# Patient Record
Sex: FEMALE | Race: BLACK OR AFRICAN AMERICAN | Employment: OTHER | URBAN - METROPOLITAN AREA
[De-identification: names, ages, dates, MRNs, and addresses within clinical notes are randomized per-mention and may not be internally consistent; named-entity substitution may affect disease eponyms.]

---

## 2018-08-03 ENCOUNTER — APPOINTMENT (OUTPATIENT)
Dept: GENERAL RADIOLOGY | Age: 78
DRG: 558 | End: 2018-08-03
Attending: PHYSICIAN ASSISTANT
Payer: MEDICARE

## 2018-08-03 ENCOUNTER — HOSPITAL ENCOUNTER (INPATIENT)
Age: 78
LOS: 9 days | Discharge: NURSING, REHAB OR LONG-TERM CARE | DRG: 558 | End: 2018-08-13
Attending: EMERGENCY MEDICINE | Admitting: INTERNAL MEDICINE
Payer: MEDICARE

## 2018-08-03 DIAGNOSIS — R53.1 WEAKNESS: Primary | ICD-10-CM

## 2018-08-03 DIAGNOSIS — L03.116 CELLULITIS OF LEFT LOWER EXTREMITY: ICD-10-CM

## 2018-08-03 DIAGNOSIS — E87.20 LACTIC ACID ACIDOSIS: ICD-10-CM

## 2018-08-03 DIAGNOSIS — M62.82 NON-TRAUMATIC RHABDOMYOLYSIS: ICD-10-CM

## 2018-08-03 PROCEDURE — 71045 X-RAY EXAM CHEST 1 VIEW: CPT

## 2018-08-03 PROCEDURE — 94761 N-INVAS EAR/PLS OXIMETRY MLT: CPT

## 2018-08-03 PROCEDURE — 99285 EMERGENCY DEPT VISIT HI MDM: CPT

## 2018-08-03 RX ORDER — BISMUTH SUBSALICYLATE 262 MG
1 TABLET,CHEWABLE ORAL DAILY
COMMUNITY

## 2018-08-03 RX ORDER — POLYETHYLENE GLYCOL 3350 17 G/17G
17 POWDER, FOR SOLUTION ORAL DAILY
COMMUNITY

## 2018-08-03 RX ORDER — ASPIRIN 81 MG/1
81 TABLET ORAL DAILY
COMMUNITY

## 2018-08-03 RX ORDER — GLIPIZIDE 10 MG/1
10 TABLET, FILM COATED, EXTENDED RELEASE ORAL DAILY
COMMUNITY

## 2018-08-03 RX ORDER — TELMISARTAN 40 MG/1
40 TABLET ORAL DAILY
Status: ON HOLD | COMMUNITY
End: 2018-08-07

## 2018-08-03 RX ORDER — OMEPRAZOLE 20 MG/1
20 CAPSULE, DELAYED RELEASE ORAL DAILY
COMMUNITY

## 2018-08-03 RX ORDER — HYDROCHLOROTHIAZIDE 25 MG/1
25 TABLET ORAL DAILY
COMMUNITY
End: 2018-08-13

## 2018-08-03 RX ORDER — TROSPIUM CHLORIDE 20 MG/1
60 TABLET, FILM COATED ORAL DAILY
Status: ON HOLD | COMMUNITY
End: 2018-08-07

## 2018-08-03 RX ORDER — GABAPENTIN 100 MG/1
100 CAPSULE ORAL 3 TIMES DAILY
COMMUNITY

## 2018-08-03 NOTE — IP AVS SNAPSHOT
[address] [address] 
[phone] Patient: Ina Dodge MRN: [id number] TOE:[id number] A check ephraim indicates which time of day the medication should be taken. My Medications START taking these medications Instructions Each Dose to Equal  
 Morning Noon Evening Bedtime  
 amLODIPine 5 mg tablet Commonly known as:  Nathaniel Ghosh Your last dose was: Your next dose is: Take 1 Tab by mouth daily. 5 mg  
    
   
   
   
  
 linezolid 600 mg tablet Commonly known as:  Erum Cardenas Your last dose was: Your next dose is: Take 1 Tab by mouth two (2) times a day for 4 days. 600 mg CONTINUE taking these medications Instructions Each Dose to Equal  
 Morning Noon Evening Bedtime  
 aspirin delayed-release 81 mg tablet Your last dose was: Your next dose is: Take 81 mg by mouth daily. 81 mg  
    
   
   
   
  
 gabapentin 100 mg capsule Commonly known as:  NEURONTIN Your last dose was: Your next dose is: Take 100 mg by mouth three (3) times daily. 100 mg GLUCOTROL XL 10 mg CR tablet Generic drug:  glipiZIDE SR Your last dose was: Your next dose is: Take 10 mg by mouth daily. 10 mg  
    
   
   
   
  
 JANUVIA 100 mg tablet Generic drug:  SITagliptin Your last dose was: Your next dose is: Take 100 mg by mouth daily. 100 mg  
    
   
   
   
  
 multivitamin tablet Commonly known as:  ONE A DAY Your last dose was: Your next dose is: Take 1 Tab by mouth daily. 1 Tab  
    
   
   
   
  
 omeprazole 20 mg capsule Commonly known as:  PRILOSEC Your last dose was: Your next dose is: Take 20 mg by mouth daily.   
 20 mg  
    
   
   
   
  
 polyethylene glycol 17 gram/dose powder Commonly known as:  Armando Gramajo Your last dose was: Your next dose is: Take 17 g by mouth daily. 17 g SUPER OMEGA-3 400-5 mg-unit Cap Generic drug:  Fish Oil-Vit E-Fat [id number]- Your last dose was: Your next dose is: Take 1 Tab by mouth daily. 1 Tab  
    
   
   
   
  
 trospium 60 mg capsule Commonly known as:  SANCTURA XL Your last dose was: Your next dose is: Take 60 mg by mouth Daily (before breakfast). 60 mg  
    
   
   
   
  
 ZOCOR 20 mg tablet Generic drug:  simvastatin Your last dose was: Your next dose is: Take 20 mg by mouth nightly. 20 mg  
    
   
   
   
  
  
STOP taking these medications   
 hydroCHLOROthiazide 25 mg tablet Commonly known as:  HYDRODIURIL HYDROcodone-acetaminophen 5-300 mg tablet Commonly known as:  XODOL  
   
  
 telmisartan 80 mg tablet Commonly known as:  Anne Moser Where to Get Your Medications These medications were sent to [address] [place] [address] Hours:  24-hours Phone:  [phone]  
  amLODIPine 5 mg tablet Information on where to get these meds will be given to you by the nurse or doctor. ! Ask your nurse or doctor about these medications  
  linezolid 600 mg tablet

## 2018-08-03 NOTE — IP AVS SNAPSHOT
[address] [address] 
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 
[phone] [facility])  SNF is responsible for making arrangements for the PCP visit while in-house. Chosen to continue managing your healthcare needs [phone] [facility] 
[phone] Discharge Orders None A check ephraim indicates which time of day the medication should be taken. My Medications START taking these medications Instructions Each Dose to Equal  
 Morning Noon Evening Bedtime  
 amLODIPine 5 mg tablet Commonly known as:  Aashish Kim Your last dose was: Your next dose is: Take 1 Tab by mouth daily. 5 mg  
    
   
   
   
  
 linezolid 600 mg tablet Commonly known as:  Beba Larsen Your last dose was: Your next dose is: Take 1 Tab by mouth two (2) times a day for 4 days. 600 mg CONTINUE taking these medications Instructions Each Dose to Equal  
 Morning Noon Evening Bedtime  
 aspirin delayed-release 81 mg tablet Your last dose was: Your next dose is: Take 81 mg by mouth daily.   
 81 mg  
    
   
   
   
  
 gabapentin 100 mg capsule Commonly known as:  NEURONTIN Your last dose was: Your next dose is: Take 100 mg by mouth three (3) times daily. 100 mg GLUCOTROL XL 10 mg CR tablet Generic drug:  glipiZIDE SR Your last dose was: Your next dose is: Take 10 mg by mouth daily. 10 mg  
    
   
   
   
  
 JANUVIA 100 mg tablet Generic drug:  SITagliptin Your last dose was: Your next dose is: Take 100 mg by mouth daily. 100 mg  
    
   
   
   
  
 multivitamin tablet Commonly known as:  ONE A DAY Your last dose was: Your next dose is: Take 1 Tab by mouth daily. 1 Tab  
    
   
   
   
  
 omeprazole 20 mg capsule Commonly known as:  PRILOSEC Your last dose was: Your next dose is: Take 20 mg by mouth daily. 20 mg  
    
   
   
   
  
 polyethylene glycol 17 gram/dose powder Commonly known as:  Scot Olga Your last dose was: Your next dose is: Take 17 g by mouth daily. 17 g SUPER OMEGA-3 400-5 mg-unit Cap Generic drug:  Fish Oil-Vit E-Fat [id number]- Your last dose was: Your next dose is: Take 1 Tab by mouth daily. 1 Tab  
    
   
   
   
  
 trospium 60 mg capsule Commonly known as:  SANCTURA XL Your last dose was: Your next dose is: Take 60 mg by mouth Daily (before breakfast). 60 mg  
    
   
   
   
  
 ZOCOR 20 mg tablet Generic drug:  simvastatin Your last dose was: Your next dose is: Take 20 mg by mouth nightly. 20 mg  
    
   
   
   
  
  
STOP taking these medications   
 hydroCHLOROthiazide 25 mg tablet Commonly known as:  HYDRODIURIL HYDROcodone-acetaminophen 5-300 mg tablet Commonly known as:  XODOL  
   
  
 telmisartan 80 mg tablet Commonly known as:  Agustina Shear  
 Where to Get Your Medications These medications were sent to [address]  904 [facility], [address] Naya Goff [address] [facility] Hours:  24-hours Phone:  [phone]  
  amLODIPine 5 mg tablet Information on where to get these meds will be given to you by the nurse or doctor. ! Ask your nurse or doctor about these medications  
  linezolid 600 mg tablet Discharge Instructions None [place] Announcement We are excited to announce that we are making your provider's discharge notes available to you in "[employer]". You will see these notes when they are completed and signed by the physician that discharged you from your recent hospital stay. If you have any questions or concerns about any information you see in "[employer]", please call the Health Information Department where you were seen or reach out to your Primary Care Provider for more information about your plan of care. Introducing [facility]! [facility] introduces "[employer]" patient portal. Now you can access parts of your medical record, email your doctor's office, and request medication refills online. 1. In your internet browser, go to [email or web address] 2. Click on the First Time User? Click Here link in the Sign In box. You will see the New Member Sign Up page. 3. Enter your "[employer]" Access Code exactly as it appears below. You will not need to use this code after youve completed the sign-up process. If you do not sign up before the expiration date, you must request a new code. · "[employer]" Access Code: [email or web address] Expires: 11/1/2018 11:31 PM 
 
4. Enter the last four digits of your Social Security Number (xxxx) and Date of Birth (mm/dd/yyyy) as indicated and click Submit. You will be taken to the next sign-up page. 5. Create a "[employer]" ID.  This will be your "[employer]" login ID and cannot be changed, so think of one that is secure and easy to remember. 6. Create a [employer] password. You can change your password at any time. 7. Enter your Password Reset Question and Answer. This can be used at a later time if you forget your password. 8. Enter your e-mail address. You will receive e-mail notification when new information is available in [address] 9. Click Sign Up. You can now view and download portions of your medical record. 10. Click the Download Summary menu link to download a portable copy of your medical information. If you have questions, please visit the Frequently Asked Questions section of the [employer] website. Remember, [employer] is NOT to be used for urgent needs. For medical emergencies, dial 911. Now available from your iPhone and Android! Introducing Josiah Nixon As a [employer] patient, I wanted to make you aware of our electronic visit tool called Josiah Nixon. [employer] 24/7 allows you to connect within minutes with a medical provider 24 hours a day, seven days a week via a mobile device or tablet or logging into a secure website from your computer. You can access Josiah Nixon from anywhere in the [place]. A virtual visit might be right for you when you have a simple condition and feel like you just dont want to get out of bed, or cant get away from work for an appointment, when your regular [employer] provider is not available (evenings, weekends or holidays), or when youre out of town and need minor care. Electronic visits cost only $49 and if the [employer] 24/7 provider determines a prescription is needed to treat your condition, one can be electronically transmitted to a nearby pharmacy*. Please take a moment to enroll today if you have not already done so. The enrollment process is free and takes just a few minutes.   To enroll, please download the [employer] 24/7 steven to your tablet or phone, or visit [email or web address] to enroll on your computer. And, as an [address] [place] [address] patient with a [employer] account, the results of your visits will be scanned into your electronic medical record and your primary care provider will be able to view the scanned results. We urge you to continue to see your regular [facility] provider for your ongoing medical care. And while your primary care provider may not be the one available when you seek a [employer] virtual visit, the peace of mind you get from getting a real diagnosis real time can be priceless. For more information on [employer], view our Frequently Asked Questions (FAQs) at [email or web address]. Sincerely, 
 
Masoud Solano MD 
Chief Medical Officer [address] *:  certain medications cannot be prescribed via [employer] Unresulted Labs-Please follow up with your PCP about these lab tests Order Current Status CULTURE, BLOOD Preliminary result EKG, 12 LEAD, SUBSEQUENT Preliminary result Providers Seen During Your Hospitalization Provider Specialty Primary office phone Carmela Jean MD Emergency Medicine [phone] Sima Valentin MD Internal Medicine [phone] Your Primary Care Physician (PCP) Primary Care Physician Office Phone Office Fax Rowdy Euceda [phone] You are allergic to the following Allergen Reactions Codeine Rash Itching Percocet (Oxycodone-Acetaminophen) Nausea and Vomiting Tramadol Rash Itching Recent Documentation Height Weight BMI Smoking Status 1.702 m 106.9 kg 36.9 kg/m2 Never Assessed Emergency Contacts Name Discharge Info Relation Home Work Mobile [facility] OUTPATIENT CLINIC DISCHARGE CAREGIVER [3] Daughter [21]   [phone] Rajni Austin DISCHARGE CAREGIVER [3] Daughter [21]   [phone] Patient Belongings The following personal items are in your possession at time of discharge: 
  Dental Appliances: Uppers, Lowers, Partials  Visual Aid: None      Home Medications: None   Jewelry: Watch, Other (comment)  Clothing: At bedside    Other Valuables: Cell Phone Discharge Instructions Attachments/References RHABDOMYOLYSIS (ENGLISH) WOUND: VAC (VACUUM-ASSISTED CLOSURE) (ENGLISH) Patient Handouts Rhabdomyolysis: Care Instructions Your Care Instructions When you have rhabdomyolysis (say \"wre-tyo-uy-AH-dago-spencers\"), dying muscle cells cause toxins to build up in the blood. If not treated, it can cause life-threatening damage to the body's organs. It can be caused by many things, such as severe muscle injury, some medicines (like statins), the flu, and certain blood infections. Symptoms may include weak muscles, pain, stiffness, fever, and nausea. Your urine may also be dark. You will get treatment in the hospital. If possible, the doctor will stop the cause of muscle cell death. The doctor will take steps to protect your organs. You may have to stop taking certain medicines if they are the cause of the problem. You will also get treatment to help the kidneys remove the toxins from your blood. This includes plenty of fluids. You may get fluids through a vein (by IV). You may also need dialysis. Follow-up care is a key part of your treatment and safety. Be sure to make and go to all appointments, and call your doctor if you are having problems. It's also a good idea to know your test results and keep a list of the medicines you take. How can you care for yourself at home? · Take pain medicines exactly as directed. ¨ If the doctor gave you a prescription medicine for pain, take it as prescribed. ¨ If you are not taking a prescription pain medicine, ask your doctor if you can take an over-the-counter medicine. · Talk to your doctor about whether you need to stop taking any medicines. Follow your doctor's instructions about stopping medicines. · Drink plenty of fluids, enough so that your urine is light yellow or clear like water. If you have kidney, heart, or liver disease and have to limit fluids, talk with your doctor before you increase the amount of fluids you drink. When should you call for help? Call your doctor now or seek immediate medical care if: 
  · You have new or worse muscle pain.  
  · You have less urine than normal or no urine.  
  · You have new swelling in your arms or feet.  
  · You have blood in your urine.  
 Watch closely for changes in your health, and be sure to contact your doctor if you do not get better as expected. Where can you learn more? Go to http://mali-tatyana.info/. Enter F129 in the search box to learn more about \"Rhabdomyolysis: Care Instructions. \" Current as of: May 12, 2017 Content Version: 11.7 © 7480-3782 [employer]. Care instructions adapted under license by [employer] (which disclaims liability or warranty for this information). If you have questions about a medical condition or this instruction, always ask your healthcare professional. [address] [address] any warranty or liability for your use of this information. Vacuum-Assisted Closure for Wound Healing: Care Instructions Your Care Instructions When you have a wound that is hard to close your doctor may treat it with vacuum-assisted closure (VAC). VAC uses negative pressure (suction) to help bring the edges of your wound together. It also removes fluid and dead tissue from the wound area. In VAC: 
· A special piece of foam or cotton gauze fits over your wound. This covers and protects the wound. A clear bandage (film dressing) goes several inches beyond the foam or gauze dressing to create a seal for the vacuum. · A tube connects the foam to a small machine called the therapy unit.  The therapy unit creates the suction. · The [facility] system may be carried around (portable) or may stay in one place (stationary). VAC does not hurt. You may feel a mild pulling on the wound when treatment first starts. How long you need VAC depends on the size and type of wound you have and how well the [facility] works. You will be limited in what you can do while the wound heals. You will use VAC 24 hours a day. Follow-up care is a key part of your treatment and safety. Be sure to make and go to all appointments, and call your doctor if you are having problems. It's also a good idea to know your test results and keep a list of the medicines you take. How can you care for yourself at home? · A home health care worker will come to your home a few times a week to change the bandage and check the machine. You may need it changed more often if there is a lot of drainage. · Your doctor will give you information on what you can and can't do. This depends on where your wound is located. Your activities may be limited during the time you're using VAC. · You will be able to take sponge baths. Don't shower or take baths unless your doctor says it is okay. · Take pain medicines exactly as directed. ¨ If the doctor gave you a prescription medicine for pain, take it as prescribed. ¨ If you are not taking a prescription pain medicine, ask your doctor if you can take an over-the-counter medicine. · If your doctor prescribed antibiotics, take them as directed. Do not stop taking them just because you feel better. You need to take the full course of antibiotics. When should you call for help? Call 911 anytime you think you may need emergency care. For example, call if: 
  · You have a lot of bleeding or see a sudden change in the color or texture of the drainage.  
  · The wound splits open and organs under the skin can be seen (evisceration).  
 Call your doctor now or seek immediate medical care if:   · The wound starts bleeding.  
  · The bandage comes off. Cover the area with a sterile bandage until you can see your doctor or your home health care worker comes by.  
  · You have signs of infection, such as: 
¨ Increased pain, swelling, warmth, or redness around the wound. ¨ Red streaks leading from the wound. ¨ Pus draining from the wound. ¨ A fever.  
 Watch closely for changes in your health, and be sure to contact your doctor if: 
  · The noise the machine makes changes or gets very loud. This may mean the seal is broken or the machine is not producing enough suction. Where can you learn more? Go to http://mali-tatyana.info/. Enter D706 in the search box to learn more about \"Vacuum-Assisted Closure for Wound Healing: Care Instructions. \" Current as of: November 21, 2017 Content Version: 11.7 © 8835-6903 [employer], [employer]. Care instructions adapted under license by [employer] (which disclaims liability or warranty for this information). If you have questions about a medical condition or this instruction, always ask your healthcare professional. [address] any warranty or liability for your use of this information. Please provide this summary of care documentation to your next provider. Signatures-by signing, you are acknowledging that this After Visit Summary has been reviewed with you and you have received a copy. Patient Signature:  ____________________________________________________________ Date:  ____________________________________________________________  
  
Xeniasph Perfect Provider Signature:  ____________________________________________________________ Date:  ____________________________________________________________

## 2018-08-04 ENCOUNTER — APPOINTMENT (OUTPATIENT)
Dept: VASCULAR SURGERY | Age: 78
DRG: 558 | End: 2018-08-04
Attending: PHYSICIAN ASSISTANT
Payer: MEDICARE

## 2018-08-04 PROBLEM — E87.20 LACTIC ACIDOSIS: Status: ACTIVE | Noted: 2018-08-04

## 2018-08-04 PROBLEM — M62.82 RHABDOMYOLYSIS: Status: ACTIVE | Noted: 2018-08-04

## 2018-08-04 PROBLEM — L97.909 LEG ULCER (HCC): Status: ACTIVE | Noted: 2018-08-04

## 2018-08-04 LAB
ALBUMIN SERPL-MCNC: 2.8 G/DL (ref 3.4–5)
ALBUMIN/GLOB SERPL: 0.5 {RATIO} (ref 0.8–1.7)
ALP SERPL-CCNC: 99 U/L (ref 45–117)
ALT SERPL-CCNC: 38 U/L (ref 13–56)
ANION GAP SERPL CALC-SCNC: 6 MMOL/L (ref 3–18)
AST SERPL-CCNC: 78 U/L (ref 15–37)
ATRIAL RATE: 111 BPM
BASOPHILS # BLD: 0 K/UL (ref 0–0.1)
BASOPHILS NFR BLD: 0 % (ref 0–2)
BILIRUB SERPL-MCNC: 0.4 MG/DL (ref 0.2–1)
BUN SERPL-MCNC: 19 MG/DL (ref 7–18)
BUN/CREAT SERPL: 21 (ref 12–20)
CALCIUM SERPL-MCNC: 9.7 MG/DL (ref 8.5–10.1)
CALCULATED P AXIS, ECG09: 48 DEGREES
CALCULATED R AXIS, ECG10: -36 DEGREES
CALCULATED T AXIS, ECG11: 50 DEGREES
CHLORIDE SERPL-SCNC: 102 MMOL/L (ref 100–108)
CK MB CFR SERPL CALC: 0.3 % (ref 0–4)
CK MB SERPL-MCNC: 7.4 NG/ML (ref 5–25)
CK SERPL-CCNC: 1963 U/L (ref 26–192)
CK SERPL-CCNC: 2497 U/L (ref 26–192)
CO2 SERPL-SCNC: 28 MMOL/L (ref 21–32)
CREAT SERPL-MCNC: 0.92 MG/DL (ref 0.6–1.3)
DIAGNOSIS, 93000: NORMAL
DIFFERENTIAL METHOD BLD: ABNORMAL
EOSINOPHIL # BLD: 0 K/UL (ref 0–0.4)
EOSINOPHIL NFR BLD: 0 % (ref 0–5)
ERYTHROCYTE [DISTWIDTH] IN BLOOD BY AUTOMATED COUNT: 16.2 % (ref 11.6–14.5)
EST. AVERAGE GLUCOSE BLD GHB EST-MCNC: 140 MG/DL
GLOBULIN SER CALC-MCNC: 6.1 G/DL (ref 2–4)
GLUCOSE BLD STRIP.AUTO-MCNC: 153 MG/DL (ref 70–110)
GLUCOSE BLD STRIP.AUTO-MCNC: 169 MG/DL (ref 70–110)
GLUCOSE BLD STRIP.AUTO-MCNC: 172 MG/DL (ref 70–110)
GLUCOSE BLD STRIP.AUTO-MCNC: 175 MG/DL (ref 70–110)
GLUCOSE SERPL-MCNC: 180 MG/DL (ref 74–99)
HBA1C MFR BLD: 6.5 % (ref 4.5–5.6)
HCT VFR BLD AUTO: 37.1 % (ref 35–45)
HGB BLD-MCNC: 12.1 G/DL (ref 12–16)
LACTATE SERPL-SCNC: 2 MMOL/L (ref 0.4–2)
LACTATE SERPL-SCNC: 2.1 MMOL/L (ref 0.4–2)
LACTATE SERPL-SCNC: 2.3 MMOL/L (ref 0.4–2)
LACTATE SERPL-SCNC: 2.3 MMOL/L (ref 0.4–2)
LACTATE SERPL-SCNC: 2.4 MMOL/L (ref 0.4–2)
LYMPHOCYTES # BLD: 0.9 K/UL (ref 0.9–3.6)
LYMPHOCYTES NFR BLD: 7 % (ref 21–52)
MCH RBC QN AUTO: 27.1 PG (ref 24–34)
MCHC RBC AUTO-ENTMCNC: 32.6 G/DL (ref 31–37)
MCV RBC AUTO: 83.2 FL (ref 74–97)
MONOCYTES # BLD: 0.5 K/UL (ref 0.05–1.2)
MONOCYTES NFR BLD: 4 % (ref 3–10)
NEUTS SEG # BLD: 11.5 K/UL (ref 1.8–8)
NEUTS SEG NFR BLD: 89 % (ref 40–73)
P-R INTERVAL, ECG05: 160 MS
PLATELET # BLD AUTO: 226 K/UL (ref 135–420)
PMV BLD AUTO: 9.7 FL (ref 9.2–11.8)
POTASSIUM SERPL-SCNC: 3.5 MMOL/L (ref 3.5–5.5)
PROT SERPL-MCNC: 8.9 G/DL (ref 6.4–8.2)
Q-T INTERVAL, ECG07: 338 MS
QRS DURATION, ECG06: 76 MS
QTC CALCULATION (BEZET), ECG08: 459 MS
RBC # BLD AUTO: 4.46 M/UL (ref 4.2–5.3)
SODIUM SERPL-SCNC: 136 MMOL/L (ref 136–145)
TROPONIN I SERPL-MCNC: 0.03 NG/ML (ref 0–0.06)
VENTRICULAR RATE, ECG03: 111 BPM
WBC # BLD AUTO: 12.9 K/UL (ref 4.6–13.2)

## 2018-08-04 PROCEDURE — 96367 TX/PROPH/DG ADDL SEQ IV INF: CPT

## 2018-08-04 PROCEDURE — 85025 COMPLETE CBC W/AUTO DIFF WBC: CPT | Performed by: PHYSICIAN ASSISTANT

## 2018-08-04 PROCEDURE — 83605 ASSAY OF LACTIC ACID: CPT | Performed by: INTERNAL MEDICINE

## 2018-08-04 PROCEDURE — 93005 ELECTROCARDIOGRAM TRACING: CPT

## 2018-08-04 PROCEDURE — 96365 THER/PROPH/DIAG IV INF INIT: CPT

## 2018-08-04 PROCEDURE — 96375 TX/PRO/DX INJ NEW DRUG ADDON: CPT

## 2018-08-04 PROCEDURE — A4216 STERILE WATER/SALINE, 10 ML: HCPCS | Performed by: PHYSICIAN ASSISTANT

## 2018-08-04 PROCEDURE — 82550 ASSAY OF CK (CPK): CPT | Performed by: PHYSICIAN ASSISTANT

## 2018-08-04 PROCEDURE — 83605 ASSAY OF LACTIC ACID: CPT | Performed by: EMERGENCY MEDICINE

## 2018-08-04 PROCEDURE — 74011250637 HC RX REV CODE- 250/637: Performed by: PHYSICIAN ASSISTANT

## 2018-08-04 PROCEDURE — 65270000029 HC RM PRIVATE

## 2018-08-04 PROCEDURE — 74011250637 HC RX REV CODE- 250/637: Performed by: INTERNAL MEDICINE

## 2018-08-04 PROCEDURE — 83036 HEMOGLOBIN GLYCOSYLATED A1C: CPT | Performed by: INTERNAL MEDICINE

## 2018-08-04 PROCEDURE — 83605 ASSAY OF LACTIC ACID: CPT | Performed by: PHYSICIAN ASSISTANT

## 2018-08-04 PROCEDURE — 74011250636 HC RX REV CODE- 250/636: Performed by: INTERNAL MEDICINE

## 2018-08-04 PROCEDURE — 93971 EXTREMITY STUDY: CPT

## 2018-08-04 PROCEDURE — 87040 BLOOD CULTURE FOR BACTERIA: CPT | Performed by: PHYSICIAN ASSISTANT

## 2018-08-04 PROCEDURE — 82962 GLUCOSE BLOOD TEST: CPT

## 2018-08-04 PROCEDURE — 80053 COMPREHEN METABOLIC PANEL: CPT | Performed by: PHYSICIAN ASSISTANT

## 2018-08-04 PROCEDURE — 74011250636 HC RX REV CODE- 250/636: Performed by: EMERGENCY MEDICINE

## 2018-08-04 PROCEDURE — 74011250636 HC RX REV CODE- 250/636: Performed by: PHYSICIAN ASSISTANT

## 2018-08-04 PROCEDURE — 74011636637 HC RX REV CODE- 636/637: Performed by: INTERNAL MEDICINE

## 2018-08-04 PROCEDURE — 36415 COLL VENOUS BLD VENIPUNCTURE: CPT | Performed by: INTERNAL MEDICINE

## 2018-08-04 RX ORDER — TROSPIUM CHLORIDE 20 MG/1
60 TABLET, FILM COATED ORAL DAILY
Status: DISCONTINUED | OUTPATIENT
Start: 2018-08-04 | End: 2018-08-13 | Stop reason: HOSPADM

## 2018-08-04 RX ORDER — CIPROFLOXACIN 500 MG/1
500 TABLET ORAL
Status: DISCONTINUED | OUTPATIENT
Start: 2018-08-04 | End: 2018-08-04

## 2018-08-04 RX ORDER — SODIUM CHLORIDE 9 MG/ML
150 INJECTION, SOLUTION INTRAVENOUS CONTINUOUS
Status: DISCONTINUED | OUTPATIENT
Start: 2018-08-04 | End: 2018-08-07

## 2018-08-04 RX ORDER — ACETAMINOPHEN 650 MG/1
650 SUPPOSITORY RECTAL
Status: DISCONTINUED | OUTPATIENT
Start: 2018-08-04 | End: 2018-08-13 | Stop reason: HOSPADM

## 2018-08-04 RX ORDER — INSULIN LISPRO 100 [IU]/ML
INJECTION, SOLUTION INTRAVENOUS; SUBCUTANEOUS
Status: DISCONTINUED | OUTPATIENT
Start: 2018-08-04 | End: 2018-08-13 | Stop reason: HOSPADM

## 2018-08-04 RX ORDER — HYDROCODONE BITARTRATE AND ACETAMINOPHEN 10; 325 MG/1; MG/1
1 TABLET ORAL
Status: DISCONTINUED | OUTPATIENT
Start: 2018-08-04 | End: 2018-08-05

## 2018-08-04 RX ORDER — VANCOMYCIN/0.9 % SOD CHLORIDE 1.5G/250ML
1500 PLASTIC BAG, INJECTION (ML) INTRAVENOUS EVERY 24 HOURS
Status: DISCONTINUED | OUTPATIENT
Start: 2018-08-05 | End: 2018-08-08 | Stop reason: DRUGHIGH

## 2018-08-04 RX ORDER — TELMISARTAN 40 MG/1
40 TABLET ORAL DAILY
Status: DISCONTINUED | OUTPATIENT
Start: 2018-08-04 | End: 2018-08-10

## 2018-08-04 RX ORDER — HYDROCODONE BITARTRATE AND ACETAMINOPHEN 5; 325 MG/1; MG/1
1 TABLET ORAL
Status: COMPLETED | OUTPATIENT
Start: 2018-08-04 | End: 2018-08-04

## 2018-08-04 RX ORDER — POLYETHYLENE GLYCOL 3350 17 G/17G
17 POWDER, FOR SOLUTION ORAL DAILY
Status: DISCONTINUED | OUTPATIENT
Start: 2018-08-04 | End: 2018-08-13 | Stop reason: HOSPADM

## 2018-08-04 RX ORDER — DEXTROSE 50 % IN WATER (D50W) INTRAVENOUS SYRINGE
25-50 AS NEEDED
Status: DISCONTINUED | OUTPATIENT
Start: 2018-08-04 | End: 2018-08-13 | Stop reason: HOSPADM

## 2018-08-04 RX ORDER — CIPROFLOXACIN 2 MG/ML
400 INJECTION, SOLUTION INTRAVENOUS
Status: COMPLETED | OUTPATIENT
Start: 2018-08-04 | End: 2018-08-04

## 2018-08-04 RX ORDER — MAGNESIUM SULFATE 100 %
4 CRYSTALS MISCELLANEOUS AS NEEDED
Status: DISCONTINUED | OUTPATIENT
Start: 2018-08-04 | End: 2018-08-13 | Stop reason: HOSPADM

## 2018-08-04 RX ORDER — HEPARIN SODIUM 5000 [USP'U]/ML
5000 INJECTION, SOLUTION INTRAVENOUS; SUBCUTANEOUS EVERY 8 HOURS
Status: DISCONTINUED | OUTPATIENT
Start: 2018-08-04 | End: 2018-08-13 | Stop reason: HOSPADM

## 2018-08-04 RX ORDER — CEPHALEXIN 250 MG/1
500 CAPSULE ORAL
Status: DISCONTINUED | OUTPATIENT
Start: 2018-08-04 | End: 2018-08-04

## 2018-08-04 RX ORDER — GABAPENTIN 100 MG/1
100 CAPSULE ORAL 3 TIMES DAILY
Status: DISCONTINUED | OUTPATIENT
Start: 2018-08-04 | End: 2018-08-13 | Stop reason: HOSPADM

## 2018-08-04 RX ORDER — SODIUM CHLORIDE 9 MG/ML
75 INJECTION, SOLUTION INTRAVENOUS CONTINUOUS
Status: DISCONTINUED | OUTPATIENT
Start: 2018-08-04 | End: 2018-08-08

## 2018-08-04 RX ORDER — ONDANSETRON 4 MG/1
4 TABLET, ORALLY DISINTEGRATING ORAL
Status: COMPLETED | OUTPATIENT
Start: 2018-08-04 | End: 2018-08-04

## 2018-08-04 RX ORDER — OMEPRAZOLE 20 MG/1
20 CAPSULE, DELAYED RELEASE ORAL DAILY
Status: DISCONTINUED | OUTPATIENT
Start: 2018-08-04 | End: 2018-08-13 | Stop reason: HOSPADM

## 2018-08-04 RX ADMIN — HYDROCODONE BITARTRATE AND ACETAMINOPHEN 1 TABLET: 10; 325 TABLET ORAL at 20:24

## 2018-08-04 RX ADMIN — GABAPENTIN 100 MG: 100 CAPSULE ORAL at 22:35

## 2018-08-04 RX ADMIN — INSULIN LISPRO 2 UNITS: 100 INJECTION, SOLUTION INTRAVENOUS; SUBCUTANEOUS at 18:19

## 2018-08-04 RX ADMIN — HYDROCODONE BITARTRATE AND ACETAMINOPHEN 1 TABLET: 5; 325 TABLET ORAL at 01:24

## 2018-08-04 RX ADMIN — HEPARIN SODIUM 5000 UNITS: 5000 INJECTION INTRAVENOUS; SUBCUTANEOUS at 13:44

## 2018-08-04 RX ADMIN — Medication 1 CAPSULE: at 10:22

## 2018-08-04 RX ADMIN — SODIUM CHLORIDE 125 ML/HR: 900 INJECTION, SOLUTION INTRAVENOUS at 05:58

## 2018-08-04 RX ADMIN — CIPROFLOXACIN 400 MG: 2 INJECTION, SOLUTION INTRAVENOUS at 03:17

## 2018-08-04 RX ADMIN — GABAPENTIN 100 MG: 100 CAPSULE ORAL at 10:21

## 2018-08-04 RX ADMIN — INSULIN LISPRO 2 UNITS: 100 INJECTION, SOLUTION INTRAVENOUS; SUBCUTANEOUS at 13:43

## 2018-08-04 RX ADMIN — TELMISARTAN 40 MG: 40 TABLET ORAL at 10:39

## 2018-08-04 RX ADMIN — HYDROCODONE BITARTRATE AND ACETAMINOPHEN 1 TABLET: 10; 325 TABLET ORAL at 06:02

## 2018-08-04 RX ADMIN — INSULIN LISPRO 2 UNITS: 100 INJECTION, SOLUTION INTRAVENOUS; SUBCUTANEOUS at 22:35

## 2018-08-04 RX ADMIN — GABAPENTIN 100 MG: 100 CAPSULE ORAL at 18:18

## 2018-08-04 RX ADMIN — WATER 1 G: 1 INJECTION INTRAMUSCULAR; INTRAVENOUS; SUBCUTANEOUS at 01:31

## 2018-08-04 RX ADMIN — SODIUM CHLORIDE 1000 ML: 900 INJECTION, SOLUTION INTRAVENOUS at 04:43

## 2018-08-04 RX ADMIN — HYDROCODONE BITARTRATE AND ACETAMINOPHEN 1 TABLET: 10; 325 TABLET ORAL at 13:44

## 2018-08-04 RX ADMIN — MULTIPLE VITAMINS W/ MINERALS TAB 1 TABLET: TAB at 10:21

## 2018-08-04 RX ADMIN — HEPARIN SODIUM 5000 UNITS: 5000 INJECTION INTRAVENOUS; SUBCUTANEOUS at 04:52

## 2018-08-04 RX ADMIN — SODIUM CHLORIDE 1000 MG: 900 INJECTION, SOLUTION INTRAVENOUS at 01:36

## 2018-08-04 RX ADMIN — ONDANSETRON 4 MG: 4 TABLET, ORALLY DISINTEGRATING ORAL at 01:24

## 2018-08-04 RX ADMIN — INSULIN LISPRO 2 UNITS: 100 INJECTION, SOLUTION INTRAVENOUS; SUBCUTANEOUS at 08:23

## 2018-08-04 RX ADMIN — VANCOMYCIN HYDROCHLORIDE 1500 MG: 10 INJECTION, POWDER, LYOPHILIZED, FOR SOLUTION INTRAVENOUS at 23:56

## 2018-08-04 RX ADMIN — OMEPRAZOLE 20 MG: 20 CAPSULE, DELAYED RELEASE ORAL at 10:21

## 2018-08-04 RX ADMIN — SODIUM CHLORIDE 1000 ML: 900 INJECTION, SOLUTION INTRAVENOUS at 01:23

## 2018-08-04 RX ADMIN — SODIUM CHLORIDE 125 ML/HR: 900 INJECTION, SOLUTION INTRAVENOUS at 20:24

## 2018-08-04 RX ADMIN — HEPARIN SODIUM 5000 UNITS: 5000 INJECTION INTRAVENOUS; SUBCUTANEOUS at 20:24

## 2018-08-04 NOTE — ED NOTES
Verbal report given to [facility]) on Taylor Mason being transferred to [address] [place] [address] for IV abxs, and IVF. Report consisted of patient's Situation, Background, Assessment and Recommendations (SBAR) Information from the following report(s)  ED summary, labs, meds, fluids was reviewed with the receiving nurse. Opportunity for questions and clarification was provided. Patient transported with:  NS bolus IV, RA via stretcher. Last Filed Values: 
Temp: 99.7 °F (37.6 °C) (08/04/18 0446) Pulse (Heart Rate): (!) 108 (08/04/18 0446) Resp Rate: 20 (08/04/18 0446) O2 Sat (%): 100 % (08/04/18 0446) BP: 124/66 (08/04/18 0446) MAP (Monitor): 84 (08/04/18 0255) MAP (Calculated): 85 (08/04/18 0446) Level of Consciousness: Alert (08/04/18 0446) Lab Results Component Value Date/Time WBC 12.9 08/04/2018 01:25 AM  
 Lactic acid 2.1 (HH) 08/04/2018 03:00 AM  
 Lactic acid 2.3 (HH) 08/04/2018 12:08 AM  
 
 
Repeat LA:  Time Due at 0530 by phlebotomist. 
 
Blood Cultures Drawn:  yes Fluid Resuscitation:  Total needed  2nd NS bolus infusing. All Antibiotics Started:  yes Dose Due see MAR. Pt received Cipro, Vanc, and Rocephin IV in ED 
 
VS x 2 post-fluid resuscitation:   Start NS at 125 ml/hr once fluid bolus complete. Vasopressor Infusion:  no 
 
Provider Reassessment needed and notified:  DR Gabi Anaya evaluated pt Additional Interventions/Comments:  Pt being transported to the unit via stretcher by EDT Fe Brennan. NAD observed. Pt admitted to room 315. Have notified lab to inform the phlebotomist that pt has a repeat Lactic at 0530 and pt is being transported to the unit to room 315. Understanding acknowledged by the lab staff.

## 2018-08-04 NOTE — ED TRIAGE NOTES
Pt reports that she fell last night and was unable to get off of floor. Remembered that she had life alert button tonight and pushed it for assistance. Pt reports that she missed her wound care appt today. Pt has wound vac to left lower leg-no power to wound vac. Wound on left leg is leaking green foul smelling discharge. Pt has wounds to left lower leg as well. Pt reports redness that is currently present on left lower leg extending up into lower thigh is new. States just generalized weakness. Pt covered in feces. Has not eaten or had any of her medications since fall.

## 2018-08-04 NOTE — ROUTINE PROCESS
Bedside and Verbal shift change report given to Finn Proctor RN (oncoming nurse) by Roger Ceron RN (offgoing nurse). Report included the following information SBAR, Kardex, ED Summary, Procedure Summary, Intake/Output, MAR, Recent Results and Med Rec Status.

## 2018-08-04 NOTE — PROGRESS NOTES
Patient admitted this AM by Dr Lisa Anderson for [facility] and cellulitis. Her fever has improved and all other vital sign are stable presently. Labs and rads reviewed and are only significant for persistent lactate elevation. Continue current therapy to include Rocephin, Vanc, NS at 125 and serial lactate measure until her lactate is < 2.

## 2018-08-04 NOTE — PROGRESS NOTES
Pharmacy Dosing Services: Vancomycin Consult for Vancomycin Dosing by Pharmacy by Dr. Chuy Howard Consult provided for this 70y.o. year old female , for indication of Skin and Soft Tissue Infection (7 days). Day of Therapy 1 Ht Readings from Last 1 Encounters:  
08/03/18 170.2 cm (67\") Wt Readings from Last 1 Encounters:  
08/03/18 95.3 kg (210 lb) Other Current Antibiotics Ceftriaxone 1 g IV every 24 hours Significant Cultures NA Serum Creatinine Lab Results Component Value Date/Time Creatinine 0.92 08/04/2018 01:25 AM  
  
Creatinine Clearance Estimated Creatinine Clearance: 61.7 mL/min (based on Cr of 0.92). BUN Lab Results Component Value Date/Time BUN 19 (H) 08/04/2018 01:25 AM  
  
WBC Lab Results Component Value Date/Time WBC 12.9 08/04/2018 01:25 AM  
  
H/H Lab Results Component Value Date/Time HGB 12.1 08/04/2018 01:25 AM  
  
Platelets Lab Results Component Value Date/Time PLATELET 041 31/86/3558 01:25 AM  
  
Temp 99.7 °F (37.6 °C) Patient has received 1 dose of Vancomycin 1000 mg at 0130 8/4/18. Follow with maintenance dose of 1500 mg at 0000 8/5/18, every 24 hours. Dose calculated to approximate a therapeutic trough of 10- 15 mcg/mL. Pharmacy to follow daily and will make changes to dose and/or frequency based on clinical status. Pharmacist Dee Dee Richards 97

## 2018-08-04 NOTE — ED PROVIDER NOTES
EMERGENCY DEPARTMENT HISTORY AND PHYSICAL EXAM 
 
Date: 8/3/2018 Patient Name: Saima Freeman History of Presenting Illness Chief Complaint Patient presents with  Fall History Provided By: Patient Chief Complaint: Jeff Marito Duration: 1 Day Timing:  Acute Modifying Factors: No modifying factors Associated Symptoms: LE pain, generalized weakness, and redness Additional History (Context):  
11:28 PM  
Saima Freeman is a 70 y.o. female with PMHx of cellulitis, venous insufficiency, HTN, Chronic ulcer of left LE, HLD, GERD, and DM presents to the emergency department C/O fall onset yesterday. Associated sxs include LE pain, generalized weakness, and redness. Pt states that she had a mechanical fall yesterday where he body felt heavy and she fell to the ground. Patient was laying on the floor since last night and remember today that she had a Life Alert on her wrist and pressed it. Pt was found on the floor covered in her own feces. Pt is followed by [facility] for wound care. Patient has a wound vac to her left LE, and green malodorous discharge from the wound, patient missed her appointment today. There is new redness to her wound that extends up her legs which is new. Pt has not been taken any of her prescribed medication since her fall. Pt has recently been on Cipro. Pt has taken Norco in the past without problems. Pt denies abd pain, CP, SOB, back pain, fever, or chills, and any other sxs or complaints. PCP: Macky Gaucher, MD 
 
 
 
Past History Past Medical History: 
Past Medical History:  
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 
History reviewed. No pertinent surgical history. Family History: 
History reviewed.  No pertinent family history. Social History: 
Social History Substance Use Topics  Smoking status: None  Smokeless tobacco: None  Alcohol use None Allergies: Allergies Allergen Reactions  Codeine Rash and Itching  Percocet [Oxycodone-Acetaminophen] Nausea and Vomiting  Tramadol Rash and Itching Review of Systems Review of Systems Constitutional: Negative for chills and fever. Respiratory: Negative for shortness of breath. Cardiovascular: Negative for chest pain. Gastrointestinal: Negative for abdominal pain. Musculoskeletal: Positive for myalgias. Negative for back pain. Skin: Positive for color change and wound. Neurological: Positive for weakness. All other systems reviewed and are negative. Physical Exam  
 
Vitals:  
 08/04/18 9475 08/04/18 0255 08/04/18 0258 08/04/18 3177 BP: 146/72 130/70  146/64 Pulse: (!) 112 (!) 112 (!) 110 (!) 115 Resp: 23 24 18 24 Temp:    (!) 100.5 °F (38.1 °C) SpO2: 98%   100% Weight:      
Height:      
 
Physical Exam  
Constitutional: She is oriented to person, place, and time. She appears well-developed and well-nourished. Alert, oriented x4, sitting up on stretcher, non toxic HENT:  
Head: Normocephalic and atraumatic. Neck: Normal range of motion. Neck supple. Cardiovascular: Normal rate, regular rhythm, normal heart sounds and intact distal pulses. No murmur heard. Pulmonary/Chest: Effort normal and breath sounds normal. No respiratory distress. She has no wheezes. She has no rales. Abdominal: Soft. Bowel sounds are normal. She exhibits no distension. There is no tenderness. There is no rebound and no guarding. Musculoskeletal:  
     Legs: 
Neurological: She is alert and oriented to person, place, and time. Skin: There is erythema. See musc Psychiatric: She has a normal mood and affect. Judgment normal.  
Nursing note and vitals reviewed. Diagnostic Study Results Labs - 
 Recent Results (from the past 12 hour(s)) CULTURE, BLOOD Collection Time: 08/04/18 12:08 AM  
Result Value Ref Range Special Requests: NO SPECIAL REQUESTS Culture result: NO GROWTH AFTER 1 HOUR    
LACTIC ACID Collection Time: 08/04/18 12:08 AM  
Result Value Ref Range Lactic acid 2.3 (HH) 0.4 - 2.0 MMOL/L  
CBC WITH AUTOMATED DIFF Collection Time: 08/04/18  1:25 AM  
Result Value Ref Range WBC 12.9 4.6 - 13.2 K/uL  
 RBC 4.46 4.20 - 5.30 M/uL  
 HGB 12.1 12.0 - 16.0 g/dL HCT 37.1 35.0 - 45.0 % MCV 83.2 74.0 - 97.0 FL  
 MCH 27.1 24.0 - 34.0 PG  
 MCHC 32.6 31.0 - 37.0 g/dL  
 RDW 16.2 (H) 11.6 - 14.5 % PLATELET 435 999 - 201 K/uL MPV 9.7 9.2 - 11.8 FL  
 NEUTROPHILS 89 (H) 40 - 73 % LYMPHOCYTES 7 (L) 21 - 52 % MONOCYTES 4 3 - 10 % EOSINOPHILS 0 0 - 5 % BASOPHILS 0 0 - 2 %  
 ABS. NEUTROPHILS 11.5 (H) 1.8 - 8.0 K/UL  
 ABS. LYMPHOCYTES 0.9 0.9 - 3.6 K/UL  
 ABS. MONOCYTES 0.5 0.05 - 1.2 K/UL  
 ABS. EOSINOPHILS 0.0 0.0 - 0.4 K/UL  
 ABS. BASOPHILS 0.0 0.0 - 0.1 K/UL  
 DF AUTOMATED METABOLIC PANEL, COMPREHENSIVE Collection Time: 08/04/18  1:25 AM  
Result Value Ref Range Sodium 136 136 - 145 mmol/L Potassium 3.5 3.5 - 5.5 mmol/L Chloride 102 100 - 108 mmol/L  
 CO2 28 21 - 32 mmol/L Anion gap 6 3.0 - 18 mmol/L Glucose 180 (H) 74 - 99 mg/dL BUN 19 (H) 7.0 - 18 MG/DL Creatinine 0.92 0.6 - 1.3 MG/DL  
 BUN/Creatinine ratio 21 (H) 12 - 20 GFR est AA >60 >60 ml/min/1.73m2 GFR est non-AA 59 (L) >60 ml/min/1.73m2 Calcium 9.7 8.5 - 10.1 MG/DL Bilirubin, total 0.4 0.2 - 1.0 MG/DL  
 ALT (SGPT) 38 13 - 56 U/L  
 AST (SGOT) 78 (H) 15 - 37 U/L Alk. phosphatase 99 45 - 117 U/L Protein, total 8.9 (H) 6.4 - 8.2 g/dL Albumin 2.8 (L) 3.4 - 5.0 g/dL Globulin 6.1 (H) 2.0 - 4.0 g/dL A-G Ratio 0.5 (L) 0.8 - 1.7 CARDIAC PANEL,(CK, CKMB & TROPONIN) Collection Time: 08/04/18  1:25 AM  
Result Value Ref Range  CK 2497 (H) 26 - 192 U/L  
 CK - MB 7.4 (H) <3.6 ng/ml CK-MB Index 0.3 0.0 - 4.0 % Troponin-I, Qt. 0.03 0.00 - 0.06 NG/ML  
LACTIC ACID Collection Time: 08/04/18  3:00 AM  
Result Value Ref Range Lactic acid 2.1 (HH) 0.4 - 2.0 MMOL/L Radiologic Studies -  
XR CHEST PORT Final Result CT Results  (Last 48 hours) None CXR Results  (Last 48 hours) 08/03/18 2354  XR CHEST PORT Final result Impression:  Impression:  
-------------- Low lung volumes, portable technique and rotation limits evaluation. No active pulmonary disease identified. Narrative:  ---------------------------------------------------------------------------  
<<<<<<<<<           [facility]           >>>>>>>>>   
--------------------------------------------------------------------------- CLINICAL HISTORY:  Weakness. COMPARISON EXAMINATIONS:  None. ---  SINGLE FRONTAL VIEW OF THE CHEST  --- The patient is rotated and the lung volumes are low. The cardiomediastinal  
silhouette is within normal limits. There is no focal consolidation or pleural  
effusion. No significant osseous abnormalities are identified.    
   
   
-------------- Medical Decision Making I am the first provider for this patient. I reviewed the vital signs, available nursing notes, past medical history, past surgical history, family history and social history. Vital Signs-Reviewed the patient's vital signs. Pulse Oximetry Analysis - 98% on RA Cardiac Monitor: 
Rate: 110 bpm 
Rhythm: NSR 
 
EKG interpretation: (Preliminary) Sinus tachycardia at 111 bpm. LAD. No STEMI. EKG read by Ilda Hawkins. Juliocesar Branch MD at 3216 Records Reviewed: Nursing Notes and Old Medical Records Provider Notes (Medical Decision Making):  
Cellulitis, dehydration, electrolyte imbalance, rhabdomyolysis Procedures: 
Procedures ED Course:  
11:28 PM Initial assessment performed. The patients presenting problems have been discussed, and they are in agreement with the care plan formulated and outlined with them. I have encouraged them to ask questions as they arise throughout their visit. 12:29 AM Discussed patient's history, exam, and available diagnostics results with Vasiliy Ríos. Wilbur Moore MD, ED Attending, who agree with tx plan.  
 
1:01 AM 
Patient's presentation, labs/imaging and plan of care was reviewed with Vasiliy Moore MD as part of sign out. They will review US and lab results as part of the plan discussed with the patient. Vasiliy Moore MD's assistance in completion of this plan is greatly appreciated but it should be noted that I will be the provider of record for this patient. Written by Saintclair Shope, ED Scribe, as dictated by Fuad Marino PA-C.  
 
CONSULT NOTE:  
4:16 AM 
Vasiliy Ríos. Wilbur Moore MD spoke with Lisa Mcdowell MD, Specialty: Hospitalist 
Discussed pt's hx, disposition, and available diagnostic and imaging results. Reviewed care plans. Consultant will admit the pt to MED SURG. Written by ANTHONY Lundyibfunmi, as dictated by Vincent Moore MD. Diagnosis and Disposition Core Measures: 
For Hospitalized Patients: 
 
1. Hospitalization Decision Time: The decision to hospitalize the patient was made by Vasiliy Moore MD at 3:30 AM on 8/3/2018 2. Aspirin: Aspirin was not given because the patient did not present with a stroke at the time of their Emergency Department evaluation 4:16 AM  Patient is being admitted to the hospital by Lisa Mcdowell MD. The results of their tests and reasons for their admission have been discussed with them and/or available family. They convey agreement and understanding for the need to be admitted and for their admission diagnosis. CONDITIONS ON ADMISSION: 
Sepsis is not present at the time of admission. Deep Vein Thrombosis is not present at the time of admission. Thrombosis is not present at the time of admission. Pneumonia is not present at the time of admission. Urinary tract infection is present. MRSA is not present at the time of admission. Wound infection is not present at the time of admission. Pressure Ulcer is not present at the time of admission. CLINICAL IMPRESSION: 
1. Weakness 2. Cellulitis of left lower extremity 3. Non-traumatic rhabdomyolysis 4. Lactic acid acidosis PLAN: 
1. ADMIT TO MED SURG 
_______________________________ Attestations: This note is prepared by Cherie Amezquita, acting as Scribe for Kam Zendejas PA-C. Kam Zendejas PA-C:  The scribe's documentation has been prepared under my direction and personally reviewed by me in its entirety. I confirm that the note above accurately reflects all work, treatment, procedures, and medical decision making performed by me. Attestation: This note is prepared in-part by Josue Calvert, acting as Scribe for Stephanie Sharma. Jackie Acuna MD, after sign out note (bedside transfer of care) above. 
  
Stephanie Sharma. Jackie Acuna MD: The scribe's documentation has been prepared under my direction and personally reviewed by me in its entirety. I confirm that the note above accurately reflects all work, treatment, procedures, and medical decision making performed by me. 
  
_______________________________ I personally saw and examined the patient. I have reviewed and agree with the MLP's findings, including all diagnostic interpretations, and plans as written. I was present during the key portions of separately billed procedures.    
Sisi Todd MD

## 2018-08-04 NOTE — ROUTINE PROCESS
TRANSFER - IN REPORT: 
 
Verbal report received from Milady Dewey, RN (name) on Alix Patten  being received from Newton Ballard RN (unit) for routine progression of care Report consisted of patients Situation, Background, Assessment and  
Recommendations(SBAR). Information from the following report(s) SBAR, Kardex, ED Summary, Procedure Summary, Intake/Output, MAR, Recent Results and Med Rec Status was reviewed with the receiving nurse. Opportunity for questions and clarification was provided. Assessment completed upon patients arrival to unit and care assumed.

## 2018-08-04 NOTE — ED NOTES
Mary CAMERON (Hospitalist) at bedside speaking with pt. Have reviewed with her in r/t maintenance fluid rate- told to adm the 2 nd NS bolus then start the NS at 125 ml/hr. DR Beltre states she plans to d/c the order for the NS at 150 ml/hr. Temp 99.7 orally. Pt has wound vac sponge to wound of LT lower leg in place but no suction for the wound vac. Pt has ulcerations of her RT lower leg with redness. Swelling/edema of julieta lower extremities. Pt voicing that she was getting around a home with a roller walker, having difficulty though. Pt states she isn't wet- no urine specimen. Awaiting bed assignment- have been informed by the ED  [facility] that [facility] Supervisor is aware.

## 2018-08-04 NOTE — ED NOTES
Assuming care of pt from [facility]. Have been informed that pt will be a transfer to [facility] per pt's request.  Have been informed that the repeat Lactic has been collected by phlebotomist and the Vanc IVPB just completed. NS bolus infusing.

## 2018-08-04 NOTE — ED NOTES
Called from [place] in lab who states that nico is grossly hemolyzed. Requested that phlebotomist come to recollect when pt returns from vascular as pt was stuck 4 times to get these specimens. Also request draw on second set of blood cultures.  [place] states that she will advise phlebotomist.

## 2018-08-04 NOTE — H&P
History & Physical 
 
Patient: Ina Dodge MRN: [id number]  CSN: [id number] YOB: 1940  Age: 70 y.o. Sex: female DOA: 8/3/2018 Chief Complaint:  
Chief Complaint Patient presents with  Fall HPI:  
 
Ina Dodge is a 70 y.o.  female who has chronic wound vac and leg ulcer presents to ER after sustaining a fall and got stuck in comode  And fell to floor covered in stool for 1.day Patient attempted to get up but could not and forgot she was wearing life line Patient brought in via EMS covered in stools was unable to walk / felt sore all over Found to have elevaated lactate and CPK Patient is followed by [address] [facility] and initially wanted transfer to [place] but after waiting for some of her test to come back decided she wanted to stay here She has chronic leg wound ulcer for last 2 year with recent debridement about one week ago. Past Medical History:  
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 No pertinent surgical history. History reviewed. No pertinent family history. Social History Social History  Marital status:  Spouse name: N/A  
 Number of children: N/A  
 Years of education: N/A Social History Main Topics  Smoking status: None  Smokeless tobacco: None  Alcohol use None  Drug use: None  Sexual activity: Not Asked Other Topics Concern  None Social History Narrative Prior to Admission medications Medication Sig Start Date End Date Taking? Authorizing Provider  
aspirin delayed-release 81 mg tablet Take 81 mg by mouth daily.    Yes Phys Other, MD  
gabapentin (NEURONTIN) 100 mg capsule Take 100 mg by mouth three (3) times daily. Yes Tramaine Richmond MD  
glipiZIDE SR (GLUCOTROL XL) 10 mg CR tablet Take 10 mg by mouth daily. Maribell Richmond MD  
hydroCHLOROthiazide (HYDRODIURIL) 25 mg tablet Take 25 mg by mouth daily. Yes Phys Other, MD  
telmisartan (MICARDIS) 40 mg tablet Take 40 mg by mouth daily. Yes Tramaine Richmond MD  
multivitamin (ONE A DAY) tablet Take 1 Tab by mouth daily. Yes Tramaine Richmond MD  
omeprazole (PRILOSEC) 20 mg capsule Take 20 mg by mouth daily. Maribell Richmond MD  
polyethylene glycol (MIRALAX) 17 gram/dose powder Take 17 g by mouth daily. Maribell Richmond MD  
SITagliptin (JANUVIA) 100 mg tablet Take 100 mg by mouth daily. Maribell Richmond MD  
Fish Oil-Vit E-Fat SSLN3- (SUPER OMEGA-3) 400-5 mg-unit cap Take  by mouth. Yes Tramaine Richmond MD  
trospium (SANCTURA) 20 mg tablet Take 60 mg by mouth daily. Yes Tramaine Richmond MD  
 
 
Allergies Allergen Reactions  Codeine Rash and Itching  Percocet [Oxycodone-Acetaminophen] Nausea and Vomiting  Tramadol Rash and Itching Review of Systems GENERAL: Patient alert, awake and oriented times 3, able to communicate full sentences and not in distress. HEENT: No change in vision, no earache, tinnitus, sore throat or sinus congestion. NECK: No pain or stiffness. PULMONARY: No shortness of breath, cough or wheeze. Cardiovascular: no pnd or orthopnea, no CP GASTROINTESTINAL: No abdominal pain, nausea, vomiting or diarrhea, melena or bright red blood per rectum. GENITOURINARY: No urinary frequency, urgency, hesitancy or dysuria. MUSCULOSKELETAL: No joint or muscle pain, no back pain, no recent trauma. DERMATOLOGIC: 3 leg ulcers wound vac left leg ENDOCRINE: No polyuria, polydipsia, no heat or cold intolerance. No recent change in weight. HEMATOLOGICAL: No anemia or easy bruising or bleeding. NEUROLOGIC: No headache, seizures, numbness, tingling +weakness. Physical Exam:  
 
Physical Exam: 
Visit Vitals  /66 (BP 1 Location: Left arm, BP Patient Position: At rest)  Pulse (!) 108  Temp 99.7 °F (37.6 °C)  Resp 20  
 Ht 5' 7\" (1.702 m)  Wt 95.3 kg (210 lb)  SpO2 100%  BMI 32.89 kg/m2 O2 Device: Room air Temp (24hrs), Av.8 °F (37.7 °C), Min:99.3 °F (37.4 °C), Max:100.5 °F (38.1 °C) General:  Alert, cooperative, no distress, appears stated age. Head: Normocephalic, without obvious abnormality, atraumatic. Eyes:  Conjunctivae/corneas clear. PERRL, EOMs intact. Nose: Nares normal. No drainage or sinus tenderness. Neck: Supple, symmetrical, trachea midline, no adenopathy, thyroid: no enlargement, no carotid bruit and no JVD. Lungs:   Clear to auscultation bilaterally. Heart:  Regular rate and rhythm, S1, S2 normal. Soft syst murmer Abdomen: Soft, non-tender. Bowel sounds normal.   
Extremities: Extremities normal, atraumatic, no cyanosis or edema. Pulses: +1 weak diminished  and symmetric all extremities. Skin:  left leg with foul smelling ulcer and streaking upper third of lesion s warmth and erythema present  Ulcer not evident as there is a wound vac sponge which appears clean with no feces /+odor 11 by 5? Neurologic: AAOx3, No focal motor or sensory deficit. Labs Reviewed: All lab results for the last 24 hours reviewed. and EKG Procedures/imaging: see electronic medical records for all procedures/Xrays and details which were not copied into this note but were reviewed prior to creation of Plan Recent Results (from the past 24 hour(s)) CULTURE, BLOOD Collection Time: 18 12:08 AM  
Result Value Ref Range Special Requests: NO SPECIAL REQUESTS Culture result: NO GROWTH AFTER 1 HOUR    
LACTIC ACID Collection Time: 18 12:08 AM  
Result Value Ref Range Lactic acid 2.3 (HH) 0.4 - 2.0 MMOL/L  
CBC WITH AUTOMATED DIFF Collection Time: 18  1:25 AM  
Result Value Ref Range  WBC 12.9 4.6 - 13.2 K/uL  
 RBC 4.46 4.20 - 5.30 M/uL  
 HGB 12.1 12.0 - 16.0 g/dL HCT 37.1 35.0 - 45.0 % MCV 83.2 74.0 - 97.0 FL  
 MCH 27.1 24.0 - 34.0 PG  
 MCHC 32.6 31.0 - 37.0 g/dL  
 RDW 16.2 (H) 11.6 - 14.5 % PLATELET 488 938 - 794 K/uL MPV 9.7 9.2 - 11.8 FL  
 NEUTROPHILS 89 (H) 40 - 73 % LYMPHOCYTES 7 (L) 21 - 52 % MONOCYTES 4 3 - 10 % EOSINOPHILS 0 0 - 5 % BASOPHILS 0 0 - 2 %  
 ABS. NEUTROPHILS 11.5 (H) 1.8 - 8.0 K/UL  
 ABS. LYMPHOCYTES 0.9 0.9 - 3.6 K/UL  
 ABS. MONOCYTES 0.5 0.05 - 1.2 K/UL  
 ABS. EOSINOPHILS 0.0 0.0 - 0.4 K/UL  
 ABS. BASOPHILS 0.0 0.0 - 0.1 K/UL  
 DF AUTOMATED METABOLIC PANEL, COMPREHENSIVE Collection Time: 08/04/18  1:25 AM  
Result Value Ref Range Sodium 136 136 - 145 mmol/L Potassium 3.5 3.5 - 5.5 mmol/L Chloride 102 100 - 108 mmol/L  
 CO2 28 21 - 32 mmol/L Anion gap 6 3.0 - 18 mmol/L Glucose 180 (H) 74 - 99 mg/dL BUN 19 (H) 7.0 - 18 MG/DL Creatinine 0.92 0.6 - 1.3 MG/DL  
 BUN/Creatinine ratio 21 (H) 12 - 20 GFR est AA >60 >60 ml/min/1.73m2 GFR est non-AA 59 (L) >60 ml/min/1.73m2 Calcium 9.7 8.5 - 10.1 MG/DL Bilirubin, total 0.4 0.2 - 1.0 MG/DL  
 ALT (SGPT) 38 13 - 56 U/L  
 AST (SGOT) 78 (H) 15 - 37 U/L Alk. phosphatase 99 45 - 117 U/L Protein, total 8.9 (H) 6.4 - 8.2 g/dL Albumin 2.8 (L) 3.4 - 5.0 g/dL Globulin 6.1 (H) 2.0 - 4.0 g/dL A-G Ratio 0.5 (L) 0.8 - 1.7 CARDIAC PANEL,(CK, CKMB & TROPONIN) Collection Time: 08/04/18  1:25 AM  
Result Value Ref Range CK 2497 (H) 26 - 192 U/L  
 CK - MB 7.4 (H) <3.6 ng/ml CK-MB Index 0.3 0.0 - 4.0 % Troponin-I, Qt. 0.03 0.00 - 0.06 NG/ML  
HEMOGLOBIN A1C WITH EAG Collection Time: 08/04/18  1:25 AM  
Result Value Ref Range Hemoglobin A1c 6.5 (H) 4.5 - 5.6 % Est. average glucose 140 mg/dL LACTIC ACID Collection Time: 08/04/18  3:00 AM  
Result Value Ref Range Lactic acid 2.1 (HH) 0.4 - 2.0 MMOL/L Assessment/Plan Active Problems:   * No active hospital problems. * 
 1. Rhabdomyolysis Fluids hold diuretic recheck electrolytes PT consult 2. Cellulitis with leg ulcers 11 by 5 Broad abx Ip consult wound therapy Surgery consult called in to [facility] 3. DM Sliding scale  
hold Januvia and Glipizide 4. Chronic lalit n Renew norco 
5. Lactic acidosis Improving with fluids and Antibiotics DVT/GI Prophylaxis: Hep SQ Discussed with patient at bedside about hospital admission and my plan care, who understood and agree with my plan care.  
 
Salud Elkins MD 
8/4/2018 4:35 AM

## 2018-08-04 NOTE — ED NOTES
Cipro IVPB infusing per orders- see MAR. This RN discussing with pt and family member to get clarification for transfer to [facility]. Pt saying, \"I want to stay here. \"  Family wanting to honor pt's request.  Dr Thony Copeland has been informed. Pt continues to need to provide urine specimen. Late entry:  Was informed via report that documentation for skin was complete. Pt has wound vac that has been off for the last 24 hrs via report.

## 2018-08-05 LAB
ANION GAP SERPL CALC-SCNC: 4 MMOL/L (ref 3–18)
BASOPHILS # BLD: 0 K/UL (ref 0–0.1)
BASOPHILS NFR BLD: 0 % (ref 0–2)
BUN SERPL-MCNC: 13 MG/DL (ref 7–18)
BUN/CREAT SERPL: 18 (ref 12–20)
CALCIUM SERPL-MCNC: 8.7 MG/DL (ref 8.5–10.1)
CHLORIDE SERPL-SCNC: 104 MMOL/L (ref 100–108)
CK SERPL-CCNC: 936 U/L (ref 26–192)
CO2 SERPL-SCNC: 27 MMOL/L (ref 21–32)
CREAT SERPL-MCNC: 0.71 MG/DL (ref 0.6–1.3)
DIFFERENTIAL METHOD BLD: ABNORMAL
EOSINOPHIL # BLD: 0.2 K/UL (ref 0–0.4)
EOSINOPHIL NFR BLD: 2 % (ref 0–5)
ERYTHROCYTE [DISTWIDTH] IN BLOOD BY AUTOMATED COUNT: 16.5 % (ref 11.6–14.5)
GLUCOSE BLD STRIP.AUTO-MCNC: 120 MG/DL (ref 70–110)
GLUCOSE BLD STRIP.AUTO-MCNC: 182 MG/DL (ref 70–110)
GLUCOSE BLD STRIP.AUTO-MCNC: 203 MG/DL (ref 70–110)
GLUCOSE BLD STRIP.AUTO-MCNC: 225 MG/DL (ref 70–110)
GLUCOSE SERPL-MCNC: 152 MG/DL (ref 74–99)
HCT VFR BLD AUTO: 31.9 % (ref 35–45)
HGB BLD-MCNC: 10 G/DL (ref 12–16)
LACTATE SERPL-SCNC: 1.1 MMOL/L (ref 0.4–2)
LYMPHOCYTES # BLD: 1.5 K/UL (ref 0.9–3.6)
LYMPHOCYTES NFR BLD: 16 % (ref 21–52)
MCH RBC QN AUTO: 26.3 PG (ref 24–34)
MCHC RBC AUTO-ENTMCNC: 31.3 G/DL (ref 31–37)
MCV RBC AUTO: 83.9 FL (ref 74–97)
MONOCYTES # BLD: 0.6 K/UL (ref 0.05–1.2)
MONOCYTES NFR BLD: 7 % (ref 3–10)
NEUTS SEG # BLD: 7 K/UL (ref 1.8–8)
NEUTS SEG NFR BLD: 75 % (ref 40–73)
PLATELET # BLD AUTO: 181 K/UL (ref 135–420)
PMV BLD AUTO: 9.5 FL (ref 9.2–11.8)
POTASSIUM SERPL-SCNC: 4.1 MMOL/L (ref 3.5–5.5)
RBC # BLD AUTO: 3.8 M/UL (ref 4.2–5.3)
SODIUM SERPL-SCNC: 135 MMOL/L (ref 136–145)
WBC # BLD AUTO: 9.3 K/UL (ref 4.6–13.2)

## 2018-08-05 PROCEDURE — 77030011256 HC DRSG MEPILEX <16IN NO BORD MOLN -A

## 2018-08-05 PROCEDURE — 97605 NEG PRS WND THER DME<=50SQCM: CPT

## 2018-08-05 PROCEDURE — 80048 BASIC METABOLIC PNL TOTAL CA: CPT | Performed by: INTERNAL MEDICINE

## 2018-08-05 PROCEDURE — A4216 STERILE WATER/SALINE, 10 ML: HCPCS | Performed by: INTERNAL MEDICINE

## 2018-08-05 PROCEDURE — 77030019952 HC CANSTR VAC ASST KCON -B

## 2018-08-05 PROCEDURE — 85025 COMPLETE CBC W/AUTO DIFF WBC: CPT | Performed by: INTERNAL MEDICINE

## 2018-08-05 PROCEDURE — 65270000029 HC RM PRIVATE

## 2018-08-05 PROCEDURE — 82962 GLUCOSE BLOOD TEST: CPT

## 2018-08-05 PROCEDURE — 82550 ASSAY OF CK (CPK): CPT | Performed by: INTERNAL MEDICINE

## 2018-08-05 PROCEDURE — 83605 ASSAY OF LACTIC ACID: CPT | Performed by: INTERNAL MEDICINE

## 2018-08-05 PROCEDURE — 74011250636 HC RX REV CODE- 250/636: Performed by: INTERNAL MEDICINE

## 2018-08-05 PROCEDURE — 36415 COLL VENOUS BLD VENIPUNCTURE: CPT | Performed by: INTERNAL MEDICINE

## 2018-08-05 PROCEDURE — 74011636637 HC RX REV CODE- 636/637: Performed by: INTERNAL MEDICINE

## 2018-08-05 PROCEDURE — 77030019934 HC DRSG VAC ASST KCON -B

## 2018-08-05 PROCEDURE — 74011250637 HC RX REV CODE- 250/637: Performed by: INTERNAL MEDICINE

## 2018-08-05 RX ORDER — HYDROCODONE BITARTRATE AND ACETAMINOPHEN 10; 325 MG/1; MG/1
2 TABLET ORAL
Status: DISCONTINUED | OUTPATIENT
Start: 2018-08-05 | End: 2018-08-08

## 2018-08-05 RX ADMIN — HEPARIN SODIUM 5000 UNITS: 5000 INJECTION INTRAVENOUS; SUBCUTANEOUS at 03:26

## 2018-08-05 RX ADMIN — HYDROCODONE BITARTRATE AND ACETAMINOPHEN 1 TABLET: 10; 325 TABLET ORAL at 14:12

## 2018-08-05 RX ADMIN — GABAPENTIN 100 MG: 100 CAPSULE ORAL at 17:05

## 2018-08-05 RX ADMIN — HYDROCODONE BITARTRATE AND ACETAMINOPHEN 1 TABLET: 10; 325 TABLET ORAL at 21:03

## 2018-08-05 RX ADMIN — WATER 1 G: 1 INJECTION INTRAMUSCULAR; INTRAVENOUS; SUBCUTANEOUS at 03:20

## 2018-08-05 RX ADMIN — HEPARIN SODIUM 5000 UNITS: 5000 INJECTION INTRAVENOUS; SUBCUTANEOUS at 20:03

## 2018-08-05 RX ADMIN — SODIUM CHLORIDE 125 ML/HR: 900 INJECTION, SOLUTION INTRAVENOUS at 17:04

## 2018-08-05 RX ADMIN — HYDROCODONE BITARTRATE AND ACETAMINOPHEN 1 TABLET: 10; 325 TABLET ORAL at 03:20

## 2018-08-05 RX ADMIN — VANCOMYCIN HYDROCHLORIDE 1500 MG: 10 INJECTION, POWDER, LYOPHILIZED, FOR SOLUTION INTRAVENOUS at 23:27

## 2018-08-05 RX ADMIN — SODIUM CHLORIDE 125 ML/HR: 900 INJECTION, SOLUTION INTRAVENOUS at 08:51

## 2018-08-05 RX ADMIN — GABAPENTIN 100 MG: 100 CAPSULE ORAL at 08:54

## 2018-08-05 RX ADMIN — OMEPRAZOLE 20 MG: 20 CAPSULE, DELAYED RELEASE ORAL at 08:54

## 2018-08-05 RX ADMIN — GABAPENTIN 100 MG: 100 CAPSULE ORAL at 21:04

## 2018-08-05 RX ADMIN — HYDROCODONE BITARTRATE AND ACETAMINOPHEN 1 TABLET: 10; 325 TABLET ORAL at 20:03

## 2018-08-05 RX ADMIN — INSULIN LISPRO 6 UNITS: 100 INJECTION, SOLUTION INTRAVENOUS; SUBCUTANEOUS at 21:06

## 2018-08-05 RX ADMIN — INSULIN LISPRO 3 UNITS: 100 INJECTION, SOLUTION INTRAVENOUS; SUBCUTANEOUS at 16:30

## 2018-08-05 RX ADMIN — MULTIPLE VITAMINS W/ MINERALS TAB 1 TABLET: TAB at 08:53

## 2018-08-05 RX ADMIN — INSULIN LISPRO 6 UNITS: 100 INJECTION, SOLUTION INTRAVENOUS; SUBCUTANEOUS at 12:35

## 2018-08-05 RX ADMIN — Medication 1 CAPSULE: at 08:53

## 2018-08-05 RX ADMIN — HYDROCODONE BITARTRATE AND ACETAMINOPHEN 1 TABLET: 10; 325 TABLET ORAL at 08:54

## 2018-08-05 RX ADMIN — HEPARIN SODIUM 5000 UNITS: 5000 INJECTION INTRAVENOUS; SUBCUTANEOUS at 12:35

## 2018-08-05 RX ADMIN — TELMISARTAN 40 MG: 40 TABLET ORAL at 08:53

## 2018-08-05 NOTE — PROGRESS NOTES

## 2018-08-05 NOTE — PROGRESS NOTES
Hospitalist Progress Note    Patient: Frederic Irvin MRN: [id number]  CSN: [id number]    YOB: 1940  Age: 70 y.o. Sex: female    DOA: 8/3/2018 LOS:  LOS: 1 day          Chief Complaint: Rhado and cellulitis          Assessment/Plan     Disposition :  Patient Active Problem List   Diagnosis Code    Rhabdomyolysis M62.82    Lactic acidosis E87.2    Leg ulcer ([address] L97.909     1. Rhabdomyolysis  Fluids; holding diuretic. Continue to monitor electrolytes  CK down to 936 this AM from 2497 on admission. PT consult     2. Cellulitis with leg ulcers; improving  Lactate has normalized  Continue broad spectrum abx with Rocephin and Vanc  Continue wound care   General Surgery consult called in to [facility] on admission; pending evaluation. 3. DM   Good glycemic control; continue sliding scale   holding Januvia and Glipizide      4. Chronic pain  Continue Norco prn    5. Lactic acidosis; resolved with fluids and antibiotics. Discontinue serial measures.      DVT/GI Prophylaxis: Hep SQ    Subjective:    \"I can hardly move but its better than yesterday. \" No events over night. Patient doing well this AM.  No new complaints.         Review of systems:    Constitutional: denies fevers, chills, myalgias  Respiratory: denies SOB, cough  Cardiovascular: denies chest pain, palpitations  Gastrointestinal: denies nausea, vomiting, diarrhea      Vital signs/Intake and Output:  Visit Vitals    /79 (BP 1 Location: Left arm, BP Patient Position: At rest)    Pulse 87    Temp 98.4 °F (36.9 °C)    Resp 16    Ht 5' 7\" (1.702 m)    Wt 102.8 kg (226 lb 9.6 oz)    SpO2 100%    BMI 35.49 kg/m2     Current Shift:     Last three shifts:  08/03 1901 - 08/05 0700  In: 2062 [P.O.:1080; I.V.:982]  Out: 600 [Urine:600]    Exam:    General: Well developed, alert, NAD, OX3  Head/Neck: NCAT, supple, No masses, No lymphadenopathy  CVS:Regular rate and rhythm, no M/R/G, S1/S2 heard, no thrill  Lungs:Clear to auscultation bilaterally, no wheezes, rhonchi, or rales  Abdomen: Soft, Nontender, No distention, Normal Bowel sounds, No hepatomegaly  Extremities: No C/C/E, pulses palpable 2+  Skin:severe chronic venous stasis changes with ulcerations to right lower extremity. Wound vac in place on LLE. Neuro:grossly normal, follows commands  Psych:appropriate                Labs: Results:       Chemistry Recent Labs      08/05/18   0507  08/04/18   0125   GLU  152*  180*   NA  135*  136   K  4.1  3.5   CL  104  102   CO2  27  28   BUN  13  19*   CREA  0.71  0.92   CA  8.7  9.7   AGAP  4  6   BUCR  18  21*   AP   --   99   TP   --   8.9*   ALB   --   2.8*   GLOB   --   6.1*   AGRAT   --   0.5*      CBC w/Diff Recent Labs      08/05/18   0507  08/04/18   0125   WBC  9.3  12.9   RBC  3.80*  4.46   HGB  10.0*  12.1   HCT  31.9*  37.1   PLT  181  226   GRANS  75*  89*   LYMPH  16*  7*   EOS  2  0      Cardiac Enzymes Recent Labs      08/05/18   0507  08/04/18   0615  08/04/18   0125   CPK  936*  1963*  2497*   CKND1   --    --   0.3      Coagulation No results for input(s): PTP, INR, APTT in the last 72 hours. No lab exists for component: INREXT    Lipid Panel No results found for: CHOL, CHOLPOCT, CHOLX, CHLST, CHOLV, 581732, HDL, LDL, LDLC, DLDLP, 935288, VLDLC, VLDL, TGLX, TRIGL, TRIGP, TGLPOCT, CHHD, CHHDX   BNP No results for input(s): BNPP in the last 72 hours.    Liver Enzymes Recent Labs      08/04/18   0125   TP  8.9*   ALB  2.8*   AP  99   SGOT  78*      Thyroid Studies No results found for: T4, T3U, TSH, TSHEXT     Procedures/imaging: see electronic medical records for all procedures/Xrays and details which were not copied into this note but were reviewed prior to creation of Nam Griggs MD

## 2018-08-05 NOTE — PROGRESS NOTES
[id number]  Patient complained of pain to the LLE 8/10 in the pain scale. Norco 10-325mg tab PO given at this time. Room #: 65/56     Age: 70 y.o. Code status: Full Code     Admission date: 8/3/2018         GLOS:TBA      Admitting MD: Jimmie Torres     Consults: Wound Care Nurse   A&Ox 4   Isolation: There are currently no Active Isolations     Precautions: none  Admission dx -   Encounter Diagnoses     ICD-10-CM ICD-9-CM   1. Weakness R53.1 780.79   2. Cellulitis of left lower extremity L03.116 682.6   3. Non-traumatic rhabdomyolysis M62.82 728.88   4. Lactic acid acidosis E87.2 276.2     Allergies: Allergies   Allergen Reactions    Codeine Rash and Itching    Percocet [Oxycodone-Acetaminophen] Nausea and Vomiting    Tramadol Rash and Itching     Plan - wound care consult, antibiotics.   Patient Vitals for the past 12 hrs:   Temp Pulse Resp BP SpO2   08/05/18 0718 98.4 °F (36.9 °C) 87 16 146/79 100 %   08/05/18 0354 98.9 °F (37.2 °C) 86 15 130/86 100 %   08/04/18 2342 98.2 °F (36.8 °C) 90 17 124/68 99 %        PMH: Past Medical History:   Diagnosis Date    Cellulitis     Chronic ulcer of left lower extremity with fat layer exposed [address] [address]     DM2 (diabetes mellitus, type 2) (HCC)     GERD (gastroesophageal reflux disease)     HTN (hypertension)     Hypercholesteremia     Venous insufficiency         Activity: Incentive spirometry 10 times per hour     Fall risk: yes     DVT prophy: Heparin  IV access: 24 ga Rt Hand     CIWA: n/a       CV - Tele: No     Box: n/a     Rhythm: n/a  Resp - O2: RA     Lung sounds: clear     IS: yes     GI/ - Urinary status: periwick/ incontinent   Abd - Last BM: 3 aug 18     Bowel sounds: active     Diet: DIET DIABETIC CONSISTENT CARB  Skin - LLE wound vacc     Pain - 1/10  Recent Results (from the past 12 hour(s))   LACTIC ACID    Collection Time: 08/05/18  5:03 AM   Result Value Ref Range    Lactic acid 1.1 0.4 - 2.0 MMOL/L   METABOLIC PANEL, BASIC    Collection Time: 08/05/18 5: 07 AM   Result Value Ref Range    Sodium 135 (L) 136 - 145 mmol/L    Potassium 4.1 3.5 - 5.5 mmol/L    Chloride 104 100 - 108 mmol/L    CO2 27 21 - 32 mmol/L    Anion gap 4 3.0 - 18 mmol/L    Glucose 152 (H) 74 - 99 mg/dL    BUN 13 7.0 - 18 MG/DL    Creatinine 0.71 0.6 - 1.3 MG/DL    BUN/Creatinine ratio 18 12 - 20      GFR est AA >60 >60 ml/min/1.73m2    GFR est non-AA >60 >60 ml/min/1.73m2    Calcium 8.7 8.5 - 10.1 MG/DL   CBC WITH AUTOMATED DIFF    Collection Time: 08/05/18  5:07 AM   Result Value Ref Range    WBC 9.3 4.6 - 13.2 K/uL    RBC 3.80 (L) 4.20 - 5.30 M/uL    HGB 10.0 (L) 12.0 - 16.0 g/dL    HCT 31.9 (L) 35.0 - 45.0 %    MCV 83.9 74.0 - 97.0 FL    MCH 26.3 24.0 - 34.0 PG    MCHC 31.3 31.0 - 37.0 g/dL    RDW 16.5 (H) 11.6 - 14.5 %    PLATELET 722 680 - 112 K/uL    MPV 9.5 9.2 - 11.8 FL    NEUTROPHILS 75 (H) 40 - 73 %    LYMPHOCYTES 16 (L) 21 - 52 %    MONOCYTES 7 3 - 10 %    EOSINOPHILS 2 0 - 5 %    BASOPHILS 0 0 - 2 %    ABS. NEUTROPHILS 7.0 1.8 - 8.0 K/UL    ABS. LYMPHOCYTES 1.5 0.9 - 3.6 K/UL    ABS. MONOCYTES 0.6 0.05 - 1.2 K/UL    ABS. EOSINOPHILS 0.2 0.0 - 0.4 K/UL    ABS.  BASOPHILS 0.0 0.0 - 0.1 K/UL    DF AUTOMATED     CK    Collection Time: 08/05/18  5:07 AM   Result Value Ref Range     (H) 26 - 192 U/L   GLUCOSE, POC    Collection Time: 08/05/18  7:21 AM   Result Value Ref Range    Glucose (POC) 120 (H) 70 - 110 mg/dL    Radiology/other results: wound care consult   Current Facility-Administered Medications:     0.9% sodium chloride infusion, 150 mL/hr, IntraVENous, CONTINUOUS, Alena Wilder MD    [facility]) tablet 60 mg- Patient supplied (Patient Supplied), 60 mg, Oral, DAILY, Ina Boyce MD    telmisartan (MICARDIS) tablet 40 mg, 40 mg, Oral, DAILY, Ina Boyce MD, 40 mg at 08/05/18 0853    polyethylene glycol (MIRALAX) packet 17 g, 17 g, Oral, DAILY, Ina Boyce MD    omeprazole (PRILOSEC) capsule 20 mg, 20 mg, Oral, DAILY, Mel Stringer MD Mary, 20 mg at 08/05/18 0854    multivitamin, tx-iron-ca-min (THERA-M w/ IRON) tablet 1 Tab, 1 Tab, Oral, DAILY, Son Pena MD, 1 Tab at 08/05/18 0732    gabapentin (NEURONTIN) capsule 100 mg, 100 mg, Oral, TID, Son Pena MD, 100 mg at 08/05/18 0854    fish oil-omega-3 fatty acids 340-1,000 mg capsule 1 Cap, 1 Cap, Oral, DAILY, Son Pena MD, 1 Cap at 08/05/18 8844    insulin lispro (HUMALOG) injection, , SubCUTAneous, AC&HS, Son Pena MD, Stopped at 08/05/18 6086    glucose chewable tablet 16 g, 4 Tab, Oral, PRN, Son Pena MD    glucagon (GLUCAGEN) injection 1 mg, 1 mg, IntraMUSCular, PRN, Son Pena MD    dextrose (D50W) injection syrg 12.5-25 g, 25-50 mL, IntraVENous, PRN, Son Pena MD    0.9% sodium chloride infusion, 125 mL/hr, IntraVENous, CONTINUOUS, Son Pena MD, Last Rate: 125 mL/hr at 08/05/18 0851, 125 mL/hr at 08/05/18 0851    acetaminophen (TYLENOL) suppository 650 mg, 650 mg, Rectal, Q4H PRN, Son Pena MD    heparin (porcine) injection 5,000 Units, 5,000 Units, SubCUTAneous, Q8H, Son Pena MD, 5,000 Units at 08/05/18 0326    Vancomycin- Pharmacy to dose, 1 Each, Other, Rx Dosing/Monitoring, Son Pena MD    vancomycin (VANCOCIN) 1500 mg in  ml infusion, 1,500 mg, IntraVENous, Q24H, Son Pena MD, Last Rate: 250 mL/hr at 08/04/18 2356, 1,500 mg at 08/04/18 2356    cefTRIAXone (ROCEPHIN) 1 g in sterile water (preservative free) 10 mL IV syringe, 1 g, IntraVENous, Q24H, Son Pena MD, 1 g at 08/05/18 0320    HYDROcodone-acetaminophen (NORCO)  mg tablet 1 Tab, 1 Tab, Oral, Q6H PRN, Son Pena MD, 1 Tab at 08/05/18 0854     1412hrs Patient complained of pain to the LLE of 10/10 in the pain scale. Norco  mg PO given at this time. Wound care RN at bedside.      1925hrs Bedside, Verbal and Written shift change report given to Yash Macedo RN (oncoming nurse) by Jennifer Brand (offgoing nurse). Report included the following information SBAR, Kardex, Procedure Summary, Intake/Output, MAR, Accordion, Recent Results and Med Rec Status. All questions answered, family at bedside.

## 2018-08-05 NOTE — ROUTINE PROCESS
Bedside and Verbal shift change report given to Son Selby RN (oncoming nurse) by Andrea Paul RN (offgoing nurse). Report included the following information SBAR, Kardex, ED Summary, Procedure Summary, Intake/Output, MAR, Recent Results and Med Rec Status.

## 2018-08-05 NOTE — WOUND CARE
Wound Care Progress Note       New consult placed by for ulcers to legs. Assessment:   Communication: A&O x 4,  communicative  Wearing briefs for incontinence. Requires partial assists in repositioning. Pre-medicated for pain by RN. Bilateral heel, buttocks, and sacral skin intact and without erythema. Generalized edema and erythema to left lower leg. 1. POA, left lateral lower leg wound = 11.7 x 11 x 0.4 cm    Base is 90% of pink/red tissue and 10% yellow slough. Large amount of tan/serosanginous exudate with odor present. Periwound macerated & with erythema. Treatment: aquacel to periwound, drape periwound, black sponge, drape, wound vac set to 125 mmHg  2. POA, right lower leg ulcers =Lateral- 1.5 x 2 x 0.3 cm; anterior- 2 x 3 x 0.3; medial- 0.5 x 0.5 x 0.1  Base is 90% of yellow slough tissue. Moderate amount of serosanginous exudate. Periwound intact & without erythema. Treatment: Aquacel and Mepilex border     Heels offloaded on pillow. Wound Recommendations:    Aquacel and/or Mesalt to right lower leg wounds to be changed twice weekly and PRN soiled/saturated. Wound vac with black sponge to left lower leg wound with aquacel ag PRN to macerated skin. Skin Care & Pressure Relief Recommendations:  Minimize layers of linen/pads under patient to optimize support surface. Turn/reposition approximately every 2 hours and offload heels pillows or Prevalon boots.   Manage incontinence / promote continence; Proshield to buttocks and sacrum daily and as needed with incontinence care    Discussed above plan with patient & Jacinda Lion MD    Transition of Care: Plan to follow weekly and per product recommendations while admitted to hospital.

## 2018-08-05 NOTE — PROGRESS NOTES
1930 - Bedside report obtained. Assumed care of patient. STAT lactic acid placed for lab draw. 1945 - On-call wound care staff paged to inquire about STAT consult. Will await response. 2000 - Shift assessment completed. Norco administered for 7/10 BLE pain. 20 g IV to right AC has infiltrated. Patient is a difficult stick, will seek additional resources for new IV access. 2200 - New IV site 24 g to right hand obtained by James Gonzalez RN.  
 
0000 - Vancomycin administered per [facility]  
 
0320 - Norco, Rocephin, and Heparin administered. 8059 - Vital signs obtained by CNA: Blood pressure 130/86, pulse 86, temperature 98.9 °F (37.2 °C), resp. rate 15, height 5' 7\" (1.702 m), weight 102.8 kg (226 lb 9.6 oz), SpO2 100 %.

## 2018-08-05 NOTE — PROGRESS NOTES
1930 
Bedside and Verbal shift change report given to SABRINA RosadoRN by Mayelin Messina RN. Report included the following information SBAR, Kardex, OR Summary, Intake/Output and MAR.

## 2018-08-06 LAB
ANION GAP SERPL CALC-SCNC: 6 MMOL/L (ref 3–18)
BUN SERPL-MCNC: 11 MG/DL (ref 7–18)
BUN/CREAT SERPL: 13 (ref 12–20)
CALCIUM SERPL-MCNC: 9.1 MG/DL (ref 8.5–10.1)
CHLORIDE SERPL-SCNC: 106 MMOL/L (ref 100–108)
CK SERPL-CCNC: 455 U/L (ref 26–192)
CO2 SERPL-SCNC: 26 MMOL/L (ref 21–32)
CREAT SERPL-MCNC: 0.83 MG/DL (ref 0.6–1.3)
GLUCOSE BLD STRIP.AUTO-MCNC: 173 MG/DL (ref 70–110)
GLUCOSE BLD STRIP.AUTO-MCNC: 189 MG/DL (ref 70–110)
GLUCOSE BLD STRIP.AUTO-MCNC: 192 MG/DL (ref 70–110)
GLUCOSE BLD STRIP.AUTO-MCNC: 216 MG/DL (ref 70–110)
GLUCOSE SERPL-MCNC: 212 MG/DL (ref 74–99)
POTASSIUM SERPL-SCNC: 3.9 MMOL/L (ref 3.5–5.5)
SODIUM SERPL-SCNC: 138 MMOL/L (ref 136–145)

## 2018-08-06 PROCEDURE — 74011636637 HC RX REV CODE- 636/637: Performed by: HOSPITALIST

## 2018-08-06 PROCEDURE — 74011250636 HC RX REV CODE- 250/636: Performed by: INTERNAL MEDICINE

## 2018-08-06 PROCEDURE — 82962 GLUCOSE BLOOD TEST: CPT

## 2018-08-06 PROCEDURE — 82550 ASSAY OF CK (CPK): CPT | Performed by: INTERNAL MEDICINE

## 2018-08-06 PROCEDURE — 80048 BASIC METABOLIC PNL TOTAL CA: CPT | Performed by: INTERNAL MEDICINE

## 2018-08-06 PROCEDURE — 36415 COLL VENOUS BLD VENIPUNCTURE: CPT | Performed by: INTERNAL MEDICINE

## 2018-08-06 PROCEDURE — 74011250637 HC RX REV CODE- 250/637: Performed by: INTERNAL MEDICINE

## 2018-08-06 PROCEDURE — A4216 STERILE WATER/SALINE, 10 ML: HCPCS | Performed by: INTERNAL MEDICINE

## 2018-08-06 PROCEDURE — 74011636637 HC RX REV CODE- 636/637: Performed by: INTERNAL MEDICINE

## 2018-08-06 PROCEDURE — 65270000029 HC RM PRIVATE

## 2018-08-06 RX ORDER — INSULIN GLARGINE 100 [IU]/ML
10 INJECTION, SOLUTION SUBCUTANEOUS
Status: DISCONTINUED | OUTPATIENT
Start: 2018-08-06 | End: 2018-08-13 | Stop reason: HOSPADM

## 2018-08-06 RX ADMIN — INSULIN LISPRO 3 UNITS: 100 INJECTION, SOLUTION INTRAVENOUS; SUBCUTANEOUS at 17:39

## 2018-08-06 RX ADMIN — SODIUM CHLORIDE 125 ML/HR: 900 INJECTION, SOLUTION INTRAVENOUS at 21:59

## 2018-08-06 RX ADMIN — INSULIN LISPRO 3 UNITS: 100 INJECTION, SOLUTION INTRAVENOUS; SUBCUTANEOUS at 12:39

## 2018-08-06 RX ADMIN — HYDROCODONE BITARTRATE AND ACETAMINOPHEN 2 TABLET: 10; 325 TABLET ORAL at 05:59

## 2018-08-06 RX ADMIN — INSULIN GLARGINE 10 UNITS: 100 INJECTION, SOLUTION SUBCUTANEOUS at 21:55

## 2018-08-06 RX ADMIN — HEPARIN SODIUM 5000 UNITS: 5000 INJECTION INTRAVENOUS; SUBCUTANEOUS at 12:39

## 2018-08-06 RX ADMIN — INSULIN LISPRO 3 UNITS: 100 INJECTION, SOLUTION INTRAVENOUS; SUBCUTANEOUS at 21:55

## 2018-08-06 RX ADMIN — GABAPENTIN 100 MG: 100 CAPSULE ORAL at 17:45

## 2018-08-06 RX ADMIN — HEPARIN SODIUM 5000 UNITS: 5000 INJECTION INTRAVENOUS; SUBCUTANEOUS at 05:59

## 2018-08-06 RX ADMIN — Medication 1 CAPSULE: at 08:12

## 2018-08-06 RX ADMIN — POLYETHYLENE GLYCOL 3350 17 G: 17 POWDER, FOR SOLUTION ORAL at 08:12

## 2018-08-06 RX ADMIN — MULTIPLE VITAMINS W/ MINERALS TAB 1 TABLET: TAB at 08:12

## 2018-08-06 RX ADMIN — WATER 1 G: 1 INJECTION INTRAMUSCULAR; INTRAVENOUS; SUBCUTANEOUS at 01:28

## 2018-08-06 RX ADMIN — INSULIN LISPRO 6 UNITS: 100 INJECTION, SOLUTION INTRAVENOUS; SUBCUTANEOUS at 07:30

## 2018-08-06 RX ADMIN — HYDROCODONE BITARTRATE AND ACETAMINOPHEN 2 TABLET: 10; 325 TABLET ORAL at 01:28

## 2018-08-06 RX ADMIN — SODIUM CHLORIDE 125 ML/HR: 900 INJECTION, SOLUTION INTRAVENOUS at 13:23

## 2018-08-06 RX ADMIN — OMEPRAZOLE 20 MG: 20 CAPSULE, DELAYED RELEASE ORAL at 08:12

## 2018-08-06 RX ADMIN — TELMISARTAN 40 MG: 40 TABLET ORAL at 08:12

## 2018-08-06 RX ADMIN — TROSPIUM CHLORIDE 60 MG: 20 TABLET, FILM COATED ORAL at 09:00

## 2018-08-06 RX ADMIN — GABAPENTIN 100 MG: 100 CAPSULE ORAL at 21:55

## 2018-08-06 RX ADMIN — HYDROCODONE BITARTRATE AND ACETAMINOPHEN 2 TABLET: 10; 325 TABLET ORAL at 20:15

## 2018-08-06 RX ADMIN — GABAPENTIN 100 MG: 100 CAPSULE ORAL at 08:12

## 2018-08-06 RX ADMIN — HEPARIN SODIUM 5000 UNITS: 5000 INJECTION INTRAVENOUS; SUBCUTANEOUS at 20:15

## 2018-08-06 NOTE — PROGRESS NOTES
Hospitalist Progress Note    Patient: Leni Maguire MRN: [id number]  CSN: [id number]    YOB: 1940  Age: 70 y.o. Sex: female    DOA: 8/3/2018 LOS:  LOS: 2 days                Assessment/Plan     Patient Active Problem List   Diagnosis Code    Rhabdomyolysis M62.82    Lactic acidosis E87.2    Leg ulcer ([address] L97.909    Venous insufficiency I87.2    HTN (hypertension) I10    Hypercholesteremia E78.00    DM2 (diabetes mellitus, type 2) ([address] E11.9    Chronic ulcer of left lower extremity with fat layer exposed ([facility] [address] T64.905    Cellulitis L03.90            69 yo female admitted for cellulitis, rhabdo    Rhabdomyolysis - Fluids; holding diuretic. Continue to monitor electrolytes  CK down to 455 this AM from 2497 on admission.       Cellulitis with leg ulcers - improving  Lactate has normalized  Continue broad spectrum abx with Rocephin and Vanc  Continue wound care        DM - start on lantuscontinue sliding scale   holding Januvia and Glipizide       Chronic pain - Continue Norco prn    Disposition : 2-3 days    Review of systems  General: No fevers or chills. Cardiovascular: No chest pain or pressure. No palpitations. Pulmonary: No shortness of breath. Gastrointestinal: No nausea, vomiting. Physical Exam:  General: Awake, cooperative, no acute distress    HEENT: NC, Atraumatic. PERRLA, anicteric sclerae. Lungs: CTA Bilaterally. No Wheezing/Rhonchi/Rales. Heart:  Regular  rhythm,  No murmur, No Rubs, No Gallops  Abdomen: Soft, Non distended, Non tender.  +Bowel sounds,   Extremities: LE swelling bilaterally, bilateral Unna boots  Psych:   Not anxious or agitated. Neurologic:  No acute neurological deficit.            Vital signs/Intake and Output:  Visit Vitals    /75 (BP 1 Location: Left arm, BP Patient Position: At rest)    Pulse 92    Temp 98.7 °F (37.1 °C)    Resp 18    Ht 5' 7\" (1.702 m)    Wt 108.4 kg (239 lb)    SpO2 100%    BMI 37.43 kg/m2     Current Shift:  08/06 0701 - 08/06 1900  In: 240 [P.O.:240]  Out: 950 [Urine:950]  Last three shifts:  08/04 1901 - 08/06 0700  In: 600 [P.O.:600]  Out: 1100 [Urine:1100]            Labs: Results:       Chemistry Recent Labs      08/06/18   0534  08/05/18   0507  08/04/18   0125   GLU  212*  152*  180*   NA  138  135*  136   K  3.9  4.1  3.5   CL  106  104  102   CO2  26  27  28   BUN  11  13  19*   CREA  0.83  0.71  0.92   CA  9.1  8.7  9.7   AGAP  6  4  6   BUCR  13  18  21*   AP   --    --   99   TP   --    --   8.9*   ALB   --    --   2.8*   GLOB   --    --   6.1*   AGRAT   --    --   0.5*      CBC w/Diff Recent Labs      08/05/18   0507 08/04/18   0125   WBC  9.3  12.9   RBC  3.80*  4.46   HGB  10.0*  12.1   HCT  31.9*  37.1   PLT  181  226   GRANS  75*  89*   LYMPH  16*  7*   EOS  2  0      Cardiac Enzymes Recent Labs      08/06/18   0534  08/05/18   0507   08/04/18   0125   CPK  455*  936*   < >  2497*   CKND1   --    --    --   0.3    < > = values in this interval not displayed. Coagulation No results for input(s): PTP, INR, APTT in the last 72 hours. No lab exists for component: INREXT, INREXT    Lipid Panel No results found for: CHOL, CHOLPOCT, CHOLX, CHLST, CHOLV, 331670, HDL, LDL, LDLC, DLDLP, 155478, VLDLC, VLDL, TGLX, TRIGL, TRIGP, TGLPOCT, CHHD, CHHDX   BNP No results for input(s): BNPP in the last 72 hours.    Liver Enzymes Recent Labs      08/04/18   0125   TP  8.9*   ALB  2.8*   AP  99   SGOT  78*      Thyroid Studies No results found for: T4, T3U, TSH, TSHEXT, TSHEXT     Procedures/imaging: see electronic medical records for all procedures/Xrays and details which were not copied into this note but were reviewed prior to creation of Plan

## 2018-08-06 NOTE — PROGRESS NOTES
1935 - Bedside report obtained. Assumed care of patient. 2000 - Shift assessment completed on patient. Patient is rating BLE pain 10/10 post wound care dressing change this evening. Patient has current orders for Alyssa De La Cruz, will speak with provider about additional medication. 2020 - Hospitalist (Dr Kermit Ruggiero) paged. 2030 - Telephone orders received to increase Norco order from 1 tab to 2 tabs and change frequency from every 6 hours to every 4 hours. 2100 - Second tab of Norco administered at this time. 2330 - Vancomycin administered per MAR. Vital signs obtained: Blood pressure 126/70, pulse 91, temperature 99.5 °F (37.5 °C), resp. rate 17, height 5' 7\" (1.702 m), weight 108.4 kg (239 lb), SpO2 99 %. 0130 - Rocephin and Gordon administered. 0340 - Vital signs obtained by CNA: Blood pressure 126/53, pulse 97, temperature 98.9 °F (37.2 °C), resp. rate 16, height 5' 7\" (1.702 m), weight 108.4 kg (239 lb), SpO2 95 %.    0600 - Heparin and Norco administered.

## 2018-08-06 NOTE — PROGRESS NOTES
Reason for Admission:   Fall                   RRAT Score:         Low; 8            Plan for utilizing home health:      Current with [facility]                    Likelihood of Readmission:  Low                          Transition of Care Plan:      [facility] vs SNF    Pt admitted due to a fall. Pt with a history of chronic leg ulcer requiring wound vac management. CM met with pt at bedside and she has indicated she is seen at the wound care clinic at [facility]. Pt lives alone and her daughter lives near by. Pt has 2 rollators that she uses to ambulate. Pt indicated prior to admission she was driving and taking care of her ADLs. CM discussed [facility] services and offered FOC. Pt would like to return home with [facility] and continue with [facility]. CMS has been notified to assist.  Pt with a pending surgical consult. Pt is also being seen by wound care. Pt's transition of care needs are unclear at this time. CM to continue to follow and await outcome of consults to assist with identifying an appropriate transition of care. Please order therapy services to assist with transition of care planning. Care Management Interventions  PCP Verified by CM:  Yes  Transition of Care Consult (CM Consult): [address]: No  Reason Outside [place]: Patient already serviced by other home care/hospice agency (current with [facility])  Health Maintenance Reviewed: Yes  Current Support Network: Lives Alone, Family Lives Nearby  Confirm Follow Up Transport: Self  Plan discussed with Pt/Family/Caregiver: Yes  Freedom of Choice Offered: Yes  Discharge Location  Discharge Placement: Home with home health

## 2018-08-06 NOTE — PROGRESS NOTES
Occupational Therapy Screening:  Services are indicated. Evaluate and Treat Order is requested. [facility] was triggered for occupational therapy based on results obtained during the nursing admission assessment. The patients chart was reviewed and the patient is appropriate for a skilled therapy evaluation. Patient admitted s/p fall w/ chronic LE wounds followed by Joy Feldman. Pt reports decline in mobility w/ RW. Please order a consult for occupational therapy if you are in agreement and would like an evaluation to be completed. Thank you.   Alberta Peña, OTR/L

## 2018-08-06 NOTE — ROUTINE PROCESS
Bedside and Verbal shift change report given to Macario Mackay RN (oncoming nurse) by Evette Rivas RN (offgoing nurse). Report included the following information SBAR, Kardex, ED Summary, Procedure Summary, Intake/Output, MAR, Recent Results and Med Rec Status.

## 2018-08-06 NOTE — DIABETES MGMT
GLYCEMIC CONTROL SCREENING INITIATED:    -known h/o T2DM, HbA1C within recommended range for age + comorbids on oral home regimen  -TDD = 15 units - Humalog Very Insulin Resistant Corrective Coverage  -both FBG & PPG out of target range recommend basal insulin   *Lantus 8 units daily   *- Humalog Normal Insulin Sensitivity Corrective Coverage      Lab Results   Component Value Date/Time    Hemoglobin A1c 6.5 (H) 08/04/2018 01:25 AM      Lab Results   Component Value Date/Time    Glucose 212 (H) 08/06/2018 05:34 AM         [x]  Glucose values exceeding inpatient target range: -180mg/dl            non-ICU 70-140mg/dl      []  Patient meets criteria for pre-diabetes   HbA1c = 5.7-6.4%      [x]  Patient has h/o diabetes HbA1c ? 6.5%      []  Recommend discontinuing oral anti-diabetic medications until discharge. [x]  Recommend initiate basal insulin per IP Insulin order set. []  Recommend meal time insulin per IP Insulin order set. [x]  Recommend corrective insulin per IP Insulin order set. []  Standard insulin scale  i[]  Very insuln resistant scale       []  Patient meets criteria for IV Insulin Infusion/GlucoStabilizer order set. []  Patient has had a hypoglycemic event, recommend      [x]  Recommend continue blood glucose monitoring. []  Patient will need prescription for glucometer, test strips and lancets. [x]  Recommend continue carbohydrate controlled diabetic diet. []  Diabetes Self-Management class schedule provided and patient encouraged to attend.     [] Other    Markos Piedra RN, MS  Glycemic Control Team  Pager [phone])  *After Hours pager [phone]

## 2018-08-07 LAB
ANION GAP SERPL CALC-SCNC: 6 MMOL/L (ref 3–18)
BUN SERPL-MCNC: 7 MG/DL (ref 7–18)
BUN/CREAT SERPL: 11 (ref 12–20)
CALCIUM SERPL-MCNC: 9.2 MG/DL (ref 8.5–10.1)
CHLORIDE SERPL-SCNC: 105 MMOL/L (ref 100–108)
CO2 SERPL-SCNC: 28 MMOL/L (ref 21–32)
CREAT SERPL-MCNC: 0.63 MG/DL (ref 0.6–1.3)
GLUCOSE BLD STRIP.AUTO-MCNC: 136 MG/DL (ref 70–110)
GLUCOSE BLD STRIP.AUTO-MCNC: 138 MG/DL (ref 70–110)
GLUCOSE BLD STRIP.AUTO-MCNC: 174 MG/DL (ref 70–110)
GLUCOSE BLD STRIP.AUTO-MCNC: 215 MG/DL (ref 70–110)
GLUCOSE SERPL-MCNC: 141 MG/DL (ref 74–99)
POTASSIUM SERPL-SCNC: 3.7 MMOL/L (ref 3.5–5.5)
SODIUM SERPL-SCNC: 139 MMOL/L (ref 136–145)

## 2018-08-07 PROCEDURE — 65270000029 HC RM PRIVATE

## 2018-08-07 PROCEDURE — 74011250636 HC RX REV CODE- 250/636: Performed by: HOSPITALIST

## 2018-08-07 PROCEDURE — 77030027138 HC INCENT SPIROMETER -A

## 2018-08-07 PROCEDURE — 74011636637 HC RX REV CODE- 636/637: Performed by: HOSPITALIST

## 2018-08-07 PROCEDURE — 36415 COLL VENOUS BLD VENIPUNCTURE: CPT | Performed by: INTERNAL MEDICINE

## 2018-08-07 PROCEDURE — 82962 GLUCOSE BLOOD TEST: CPT

## 2018-08-07 PROCEDURE — 80048 BASIC METABOLIC PNL TOTAL CA: CPT | Performed by: INTERNAL MEDICINE

## 2018-08-07 PROCEDURE — A4216 STERILE WATER/SALINE, 10 ML: HCPCS | Performed by: INTERNAL MEDICINE

## 2018-08-07 PROCEDURE — 74011250636 HC RX REV CODE- 250/636: Performed by: INTERNAL MEDICINE

## 2018-08-07 PROCEDURE — 74011636637 HC RX REV CODE- 636/637: Performed by: INTERNAL MEDICINE

## 2018-08-07 PROCEDURE — 97163 PT EVAL HIGH COMPLEX 45 MIN: CPT

## 2018-08-07 PROCEDURE — 74011250637 HC RX REV CODE- 250/637: Performed by: INTERNAL MEDICINE

## 2018-08-07 RX ORDER — MORPHINE SULFATE 2 MG/ML
2 INJECTION, SOLUTION INTRAMUSCULAR; INTRAVENOUS
Status: DISCONTINUED | OUTPATIENT
Start: 2018-08-07 | End: 2018-08-13 | Stop reason: HOSPADM

## 2018-08-07 RX ORDER — SIMVASTATIN 20 MG/1
20 TABLET, FILM COATED ORAL
COMMUNITY

## 2018-08-07 RX ORDER — TROSPIUM CHLORIDE ER 60 MG/1
60 CAPSULE ORAL
COMMUNITY

## 2018-08-07 RX ORDER — LEVOFLOXACIN 5 MG/ML
750 INJECTION, SOLUTION INTRAVENOUS EVERY 24 HOURS
Status: DISCONTINUED | OUTPATIENT
Start: 2018-08-07 | End: 2018-08-10

## 2018-08-07 RX ORDER — HYDROCODONE BITARTRATE AND ACETAMINOPHEN 5; 300 MG/1; MG/1
1 TABLET ORAL
COMMUNITY
End: 2018-08-13

## 2018-08-07 RX ORDER — TELMISARTAN 80 MG/1
80 TABLET ORAL DAILY
COMMUNITY
End: 2018-08-13

## 2018-08-07 RX ORDER — IPRATROPIUM BROMIDE AND ALBUTEROL SULFATE 2.5; .5 MG/3ML; MG/3ML
3 SOLUTION RESPIRATORY (INHALATION)
Status: DISCONTINUED | OUTPATIENT
Start: 2018-08-07 | End: 2018-08-13 | Stop reason: HOSPADM

## 2018-08-07 RX ADMIN — INSULIN GLARGINE 10 UNITS: 100 INJECTION, SOLUTION SUBCUTANEOUS at 22:30

## 2018-08-07 RX ADMIN — HEPARIN SODIUM 5000 UNITS: 5000 INJECTION INTRAVENOUS; SUBCUTANEOUS at 04:28

## 2018-08-07 RX ADMIN — SODIUM CHLORIDE 75 ML/HR: 900 INJECTION, SOLUTION INTRAVENOUS at 15:46

## 2018-08-07 RX ADMIN — OMEPRAZOLE 20 MG: 20 CAPSULE, DELAYED RELEASE ORAL at 08:55

## 2018-08-07 RX ADMIN — MULTIPLE VITAMINS W/ MINERALS TAB 1 TABLET: TAB at 08:56

## 2018-08-07 RX ADMIN — ACETAMINOPHEN 650 MG: 650 SUPPOSITORY RECTAL at 20:18

## 2018-08-07 RX ADMIN — MORPHINE SULFATE 2 MG: 2 INJECTION, SOLUTION INTRAMUSCULAR; INTRAVENOUS at 04:28

## 2018-08-07 RX ADMIN — GABAPENTIN 100 MG: 100 CAPSULE ORAL at 15:46

## 2018-08-07 RX ADMIN — HYDROCODONE BITARTRATE AND ACETAMINOPHEN 2 TABLET: 10; 325 TABLET ORAL at 23:55

## 2018-08-07 RX ADMIN — POLYETHYLENE GLYCOL 3350 17 G: 17 POWDER, FOR SOLUTION ORAL at 08:55

## 2018-08-07 RX ADMIN — LEVOFLOXACIN 750 MG: 5 INJECTION, SOLUTION INTRAVENOUS at 22:32

## 2018-08-07 RX ADMIN — GABAPENTIN 100 MG: 100 CAPSULE ORAL at 22:30

## 2018-08-07 RX ADMIN — INSULIN LISPRO 3 UNITS: 100 INJECTION, SOLUTION INTRAVENOUS; SUBCUTANEOUS at 22:31

## 2018-08-07 RX ADMIN — GABAPENTIN 100 MG: 100 CAPSULE ORAL at 08:56

## 2018-08-07 RX ADMIN — Medication 1 CAPSULE: at 08:55

## 2018-08-07 RX ADMIN — VANCOMYCIN HYDROCHLORIDE 1500 MG: 10 INJECTION, POWDER, LYOPHILIZED, FOR SOLUTION INTRAVENOUS at 00:09

## 2018-08-07 RX ADMIN — MORPHINE SULFATE 2 MG: 2 INJECTION, SOLUTION INTRAMUSCULAR; INTRAVENOUS at 08:56

## 2018-08-07 RX ADMIN — HYDROCODONE BITARTRATE AND ACETAMINOPHEN 2 TABLET: 10; 325 TABLET ORAL at 01:31

## 2018-08-07 RX ADMIN — HEPARIN SODIUM 5000 UNITS: 5000 INJECTION INTRAVENOUS; SUBCUTANEOUS at 20:19

## 2018-08-07 RX ADMIN — TROSPIUM CHLORIDE 60 MG: 20 TABLET, FILM COATED ORAL at 09:02

## 2018-08-07 RX ADMIN — HYDROCODONE BITARTRATE AND ACETAMINOPHEN 2 TABLET: 10; 325 TABLET ORAL at 18:47

## 2018-08-07 RX ADMIN — HEPARIN SODIUM 5000 UNITS: 5000 INJECTION INTRAVENOUS; SUBCUTANEOUS at 12:54

## 2018-08-07 RX ADMIN — MORPHINE SULFATE 2 MG: 2 INJECTION, SOLUTION INTRAMUSCULAR; INTRAVENOUS at 15:47

## 2018-08-07 RX ADMIN — WATER 1 G: 1 INJECTION INTRAMUSCULAR; INTRAVENOUS; SUBCUTANEOUS at 01:31

## 2018-08-07 RX ADMIN — TELMISARTAN 40 MG: 40 TABLET ORAL at 08:55

## 2018-08-07 RX ADMIN — INSULIN LISPRO 6 UNITS: 100 INJECTION, SOLUTION INTRAVENOUS; SUBCUTANEOUS at 18:47

## 2018-08-07 RX ADMIN — MORPHINE SULFATE 2 MG: 2 INJECTION, SOLUTION INTRAMUSCULAR; INTRAVENOUS at 20:18

## 2018-08-07 NOTE — PROGRESS NOTES
Patient alert and oriented x4, denies pain at this time. Bilateral extremities elevated. Last dressing changed on 8/5/18 by wound care. 0945hrs Kelley Michelle RN at bedside. 1945hrs Bedside, Verbal and Written shift change report given to Reji Carias RN (oncoming nurse) by Brittaney Guy RN (offgoing nurse). Report included the following information SBAR, Kardex, Procedure Summary, Intake/Output, MAR, Accordion, Recent Results and Med Rec Status. All questions answered. Family at bedside.

## 2018-08-07 NOTE — PROGRESS NOTES
Admission Medication Reconciliation has been performed on this patient consisting of interview of the patient regarding their PTA Home Medication List, Allergies and PMH as well as obtaining outpatient pharmacy information. Interviewed patient who was not a good historian. Patient did not provide a written list of home medications. Patient's outpatient pharmacy is [facility]  Medications are managed by self  Smoking status is denies  Alcohol use denies  Illicit drug use denies  Patient ABX use within the past 30 days = cipro on 7/2/17, Bactrim DS on 7/2 and 6/2  Has patient received any antineoplastics in the past 30 days? unknown  Has patient received any radiation treatments in the past 45 days? unknown    PAML was  marked complete and did  require modification. Admission orders have  already been written. Medication Compliance Issues and/or Medication Concerns:   Pt is a poor historian and unable to tell me what she takes other than \"telmisartan\". Updated PAML with info from [facility].  1. Added norco and zocor to [facility]  2. Modified telmisartan and sanctura on PAML. Tigertexted Dr. Hardy Ochoa regarding clinically significant changes made to [facility] after admission orders written.     [address] [place] [address] Pharmacist  [phone]

## 2018-08-07 NOTE — PROGRESS NOTES
1940- Pt care received. Resting in bed. Bilateral upper lungs clear, lower lungs with scattered wheezing. Encourage IS use. 2001- MEWS score 5. Temp 102.1, .    2018- Tylenol suppository given. 2211- MEWS score 2. Temp 100.6, . O2 sat 91% on room air. 0- Notified Dr. Chuy Howard of pt's fever, tachycardia, and interventions taken. Informed her of daughter's concerns about pt's swelling in BLE and wheezing. Telephone orders received for CBC, CMP, CK, CXR in the AM, duo-neb QID PRN, and stop IVF.     0002- O2 sat 98% on 2 liters O2 nasal cannula. . Will continue to monitor. 0030- Unable to draw vanc trough. Lab made aware. Phlebotomist to come and draw blood.

## 2018-08-07 NOTE — PROGRESS NOTES

## 2018-08-07 NOTE — ROUTINE PROCESS
Bedside and Verbal shift change report given to JERRY Balderas (oncoming nurse) by Gabriele Maynard RN   (offgoing nurse). Report included the following information SBAR, Kardex, Intake/Output, MAR and Recent Results.

## 2018-08-07 NOTE — PROGRESS NOTES
Problem: Mobility Impaired (Adult and Pediatric)  Goal: *Acute Goals and Plan of Care (Insert Text)  Physical TherapyGoals   Initiated 8/7/2018 to be met within 7 days  1. Bed mobility: Rolling L to R to with mod A with use of HR for positioning. 2. Transfer: Supine to/from Sit with mod A with HR for change of position/prep for ambulation. 3. Activity Tolerance: Tolerate EOB sitting 5-10 minutes for ADL/balance activities. 4. Transfer: Sit to/from Stand with mod assist/RW in prep for ambulation. 4. Ambulation:  Ambulate 5-20 ft. max/mod A with RW for increased functional mobility. Outcome: Progressing Towards Goal  physical Therapy EVALUATION    Patient: Ollei Spencer (37 y.o. female)  Date: 8/7/2018  Primary Diagnosis: Rhabdomyolysis  Lactic acidosis  Leg ulcer (HCC)  Precautions:Fall    ASSESSMENT :  Based on the objective data described below, the patient is a 69 yo F seen on 3S who presents with decreased independence in functional mobility with regard to bed mobility, transfers, gait, balance and activity tolerance due to decreased ROM/motor performance following fall in home prior to admission. Pt with bilateral LL dressings and compression wraps in place and wound vac L LL. L LE also erythematous and both LE's edematous. PT with mild audible wheezing during conversation and activity. O2 sat 94% to 100% on room air and HR in 1 teens with activity. Pt requires max/total assist to LE's during rolling. Further transfer/gt ability to be assessed on f/u visit. Pt left supine with all needs in reach, and nurse Aura Left notified of continued wheezing. Recommend SNF for follow up physical therapy upon discharge to reach maximal level of independence/safety with functional mobility. Pt Education: pt educated in safety/technique during functional mobility tasks     Patient will benefit from skilled intervention to address the above impairments.   Patients rehabilitation potential is considered to be Fair  Factors which may influence rehabilitation potential include:   []         None noted  []         Mental ability/status  []         Medical condition  []         Home/family situation and support systems  []         Safety awareness  []         Pain tolerance/management  []         Other:      PLAN :  Recommendations and Planned Interventions:  []           Bed Mobility Training             []    Neuromuscular Re-Education  []           Transfer Training                   []    Orthotic/Prosthetic Training  []           Gait Training                          []    Modalities  []           Therapeutic Exercises          []    Edema Management/Control  []           Therapeutic Activities            []    Patient and Family Training/Education  []           Other (comment):    Frequency/Duration: Patient will be followed by physical therapy 1-2 times per day to address goals. Discharge Recommendations: [facility]  Further Equipment Recommendations for Discharge: N/A     SUBJECTIVE:   Patient stated Toes still aching both feet.     OBJECTIVE DATA SUMMARY:     Past Medical History:   Diagnosis Date    Cellulitis     Chronic ulcer of left lower extremity with fat layer exposed [facility] [address]     DM2 (diabetes mellitus, type 2) ([facility] [address]     GERD (gastroesophageal reflux disease)     HTN (hypertension)     Hypercholesteremia     Venous insufficiency    History reviewed. No pertinent surgical history.   Barriers to Learning/Limitations: yes;  physical  Compensate with: Visual Cues, Verbal Cues and Tactile Cues  Prior Level of Function/Home Situation: independent PTA with rollator, driving  Home Situation  Home Environment: Private residence  # Steps to Enter: 0  One/Two Story Residence: One story  Living Alone: Yes  Support Systems: Child(greta)  Patient Expects to be Discharged to[de-identified] Skilled nursing facility  Current DME Used/Available at Home: [place], straight, Cane, quad, Raised toilet seat, Walker, rollator, Shower chair  Tub or Shower Type: Shower (and tub/showr combo)  Critical Behavior:  Neurologic State: Alert; Appropriate for age  Orientation Level: Oriented X4  Cognition: Appropriate safety awareness; Appropriate for age attention/concentration; Appropriate decision making; Follows commands  Safety/Judgement: Fall prevention; Awareness of environment; Insight into deficits  Psychosocial  Patient Behaviors: Calm; Cooperative  Purposeful Interaction: Yes  Pt Identified Daily Priority: Clinical issues (comment)  Caritas Process: Nurture loving kindness  Caring Interventions: Reassure  Reassure: Informing  Skin Condition/Temp: Dry;Warm  Skin Integrity: Wound (add Wound LDA) (BLE)  Skin Integumentary  Skin Color: Appropriate for ethnicity  Skin Condition/Temp: Dry;Warm  Skin Integrity: Wound (add Wound LDA) (BLE)  Turgor: Non-tenting  Hair Growth: Present  Strength:    Strength: Generally decreased, functional  Tone & Sensation:   Tone: Normal  Sensation: Intact  Range Of Motion:  AROM: Generally decreased, functional  PROM: Generally decreased, functional  Functional Mobility:  Bed Mobility:  Rolling: Total assistance (to LE's with ues of HR's)  Supine to Sit:  (tba)  Pain:  Pain Scale 1: Numeric (0 - 10)  Pain Intensity 1: 10  Pain Location 1: Foot;Leg  Pain Orientation 1: Left  Pain Description 1: Aching;Burning; Throbbing  Pain Intervention(s) 1: Medication (see MAR)  Activity Tolerance:   Fair   Please refer to the flowsheet for vital signs taken during this treatment. After treatment:   []         Patient left in no apparent distress sitting up in chair  [x]         Patient left in no apparent distress in bed  [x]         Call bell left within reach  [x]         Nursing notified  []         Caregiver present  []         Bed alarm activated    COMMUNICATION/EDUCATION:   [x]         Fall prevention education was provided and the patient/caregiver indicated understanding.   [x]         Patient/family have participated as able in goal setting and plan of care. [x]         Patient/family agree to work toward stated goals and plan of care. []         Patient understands intent and goals of therapy, but is neutral about his/her participation. []         Patient is unable to participate in goal setting and plan of care. Eval Complexity: History: HIGH Complexity :3+ comorbidities / personal factors will impact the outcome/ POC Exam:HIGH Complexity : 4+ Standardized tests and measures addressing body structure, function, activity limitation and / or participation in recreation  Presentation: MEDIUM Complexity : Evolving with changing characteristics  Clinical Decision Making:Medium Complexity non ambulatory at this time Overall Complexity:HIGH     G-Codes (GP)  Mobility  [id number] Current  CM= 80-99%   Goal  CK= 40-59%  The severity rating is based on the functional mobility assessment.     Thank you for this referral.  Jorge Alberto Hurtado, PT   Time Calculation: 15 mins

## 2018-08-07 NOTE — PROGRESS NOTES
0- Notified Dr. Ray Kidd of pt's audible wheezing. Telephone order received to decrease IVF to 75 ml/hr. 18- Notified Dr. Ray Kidd of pt's BLE pain rated 10/10 unrelieved with norco, ice, and repositioning. Telephone order received for IV morphine 2 mg Q4H PRN. Shift summary: Pt pleasant and cooperative. Fever, improved. C/o BLE pain 10/10, norco given with no relief. Elevated BLE on pillows and ice applied, pt continued to rate pain 10/10. Notified Dr. Ray Kidd and received order for IV morphine 2 mg Q4H PRN. Wheezing heard, telephone order received to decrease IVF to 75 ml/hr. Given incentive spirometer. Pt dressings to BLE CDI. Wound vac in place, cannister at 75 ml serosanguinous output.      Patient Vitals for the past 12 hrs:   Temp Pulse Resp BP SpO2   08/07/18 0356 98.4 °F (36.9 °C) 90 18 123/72 97 %   08/06/18 2300 99.8 °F (37.7 °C) 91 18 130/59 98 %   08/06/18 1944 100.3 °F (37.9 °C) 95 18 144/76 97 %

## 2018-08-08 ENCOUNTER — APPOINTMENT (OUTPATIENT)
Dept: GENERAL RADIOLOGY | Age: 78
DRG: 558 | End: 2018-08-08
Attending: INTERNAL MEDICINE
Payer: MEDICARE

## 2018-08-08 LAB
ALBUMIN SERPL-MCNC: 1.6 G/DL (ref 3.4–5)
ALBUMIN/GLOB SERPL: 0.3 {RATIO} (ref 0.8–1.7)
ALP SERPL-CCNC: 84 U/L (ref 45–117)
ALT SERPL-CCNC: 33 U/L (ref 13–56)
ANION GAP SERPL CALC-SCNC: 7 MMOL/L (ref 3–18)
AST SERPL-CCNC: 29 U/L (ref 15–37)
BASOPHILS # BLD: 0 K/UL (ref 0–0.1)
BASOPHILS NFR BLD: 0 % (ref 0–3)
BILIRUB SERPL-MCNC: 0.3 MG/DL (ref 0.2–1)
BUN SERPL-MCNC: 6 MG/DL (ref 7–18)
BUN/CREAT SERPL: 9 (ref 12–20)
CALCIUM SERPL-MCNC: 9 MG/DL (ref 8.5–10.1)
CHLORIDE SERPL-SCNC: 105 MMOL/L (ref 100–108)
CK SERPL-CCNC: 147 U/L (ref 26–192)
CO2 SERPL-SCNC: 28 MMOL/L (ref 21–32)
CREAT SERPL-MCNC: 0.65 MG/DL (ref 0.6–1.3)
DATE LAST DOSE: ABNORMAL
DIFFERENTIAL METHOD BLD: ABNORMAL
EOSINOPHIL # BLD: 0 K/UL (ref 0–0.4)
EOSINOPHIL NFR BLD: 0 % (ref 0–5)
ERYTHROCYTE [DISTWIDTH] IN BLOOD BY AUTOMATED COUNT: 16.4 % (ref 11.6–14.5)
GLOBULIN SER CALC-MCNC: 4.8 G/DL (ref 2–4)
GLUCOSE BLD STRIP.AUTO-MCNC: 117 MG/DL (ref 70–110)
GLUCOSE BLD STRIP.AUTO-MCNC: 181 MG/DL (ref 70–110)
GLUCOSE BLD STRIP.AUTO-MCNC: 226 MG/DL (ref 70–110)
GLUCOSE BLD STRIP.AUTO-MCNC: 230 MG/DL (ref 70–110)
GLUCOSE SERPL-MCNC: 152 MG/DL (ref 74–99)
HCT VFR BLD AUTO: 27.8 % (ref 35–45)
HGB BLD-MCNC: 8.9 G/DL (ref 12–16)
LYMPHOCYTES # BLD: 1.8 K/UL (ref 0.8–3.5)
LYMPHOCYTES NFR BLD: 14 % (ref 20–51)
MCH RBC QN AUTO: 26.3 PG (ref 24–34)
MCHC RBC AUTO-ENTMCNC: 32 G/DL (ref 31–37)
MCV RBC AUTO: 82.2 FL (ref 74–97)
MONOCYTES # BLD: 1.1 K/UL (ref 0–1)
MONOCYTES NFR BLD: 8 % (ref 2–9)
NEUTS BAND NFR BLD MANUAL: 1 % (ref 0–5)
NEUTS SEG # BLD: 10.2 K/UL (ref 1.8–8)
NEUTS SEG NFR BLD: 77 % (ref 42–75)
PLATELET # BLD AUTO: 221 K/UL (ref 135–420)
PLATELET COMMENTS,PCOM: ABNORMAL
PMV BLD AUTO: 9.6 FL (ref 9.2–11.8)
POTASSIUM SERPL-SCNC: 3.3 MMOL/L (ref 3.5–5.5)
PROT SERPL-MCNC: 6.4 G/DL (ref 6.4–8.2)
RBC # BLD AUTO: 3.38 M/UL (ref 4.2–5.3)
RBC MORPH BLD: ABNORMAL
RBC MORPH BLD: ABNORMAL
REPORTED DOSE,DOSE: ABNORMAL UNITS
REPORTED DOSE/TIME,TMG: 9
SODIUM SERPL-SCNC: 140 MMOL/L (ref 136–145)
VANCOMYCIN TROUGH SERPL-MCNC: 2.9 UG/ML (ref 10–20)
WBC # BLD AUTO: 13.2 K/UL (ref 4.6–13.2)
WBC MORPH BLD: ABNORMAL

## 2018-08-08 PROCEDURE — 74011250637 HC RX REV CODE- 250/637: Performed by: INTERNAL MEDICINE

## 2018-08-08 PROCEDURE — 94640 AIRWAY INHALATION TREATMENT: CPT

## 2018-08-08 PROCEDURE — 36415 COLL VENOUS BLD VENIPUNCTURE: CPT | Performed by: INTERNAL MEDICINE

## 2018-08-08 PROCEDURE — 74011636637 HC RX REV CODE- 636/637: Performed by: HOSPITALIST

## 2018-08-08 PROCEDURE — A4216 STERILE WATER/SALINE, 10 ML: HCPCS | Performed by: INTERNAL MEDICINE

## 2018-08-08 PROCEDURE — 94760 N-INVAS EAR/PLS OXIMETRY 1: CPT

## 2018-08-08 PROCEDURE — 82550 ASSAY OF CK (CPK): CPT | Performed by: INTERNAL MEDICINE

## 2018-08-08 PROCEDURE — 97167 OT EVAL HIGH COMPLEX 60 MIN: CPT

## 2018-08-08 PROCEDURE — 77030013140 HC MSK NEB VYRM -A

## 2018-08-08 PROCEDURE — 97535 SELF CARE MNGMENT TRAINING: CPT

## 2018-08-08 PROCEDURE — 77010033678 HC OXYGEN DAILY

## 2018-08-08 PROCEDURE — 97530 THERAPEUTIC ACTIVITIES: CPT

## 2018-08-08 PROCEDURE — 71045 X-RAY EXAM CHEST 1 VIEW: CPT

## 2018-08-08 PROCEDURE — 85025 COMPLETE CBC W/AUTO DIFF WBC: CPT | Performed by: INTERNAL MEDICINE

## 2018-08-08 PROCEDURE — 80053 COMPREHEN METABOLIC PANEL: CPT | Performed by: INTERNAL MEDICINE

## 2018-08-08 PROCEDURE — 65270000029 HC RM PRIVATE

## 2018-08-08 PROCEDURE — 82962 GLUCOSE BLOOD TEST: CPT

## 2018-08-08 PROCEDURE — 74011636637 HC RX REV CODE- 636/637: Performed by: INTERNAL MEDICINE

## 2018-08-08 PROCEDURE — 97110 THERAPEUTIC EXERCISES: CPT

## 2018-08-08 PROCEDURE — 74011250636 HC RX REV CODE- 250/636: Performed by: INTERNAL MEDICINE

## 2018-08-08 PROCEDURE — 80202 ASSAY OF VANCOMYCIN: CPT | Performed by: INTERNAL MEDICINE

## 2018-08-08 PROCEDURE — 74011000250 HC RX REV CODE- 250: Performed by: INTERNAL MEDICINE

## 2018-08-08 PROCEDURE — 74011250636 HC RX REV CODE- 250/636: Performed by: HOSPITALIST

## 2018-08-08 PROCEDURE — 74011250637 HC RX REV CODE- 250/637: Performed by: HOSPITALIST

## 2018-08-08 RX ORDER — POTASSIUM CHLORIDE 20 MEQ/1
40 TABLET, EXTENDED RELEASE ORAL
Status: COMPLETED | OUTPATIENT
Start: 2018-08-08 | End: 2018-08-08

## 2018-08-08 RX ORDER — FUROSEMIDE 10 MG/ML
40 INJECTION INTRAMUSCULAR; INTRAVENOUS DAILY
Status: DISCONTINUED | OUTPATIENT
Start: 2018-08-08 | End: 2018-08-10

## 2018-08-08 RX ORDER — HYDROCODONE BITARTRATE AND ACETAMINOPHEN 10; 325 MG/1; MG/1
1 TABLET ORAL
Status: DISCONTINUED | OUTPATIENT
Start: 2018-08-08 | End: 2018-08-13 | Stop reason: HOSPADM

## 2018-08-08 RX ORDER — VANCOMYCIN/0.9 % SOD CHLORIDE 1.5G/250ML
1500 PLASTIC BAG, INJECTION (ML) INTRAVENOUS EVERY 12 HOURS
Status: DISCONTINUED | OUTPATIENT
Start: 2018-08-08 | End: 2018-08-10

## 2018-08-08 RX ADMIN — POTASSIUM CHLORIDE 40 MEQ: 1500 TABLET, EXTENDED RELEASE ORAL at 12:29

## 2018-08-08 RX ADMIN — TROSPIUM CHLORIDE 60 MG: 20 TABLET, FILM COATED ORAL at 09:02

## 2018-08-08 RX ADMIN — LEVOFLOXACIN 750 MG: 5 INJECTION, SOLUTION INTRAVENOUS at 21:55

## 2018-08-08 RX ADMIN — VANCOMYCIN HYDROCHLORIDE 1500 MG: 10 INJECTION, POWDER, LYOPHILIZED, FOR SOLUTION INTRAVENOUS at 14:34

## 2018-08-08 RX ADMIN — IPRATROPIUM BROMIDE AND ALBUTEROL SULFATE 3 ML: .5; 3 SOLUTION RESPIRATORY (INHALATION) at 12:45

## 2018-08-08 RX ADMIN — HEPARIN SODIUM 5000 UNITS: 5000 INJECTION INTRAVENOUS; SUBCUTANEOUS at 12:29

## 2018-08-08 RX ADMIN — ACETAMINOPHEN 650 MG: 650 SUPPOSITORY RECTAL at 21:55

## 2018-08-08 RX ADMIN — MULTIPLE VITAMINS W/ MINERALS TAB 1 TABLET: TAB at 08:58

## 2018-08-08 RX ADMIN — INSULIN LISPRO 6 UNITS: 100 INJECTION, SOLUTION INTRAVENOUS; SUBCUTANEOUS at 16:54

## 2018-08-08 RX ADMIN — HEPARIN SODIUM 5000 UNITS: 5000 INJECTION INTRAVENOUS; SUBCUTANEOUS at 21:55

## 2018-08-08 RX ADMIN — VANCOMYCIN HYDROCHLORIDE 1500 MG: 10 INJECTION, POWDER, LYOPHILIZED, FOR SOLUTION INTRAVENOUS at 02:38

## 2018-08-08 RX ADMIN — OMEPRAZOLE 20 MG: 20 CAPSULE, DELAYED RELEASE ORAL at 08:58

## 2018-08-08 RX ADMIN — FUROSEMIDE 40 MG: 10 INJECTION, SOLUTION INTRAMUSCULAR; INTRAVENOUS at 16:54

## 2018-08-08 RX ADMIN — Medication 1 CAPSULE: at 08:58

## 2018-08-08 RX ADMIN — GABAPENTIN 100 MG: 100 CAPSULE ORAL at 22:34

## 2018-08-08 RX ADMIN — WATER 1 G: 1 INJECTION INTRAMUSCULAR; INTRAVENOUS; SUBCUTANEOUS at 02:37

## 2018-08-08 RX ADMIN — POLYETHYLENE GLYCOL 3350 17 G: 17 POWDER, FOR SOLUTION ORAL at 08:58

## 2018-08-08 RX ADMIN — GABAPENTIN 100 MG: 100 CAPSULE ORAL at 08:58

## 2018-08-08 RX ADMIN — INSULIN LISPRO 6 UNITS: 100 INJECTION, SOLUTION INTRAVENOUS; SUBCUTANEOUS at 22:34

## 2018-08-08 RX ADMIN — INSULIN LISPRO 3 UNITS: 100 INJECTION, SOLUTION INTRAVENOUS; SUBCUTANEOUS at 12:30

## 2018-08-08 RX ADMIN — TELMISARTAN 40 MG: 40 TABLET ORAL at 08:58

## 2018-08-08 RX ADMIN — GABAPENTIN 100 MG: 100 CAPSULE ORAL at 16:54

## 2018-08-08 RX ADMIN — HEPARIN SODIUM 5000 UNITS: 5000 INJECTION INTRAVENOUS; SUBCUTANEOUS at 05:04

## 2018-08-08 RX ADMIN — INSULIN GLARGINE 10 UNITS: 100 INJECTION, SOLUTION SUBCUTANEOUS at 22:34

## 2018-08-08 RX ADMIN — IPRATROPIUM BROMIDE AND ALBUTEROL SULFATE 3 ML: .5; 3 SOLUTION RESPIRATORY (INHALATION) at 00:05

## 2018-08-08 RX ADMIN — HYDROCODONE BITARTRATE AND ACETAMINOPHEN 1 TABLET: 10; 325 TABLET ORAL at 12:37

## 2018-08-08 NOTE — ROUTINE PROCESS
Bedside and Verbal shift change report given to JERRY Rose (oncoming nurse) by Heber Conklin RN  (offgoing nurse). Report included the following information SBAR, Kardex, Intake/Output, MAR and Recent Results.

## 2018-08-08 NOTE — PROGRESS NOTES
Hospitalist Progress Note    Patient: Osman Llanes MRN: [id number]  CSN: [id number]    YOB: 1940  Age: 70 y.o. Sex: female    DOA: 8/3/2018 LOS:  LOS: 3 days                Assessment/Plan     Patient Active Problem List   Diagnosis Code    Rhabdomyolysis M62.82    Lactic acidosis E87.2    Leg ulcer ([facility] [address] L97.909    Venous insufficiency I87.2    HTN (hypertension) I10    Hypercholesteremia E78.00    DM2 (diabetes mellitus, type 2) ([facility] [address] E11.9    Chronic ulcer of left lower extremity with fat layer exposed ([facility] [address] U00.404    Cellulitis L03.90            69 yo female admitted for cellulitis, rhabdo    Rhabdomyolysis - Fluids; holding diuretic. Continue to monitor electrolytes  Improving.       Cellulitis with leg ulcers - improving  Lactate has normalized  Continue broad spectrum abx with Rocephin and Vanc, add levaquin. Continue wound care        DM - start on lantus, continue sliding scale   holding Januvia and Glipizide       Chronic pain - Continue Norco prn    PT/OT    Disposition : 2-3 days    Review of systems  General: No fevers or chills. Cardiovascular: No chest pain or pressure. No palpitations. Pulmonary: No shortness of breath. Gastrointestinal: No nausea, vomiting. Physical Exam:  General: Awake, cooperative, no acute distress    HEENT: NC, Atraumatic. PERRLA, anicteric sclerae. Lungs: CTA Bilaterally. No Wheezing/Rhonchi/Rales. Heart:  Regular  rhythm,  No murmur, No Rubs, No Gallops  Abdomen: Soft, Non distended, Non tender.  +Bowel sounds,   Extremities: LE swelling bilaterally, bilateral Unna boots  Psych:   Not anxious or agitated. Neurologic:  No acute neurological deficit.            Vital signs/Intake and Output:  Visit Vitals    /79 (BP 1 Location: Left arm, BP Patient Position: At rest)    Pulse (!) 112    Temp (!) 102.1 °F (38.9 °C)    Resp 18    Ht 5' 7\" (1.702 m)    Wt 108.4 kg (239 lb)    SpO2 93%    BMI 37.43 kg/m2     Current Shift:     Last three shifts:  08/06 0701 - 08/07 1900  In: 720 [P.O.:360; I.V.:510]  Out: 0514 [Urine:2350; Drains:125]            Labs: Results:       Chemistry Recent Labs      08/07/18   0412  08/06/18   0534  08/05/18   0507   GLU  141*  212*  152*   NA  139  138  135*   K  3.7  3.9  4.1   CL  105  106  104   CO2  28  26  27   BUN  7  11  13   CREA  0.63  0.83  0.71   CA  9.2  9.1  8.7   AGAP  6  6  4   BUCR  11*  13  18      CBC w/Diff Recent Labs      08/05/18   0507   WBC  9.3   RBC  3.80*   HGB  10.0*   HCT  31.9*   PLT  181   GRANS  75*   LYMPH  16*   EOS  2      Cardiac Enzymes Recent Labs      08/06/18   0534  08/05/18   0507   CPK  455*  936*      Coagulation No results for input(s): PTP, INR, APTT in the last 72 hours. No lab exists for component: INREXT, INREXT    Lipid Panel No results found for: CHOL, CHOLPOCT, CHOLX, CHLST, CHOLV, 624980, HDL, LDL, LDLC, DLDLP, 473064, VLDLC, VLDL, TGLX, TRIGL, TRIGP, TGLPOCT, CHHD, CHHDX   BNP No results for input(s): BNPP in the last 72 hours. Liver Enzymes No results for input(s): TP, ALB, TBIL, AP, SGOT, GPT in the last 72 hours.     No lab exists for component: DBIL   Thyroid Studies No results found for: T4, T3U, TSH, TSHEXT, TSHEXT     Procedures/imaging: see electronic medical records for all procedures/Xrays and details which were not copied into this note but were reviewed prior to creation of Plan

## 2018-08-08 NOTE — PROGRESS NOTES
Pharmacy Dosing Services: Vancomycin    Consult for Vancomycin Dosing by Pharmacy by Dr. Ulysses Hausen provided for this 70y.o. year old female , for indication of Skin and Soft Tissue Infection (7 days). Day of Therapy 5    Ht Readings from Last 1 Encounters:   08/03/18 170.2 cm (67\")        Wt Readings from Last 1 Encounters:   08/06/18 108.4 kg (239 lb)      Previous Regimen Vancomycin 1500 mg IV every 24 hours    Last Level 2.9 mcg/ml at 0024 8/8/18   Other Current Antibiotics Levaquin + Ceftriaxone    Significant Cultures NA   Serum Creatinine Lab Results   Component Value Date/Time    Creatinine 0.65 08/08/2018 12:24 AM      Creatinine Clearance Estimated Creatinine Clearance: 93.3 mL/min (based on Cr of 0.65). BUN Lab Results   Component Value Date/Time    BUN 6 (L) 08/08/2018 12:24 AM      WBC Lab Results   Component Value Date/Time    WBC 13.2 08/08/2018 12:24 AM      H/H Lab Results   Component Value Date/Time    HGB 8.9 (L) 08/08/2018 12:24 AM      Platelets Lab Results   Component Value Date/Time    PLATELET 475 79/20/6998 12:24 AM      Temp (!) 100.6 °F (38.1 °C)       Increase Vancomycin maintenance dose to 1500 mg at 0024 8/8/18, every 12 hours. Dose calculated to approximate a therapeutic trough of 10- 15 mcg/mL. Pharmacy to follow daily and will make changes to dose and/or frequency based on clinical status.     Pharmacist Dee Dee Hughes 97

## 2018-08-08 NOTE — PROGRESS NOTES
PATIENT REQUESTED PRN NEB TX FOR WHEEZING. SPO2 93-95% ON 2L NC.  AUDIBLE UPPER AIRWAY WHEEZING PRESENT PRE AND POST TX.  BS DIMINISHED BILATERALLY.

## 2018-08-08 NOTE — PROGRESS NOTES
Problem: Self Care Deficits Care Plan (Adult)  Goal: *Acute Goals and Plan of Care (Insert Text)  Initial Occupational Therapy Goals (8/8/2018) Within 7 day(s):    1. Patient will perform grooming in supported sitting with setup x 15 minutes for increased independence with ADLs. 2. Patient will perform UB dressing with setup for increased independence with ADLs. 3. Patient will perform LB dressing with moderate assist & A/E PRN for increased independence with ADLs. 4. Patient will perform all aspects of toileting with moderate assist for increased independence in ADLs  5. Patient will independently apply energy conservation techniques with 1 verbal cue(s) for increased independence with ADLs. 6. Patient will utilize good body mechanics during ADLs with 1 verbal cue(s). 7. Patient will perform functional transfer for toileting with moderate assist.    Outcome: Progressing Towards Goal  Occupational Therapy EVALUATION    Patient: Jenny Leija (83 y.o. female)  Date: 8/8/2018  Primary Diagnosis: Rhabdomyolysis  Lactic acidosis  Leg ulcer ([facility] [address]        Precautions: BLE chronic wounds w/ ACE & LLE Wound VAC,  Fall, Skin    ASSESSMENT :  Based on the objective data described below, the patient presents with decreased functional strength, decreased functional balance, decreased overall activity tolerance limiting independence with ADLs. Patient w/ bed reverse Trendelenburg with pt reporting awaiting meal. Pt declining EOB d/t Dr. Ambika Garcia telling pt not to allow feet to dangle. Educated pt on time of BLE's in dependent position and importance of mobility and function. Placed bed at neutral w/ feet still elevated on pillows for safety in simple grooming and self-feeding task. Pt requiring assist and increased time to complete. Pt would benefit from continued OT services to maximize independence in ADLs.     Education: Patient instructed on home safety, body mechanics for optimal respiratory effort, Energy Conservation/Work Simplification Techniques, importance of movement for wound healing, ankle pumps during dangled position to assist in edema control, adaptive strategies and adaptive dressing techniques including clothing modifications with patient verbalizing understanding at this time. Patient will benefit from skilled intervention to address the above impairments. Patients rehabilitation potential is considered to be Fair  Factors which may influence rehabilitation potential include:   []             None noted  [x]             Mental ability/status  [x]             Medical condition  []             Home/family situation and support systems  []             Safety awareness  [x]             Pain tolerance/management  []             Other:      PLAN :  Recommendations and Planned Interventions:  [x]               Self Care Training                  [x]        Therapeutic Activities  [x]               Functional Mobility Training    [x]        Cognitive Retraining  [x]               Therapeutic Exercises           [x]        Endurance Activities  [x]               Balance Training                   [x]        Neuromuscular Re-Education  [x]               Visual/Perceptual Training     [x]   Home Safety Training  [x]               Patient Education                 [x]        Family Training/Education  []               Other (comment):    Frequency/Duration: Patient will be followed by occupational therapy 1-2 times per day/3-5 days per week to address goals. Discharge Recommendations: Rehab  Further Equipment Recommendations for Discharge: To Be Determined (TBD) at next level of care      SUBJECTIVE:   Patient stated I'm not supposed to have my feet down.     OBJECTIVE DATA SUMMARY:     Past Medical History:   Diagnosis Date    Cellulitis     Chronic ulcer of left lower extremity with fat layer exposed ([facility] [address]     DM2 (diabetes mellitus, type 2) ([facility] [address]     GERD (gastroesophageal reflux disease)     HTN (hypertension)     Hypercholesteremia     Venous insufficiency    History reviewed. No pertinent surgical history. Barriers to Learning/Limitations: yes;  cognitive and physical  Compensate with: visual, verbal, tactile, kinesthetic cues/model    Prior Level of Function/Home Situation: pt was living alone and driving   Home Situation  Home Environment: Private residence  # Steps to Enter: 0  One/Two Story Residence: One story  Living Alone: Yes  Support Systems: Child(greta)  Patient Expects to be Discharged to[de-identified] Skilled nursing facility  Current DME Used/Available at Home: [place], straight, [place], quad, Adaptive dressing aides, Raised toilet seat, Shower chair, Walker, rollator (reacher; 3WW)  Tub or Shower Type: Shower (and tub/showr combo)  [x]  Right hand dominant   []  Left hand dominant    Cognitive/Behavioral Status:  Neurologic State: Alert; Appropriate for age  Orientation Level: Oriented X4  Cognition: Follows commands;Decreased attention/concentration  Safety/Judgement: Decreased insight into deficits    Skin: BLE chronic venous stasis ulcers with LLE wound VAC  Edema: BLE edema in ACE wraps, elevated on pillows    Vision/Perceptual:      appears WFL     Coordination:  Coordination: Within functional limits  Fine Motor Skills-Upper: Left Intact; Right Intact    Gross Motor Skills-Upper: Left Intact; Right Intact    Balance:  Sitting: Impaired    Strength:  Strength: Generally decreased, functional  Tone & Sensation:  Tone: Normal  Sensation: Intact  Range of Motion:  AROM: Generally decreased, functional  PROM: Generally decreased, functional    Functional Mobility and Transfers for ADLs:  Bed Mobility:   deferred d/t pt fearful of dangling d/t Dr. Beau Dockery    ADL Assessment:   Feeding: Contact guard assistance;Minimum assistance    Oral Facial Hygiene/Grooming: Minimum assistance    Bathing: Maximum assistance    Upper Body Dressing: Maximum assistance    Lower Body Dressing: Total assistance    Toileting:  Total assistance    ADL Intervention:  Feeding  Feeding Assistance: Contact guard assistance;Minimum assistance  Container Management: Minimum assistance; Moderate assistance  Cutting Food: Contact guard assistance;Minimum assistance  Utensil Management: Stand-by assistance  Food to Mouth: Contact guard assistance  Drink to Mouth: Contact guard assistance    Grooming  Washing Hands: Supervision/set-up (with wipes)    Lower Body Dressing Assistance  Socks: Total assistance (dependent)  Position Performed: Long sitting on bed    Cognitive Retraining  Problem Solving: Inductive reason; Identifying the task; Identifying the problem;General alternative solution;Deductive reason  Executive Functions: Executing cognitive plans;Managing time  Organizing/Sequencing: Breaking task down;Prioritizing  Attention to Task: Distractibility; Single task  Following Commands: Awareness of environment; Follows one step commands/directions  Safety/Judgement: Decreased insight into deficits  Cues: Tactile cues provided;Verbal cues provided;Visual cues provided    Therapeutic Exercise:  Educated on importance of BUE strengthening to support BLE pain and edema    Pain:  Pre-treatment: Pt reports no pain w/o movement >5/10 w/ movement  Post-treatment: 5/10    Activity Tolerance:   Patient able to complete ADLs with frequent rest breaks. Patient limited by strength/pain/functional endurance. Patient unsteady     Please refer to the flowsheet for vital signs taken during this treatment. After treatment:   [] Patient left in no apparent distress sitting up in chair  [x] Patient left in no apparent distress in bed  [x] Call bell left within reach  [] Nursing notified  [] Caregiver present  [] Bed alarm activated    COMMUNICATION/EDUCATION:   [x] Home safety education was provided and the patient/caregiver indicated understanding. [x] Patient/family have participated as able in goal setting and plan of care.   [x] Patient/family agree to work toward stated goals and plan of care. [] Patient understands intent and goals of therapy, but is neutral about his/her participation. [] Patient is unable to participate in goal setting and plan of care. Thank you for this referral.  Alberta Peña, OTR/L  Time Calculation: 39 mins    G-Codes (GP)  Self Care   Current  CL= 60-79%   Goal  CK= 40-59%  The severity rating is based on the professional judgement & direct observation of Level of Assistance required for Functional Mobility and ADLs. Eval Complexity: History: HIGH Complexity : Extensive review of history including physical, cognitive and psychosocial history ; Examination: HIGH Complexity : 5 or more performance deficits relating to physical, cognitive , or psychosocial skils that result in activity limitations and / or participation restrictions; Decision Making:HIGH Complexity : Patient presents with comorbidities that affect occupational performance.  Signifigant modification of tasks or assistance (eg, physical or verbal) with assessment (s) is necessary to enable patient to complete evaluation

## 2018-08-08 NOTE — PROGRESS NOTES
Dr. Balbina Leigh requesting wound cultures when wound vac is changed. Spoke with Concepcion Allison from wound care and she said she will do the cultures when she changes the wound vac, either today or Thursday. 1940: Bedside and Verbal shift change report given to DC Morrell (oncoming nurse) by Emelyn Sullivan RN (offgoing nurse). Report included the following information SBAR, Kardex, Intake/Output and MAR.

## 2018-08-08 NOTE — PROGRESS NOTES
D/C Plan: Inpt Rehab 8/10/18    Reviewed therapy recommendations. CM met with pt to discuss transition of care. Pt is agreeable to recommendations. CM provided pt with a list of inpt rehab facilities and offered [facility]. Pt would like to have clinical sent to [facility], and [facility] (in that order). CMS has been notified to assit. Pt is pending wound cultures. CM continuing to follow. Care Management Interventions  PCP Verified by CM:  Yes  Mode of Transport at Discharge: BLS  Transition of Care Consult (CM Consult): SNF  [address]: No  Reason Outside [place]: Patient already serviced by other home care/hospice agency (current with [facility])  Health Maintenance Reviewed: Yes  Physical Therapy Consult: Yes  Occupational Therapy Consult: Yes  Current Support Network: Lives Alone, Family Lives Nearby  Confirm Follow Up Transport: Family  Plan discussed with Pt/Family/Caregiver: Yes  Freedom of Choice Offered: Yes  Discharge Location  Discharge Placement: Skilled nursing facility

## 2018-08-08 NOTE — PROGRESS NOTES
Problem: Falls - Risk of  Goal: *Absence of Falls  Document Carlos A Fall Risk and appropriate interventions in the flowsheet.    Outcome: Progressing Towards Goal  Fall Risk Interventions:  Mobility Interventions: PT Consult for mobility concerns         Medication Interventions: Evaluate medications/consider consulting pharmacy    Elimination Interventions: Call light in reach, Patient to call for help with toileting needs    History of Falls Interventions: Evaluate medications/consider consulting pharmacy

## 2018-08-08 NOTE — PROGRESS NOTES
Problem: Mobility Impaired (Adult and Pediatric)  Goal: *Acute Goals and Plan of Care (Insert Text)  Physical TherapyGoals   Initiated 8/7/2018 to be met within 7 days  1. Bed mobility: Rolling L to R to with mod A with use of HR for positioning. 2. Transfer: Supine to/from Sit with mod A with HR for change of position/prep for ambulation. 3. Activity Tolerance: Tolerate EOB sitting 5-10 minutes for ADL/balance activities. 4. Transfer: Sit to/from Stand with mod assist/RW in prep for ambulation. 4. Ambulation:  Ambulate 5-20 ft. max/mod A with RW for increased functional mobility. Outcome: Progressing Towards Goal  physical Therapy TREATMENT    Patient: Gregor Lisa (89 y.o. female)  Date: 8/8/2018  Diagnosis: Rhabdomyolysis  Lactic acidosis  Leg ulcer (HCC) Rhabdomyolysis  Precautions: Fall, Skin   Chart, physical therapy assessment, plan of care and goals were reviewed. ASSESSMENT:  Pt supine in bed on PT arrival with wound vac in place and appears to have completed self assisted upper body bathing. Clean gown donned with min assist of PT. Pt seen by IDR team while supine. Educated by Dr. Jesusita Brown on importance of maintaining elevated position of LE's when supine. Pt requires max/total assist for bed mobility skills. Is unable to mobilize LE's without assistance and extensive time for movement due to pain BLE's. Poor initial sitting balance, however improved following FB'g and target reach therapeutic exercise. Fair tolerance for LE ex as noted below-minimal active movements noted. Pt returned to supine total assist and left with bed in Trendelenburg position for LE elevation. All needs in reach and wound vac remains in place, as well as Audrey Saini.     Progression toward goals:  []      Improving appropriately and progressing toward goals  [x]      Improving slowly and progressing toward goals  []      Not making progress toward goals and plan of care will be adjusted     PLAN:  Patient continues to benefit from skilled intervention to address the above impairments. Continue treatment per established plan of care. Discharge Recommendations:  Skilled Nursing Facility  Further Equipment Recommendations for Discharge:  N/A     SUBJECTIVE:   Patient stated I feel a little stronger.     OBJECTIVE DATA SUMMARY:   Critical Behavior:  Neurologic State: Alert, Appropriate for age  Orientation Level: Oriented X4  Cognition: Follows commands, Decreased attention/concentration  Safety/Judgement: Decreased insight into deficits  Functional Mobility Training:  Bed Mobility:  Rolling: Maximum assistance  Supine to Sit: Maximum assistance; Total assistance  Sit to Supine: Maximum assistance; Total assistance  Scooting: Total assistance;Assist x2  Balance:  Sitting: Impaired  Sitting - Static: Fair (occasional)  Sitting - Dynamic: Fair (occasional)  Therapeutic Exercises:   Seated: balance activities as noted above; LE's toe curls, toe tapping, hip marches all x 10  Pain:  Pain Scale 1: Numeric (0 - 10)  Pain Intensity 1: 7  Pain Location 1: Leg  Pain Orientation 1: Left  Pain Description 1: Aching;Burning  Pain Intervention(s) 1: Medication (see MAR)  Activity Tolerance:   Fair   Please refer to the flowsheet for vital signs taken during this treatment.   After treatment:   [] Patient left in no apparent distress sitting up in chair  [x] Patient left in no apparent distress in bed  [x] Call bell left within reach  [x] Nursing notified  [] Caregiver present  [] Bed alarm activated      Lola Hansen PT   Time Calculation: 40 mins

## 2018-08-08 NOTE — PROGRESS NOTES
Shift summary: Assumed care of patient. Fever improved; encouraged use of I.S. Rates pain 10/10 in LLE; medicated with two Norco tabs. Scattered wheezing heard in lower lobes; RT in to administer duo neb treatment. Oxygen saturation @ 98% on 2L/min of oxygen. LLE to wound vac draining serosanguinous. Dressings to BLE CDI. BLE edematous; LLE pitting 2+ with legs elevated on pillows. Purewick in place and draining vignesh urine.      Patient Vitals for the past 12 hrs:   Temp Pulse Resp BP SpO2   08/08/18 0247 98.4 °F (36.9 °C) 89 20 118/57 99 %   08/08/18 0002 - (!) 105 - - 98 %   08/07/18 2211 (!) 100.6 °F (38.1 °C) (!) 108 18 143/62 91 %   08/07/18 2001 (!) 102.1 °F (38.9 °C) (!) 112 18 152/79 93 %   08/07/18 1834 - (!) 114 - - 97 %   08/07/18 1703 (!) 100.8 °F (38.2 °C) (!) 116 18 165/62 96 %

## 2018-08-08 NOTE — PROGRESS NOTES
1940: Bedside and Verbal shift change report given to CLEVELAND Freeman (oncoming nurse) by Martín Hooks RN (offgoing nurse). Report included the following information SBAR, Kardex, Intake/Output and MAR.

## 2018-08-08 NOTE — PROGRESS NOTES
1938 Assumed care of patient from Andry Cade RN. Shift assessment initiated. Patient in bed, HOB elevated. Dinner at bedside, no assistance needed. Pain voiced at /10, denies the need for pain medication at this time. All questions and concerns answered. All needs met. Bed in the lowest position. Call bell/personal items within reach. No needs at this time. Will monitor for changes. 0630 Wheezing noted. Respiratory paged for Duo-Neb PRN.     0800 Bedside and Verbal shift change report given to Andrew Gonzalez RN (oncoming nurse) by DC Negrete RN (offgoing nurse). Report included the following information SBAR, Kardex, MAR, Recent Results and Med Rec Status.

## 2018-08-09 LAB
ALBUMIN SERPL-MCNC: 1.5 G/DL (ref 3.4–5)
ALBUMIN/GLOB SERPL: 0.3 {RATIO} (ref 0.8–1.7)
ALP SERPL-CCNC: 83 U/L (ref 45–117)
ALT SERPL-CCNC: 29 U/L (ref 13–56)
ANION GAP SERPL CALC-SCNC: 4 MMOL/L (ref 3–18)
AST SERPL-CCNC: 22 U/L (ref 15–37)
BASOPHILS # BLD: 0 K/UL (ref 0–0.1)
BASOPHILS NFR BLD: 0 % (ref 0–3)
BILIRUB SERPL-MCNC: 0.3 MG/DL (ref 0.2–1)
BUN SERPL-MCNC: 6 MG/DL (ref 7–18)
BUN/CREAT SERPL: 8 (ref 12–20)
CALCIUM SERPL-MCNC: 9 MG/DL (ref 8.5–10.1)
CHLORIDE SERPL-SCNC: 103 MMOL/L (ref 100–108)
CO2 SERPL-SCNC: 32 MMOL/L (ref 21–32)
CREAT SERPL-MCNC: 0.72 MG/DL (ref 0.6–1.3)
DIFFERENTIAL METHOD BLD: ABNORMAL
EOSINOPHIL # BLD: 0.7 K/UL (ref 0–0.4)
EOSINOPHIL NFR BLD: 5 % (ref 0–5)
ERYTHROCYTE [DISTWIDTH] IN BLOOD BY AUTOMATED COUNT: 16.1 % (ref 11.6–14.5)
GLOBULIN SER CALC-MCNC: 5.3 G/DL (ref 2–4)
GLUCOSE BLD STRIP.AUTO-MCNC: 138 MG/DL (ref 70–110)
GLUCOSE BLD STRIP.AUTO-MCNC: 145 MG/DL (ref 70–110)
GLUCOSE BLD STRIP.AUTO-MCNC: 182 MG/DL (ref 70–110)
GLUCOSE BLD STRIP.AUTO-MCNC: 260 MG/DL (ref 70–110)
GLUCOSE SERPL-MCNC: 152 MG/DL (ref 74–99)
HCT VFR BLD AUTO: 27.8 % (ref 35–45)
HGB BLD-MCNC: 9 G/DL (ref 12–16)
LYMPHOCYTES # BLD: 1.8 K/UL (ref 0.8–3.5)
LYMPHOCYTES NFR BLD: 13 % (ref 20–51)
MCH RBC QN AUTO: 26.6 PG (ref 24–34)
MCHC RBC AUTO-ENTMCNC: 32.4 G/DL (ref 31–37)
MCV RBC AUTO: 82.2 FL (ref 74–97)
MONOCYTES # BLD: 1 K/UL (ref 0–1)
MONOCYTES NFR BLD: 7 % (ref 2–9)
NEUTS SEG # BLD: 10.2 K/UL (ref 1.8–8)
NEUTS SEG NFR BLD: 75 % (ref 42–75)
PLATELET # BLD AUTO: 269 K/UL (ref 135–420)
PLATELET COMMENTS,PCOM: ABNORMAL
PMV BLD AUTO: 9.6 FL (ref 9.2–11.8)
POTASSIUM SERPL-SCNC: 3.4 MMOL/L (ref 3.5–5.5)
PROT SERPL-MCNC: 6.8 G/DL (ref 6.4–8.2)
RBC # BLD AUTO: 3.38 M/UL (ref 4.2–5.3)
RBC MORPH BLD: ABNORMAL
RBC MORPH BLD: ABNORMAL
SODIUM SERPL-SCNC: 139 MMOL/L (ref 136–145)
WBC # BLD AUTO: 13.7 K/UL (ref 4.6–13.2)
WBC MORPH BLD: ABNORMAL

## 2018-08-09 PROCEDURE — 77010033678 HC OXYGEN DAILY

## 2018-08-09 PROCEDURE — 87186 SC STD MICRODIL/AGAR DIL: CPT | Performed by: HOSPITALIST

## 2018-08-09 PROCEDURE — 97606 NEG PRS WND THER DME>50 SQCM: CPT

## 2018-08-09 PROCEDURE — 87070 CULTURE OTHR SPECIMN AEROBIC: CPT | Performed by: HOSPITALIST

## 2018-08-09 PROCEDURE — 77030019952 HC CANSTR VAC ASST KCON -B

## 2018-08-09 PROCEDURE — 74011250637 HC RX REV CODE- 250/637: Performed by: INTERNAL MEDICINE

## 2018-08-09 PROCEDURE — 74011250636 HC RX REV CODE- 250/636: Performed by: INTERNAL MEDICINE

## 2018-08-09 PROCEDURE — 65270000029 HC RM PRIVATE

## 2018-08-09 PROCEDURE — 77030019934 HC DRSG VAC ASST KCON -B

## 2018-08-09 PROCEDURE — 97530 THERAPEUTIC ACTIVITIES: CPT

## 2018-08-09 PROCEDURE — 74011636637 HC RX REV CODE- 636/637: Performed by: HOSPITALIST

## 2018-08-09 PROCEDURE — 85025 COMPLETE CBC W/AUTO DIFF WBC: CPT | Performed by: INTERNAL MEDICINE

## 2018-08-09 PROCEDURE — 74011250637 HC RX REV CODE- 250/637: Performed by: HOSPITALIST

## 2018-08-09 PROCEDURE — 74011250636 HC RX REV CODE- 250/636: Performed by: HOSPITALIST

## 2018-08-09 PROCEDURE — 94640 AIRWAY INHALATION TREATMENT: CPT

## 2018-08-09 PROCEDURE — 74011000250 HC RX REV CODE- 250: Performed by: INTERNAL MEDICINE

## 2018-08-09 PROCEDURE — 74011636637 HC RX REV CODE- 636/637: Performed by: INTERNAL MEDICINE

## 2018-08-09 PROCEDURE — 93005 ELECTROCARDIOGRAM TRACING: CPT

## 2018-08-09 PROCEDURE — 82962 GLUCOSE BLOOD TEST: CPT

## 2018-08-09 PROCEDURE — A4216 STERILE WATER/SALINE, 10 ML: HCPCS | Performed by: INTERNAL MEDICINE

## 2018-08-09 PROCEDURE — 36415 COLL VENOUS BLD VENIPUNCTURE: CPT | Performed by: INTERNAL MEDICINE

## 2018-08-09 PROCEDURE — 87077 CULTURE AEROBIC IDENTIFY: CPT | Performed by: HOSPITALIST

## 2018-08-09 PROCEDURE — 80053 COMPREHEN METABOLIC PANEL: CPT | Performed by: INTERNAL MEDICINE

## 2018-08-09 RX ORDER — HYDROCHLOROTHIAZIDE 25 MG/1
25 TABLET ORAL DAILY
Status: DISCONTINUED | OUTPATIENT
Start: 2018-08-09 | End: 2018-08-11

## 2018-08-09 RX ADMIN — HYDROCODONE BITARTRATE AND ACETAMINOPHEN 1 TABLET: 10; 325 TABLET ORAL at 16:07

## 2018-08-09 RX ADMIN — TELMISARTAN 40 MG: 40 TABLET ORAL at 08:44

## 2018-08-09 RX ADMIN — TROSPIUM CHLORIDE 60 MG: 20 TABLET, FILM COATED ORAL at 08:45

## 2018-08-09 RX ADMIN — HYDROCODONE BITARTRATE AND ACETAMINOPHEN 1 TABLET: 10; 325 TABLET ORAL at 21:29

## 2018-08-09 RX ADMIN — INSULIN LISPRO 3 UNITS: 100 INJECTION, SOLUTION INTRAVENOUS; SUBCUTANEOUS at 21:28

## 2018-08-09 RX ADMIN — MORPHINE SULFATE 2 MG: 2 INJECTION, SOLUTION INTRAMUSCULAR; INTRAVENOUS at 14:07

## 2018-08-09 RX ADMIN — VANCOMYCIN HYDROCHLORIDE 1500 MG: 10 INJECTION, POWDER, LYOPHILIZED, FOR SOLUTION INTRAVENOUS at 13:18

## 2018-08-09 RX ADMIN — GABAPENTIN 100 MG: 100 CAPSULE ORAL at 16:07

## 2018-08-09 RX ADMIN — ACETAMINOPHEN 650 MG: 650 SUPPOSITORY RECTAL at 18:29

## 2018-08-09 RX ADMIN — Medication 1 CAPSULE: at 08:44

## 2018-08-09 RX ADMIN — MORPHINE SULFATE 2 MG: 2 INJECTION, SOLUTION INTRAMUSCULAR; INTRAVENOUS at 09:55

## 2018-08-09 RX ADMIN — OMEPRAZOLE 20 MG: 20 CAPSULE, DELAYED RELEASE ORAL at 08:44

## 2018-08-09 RX ADMIN — INSULIN GLARGINE 10 UNITS: 100 INJECTION, SOLUTION SUBCUTANEOUS at 21:29

## 2018-08-09 RX ADMIN — HYDROCODONE BITARTRATE AND ACETAMINOPHEN 1 TABLET: 10; 325 TABLET ORAL at 08:50

## 2018-08-09 RX ADMIN — VANCOMYCIN HYDROCHLORIDE 1500 MG: 10 INJECTION, POWDER, LYOPHILIZED, FOR SOLUTION INTRAVENOUS at 03:03

## 2018-08-09 RX ADMIN — GABAPENTIN 100 MG: 100 CAPSULE ORAL at 08:44

## 2018-08-09 RX ADMIN — LEVOFLOXACIN 750 MG: 5 INJECTION, SOLUTION INTRAVENOUS at 21:28

## 2018-08-09 RX ADMIN — HEPARIN SODIUM 5000 UNITS: 5000 INJECTION INTRAVENOUS; SUBCUTANEOUS at 05:44

## 2018-08-09 RX ADMIN — IPRATROPIUM BROMIDE AND ALBUTEROL SULFATE 3 ML: .5; 3 SOLUTION RESPIRATORY (INHALATION) at 06:40

## 2018-08-09 RX ADMIN — GABAPENTIN 100 MG: 100 CAPSULE ORAL at 21:29

## 2018-08-09 RX ADMIN — MULTIPLE VITAMINS W/ MINERALS TAB 1 TABLET: TAB at 08:44

## 2018-08-09 RX ADMIN — HEPARIN SODIUM 5000 UNITS: 5000 INJECTION INTRAVENOUS; SUBCUTANEOUS at 21:30

## 2018-08-09 RX ADMIN — FUROSEMIDE 40 MG: 10 INJECTION, SOLUTION INTRAMUSCULAR; INTRAVENOUS at 08:44

## 2018-08-09 RX ADMIN — HYDROCHLOROTHIAZIDE 25 MG: 25 TABLET ORAL at 16:08

## 2018-08-09 RX ADMIN — WATER 1 G: 1 INJECTION INTRAMUSCULAR; INTRAVENOUS; SUBCUTANEOUS at 03:03

## 2018-08-09 RX ADMIN — HEPARIN SODIUM 5000 UNITS: 5000 INJECTION INTRAVENOUS; SUBCUTANEOUS at 12:09

## 2018-08-09 RX ADMIN — INSULIN LISPRO 9 UNITS: 100 INJECTION, SOLUTION INTRAVENOUS; SUBCUTANEOUS at 12:09

## 2018-08-09 RX ADMIN — POLYETHYLENE GLYCOL 3350 17 G: 17 POWDER, FOR SOLUTION ORAL at 08:45

## 2018-08-09 NOTE — PROGRESS NOTES
Problem: Pressure Injury - Risk of  Goal: *Prevention of pressure injury  Document Juan Carlos Scale and appropriate interventions in the flowsheet. Outcome: Progressing Towards Goal  Pressure Injury Interventions:  Sensory Interventions: Assess changes in LOC    Moisture Interventions: Absorbent underpads, Check for incontinence Q2 hours and as needed    Activity Interventions: Pressure redistribution bed/mattress(bed type), PT/OT evaluation    Mobility Interventions: HOB 30 degrees or less, Pressure redistribution bed/mattress (bed type), PT/OT evaluation    Nutrition Interventions: Document food/fluid/supplement intake    Friction and Shear Interventions: HOB 30 degrees or less, Feet elevated on foot rest, Apply protective barrier, creams and emollients               Problem: Falls - Risk of  Goal: *Absence of Falls  Document Carlos A Fall Risk and appropriate interventions in the flowsheet.    Outcome: Progressing Towards Goal  Fall Risk Interventions:  Mobility Interventions: PT Consult for mobility concerns, PT Consult for assist device competence, Communicate number of staff needed for ambulation/transfer         Medication Interventions: Assess postural VS orthostatic hypotension, Patient to call before getting OOB, Teach patient to arise slowly    Elimination Interventions: Call light in reach, Patient to call for help with toileting needs    History of Falls Interventions: Door open when patient unattended

## 2018-08-09 NOTE — PROGRESS NOTES
6128 Assumed care of pt at this time. Pt in bed with no signs of distress. Pt left with call light within reach and encouraged to call for assistance. 0850 Head to toe assessment completed at this time  Patient is A&OX4. Pt denies N/V chest pain and SOB or distress. Pt is calm and cooperative. Pedal pulses are present. Capillary refill less than 3 seconds. Skin in warm and dry with ACE wrap dressing on both Leg and is CDI. Wound vacuum present on Lt leg and is draining. Lungs are clear bilaterally. Patient instructed on use and reason for incentive spirometer. Bowel sounds are active. Abdomen is soft and non-tender. 22 g needle in the Rt hand. Pain scale explained to patient. Reasons for taking PRN meds explained to patient. Patient instructed to call for prn when needed. Pain level is 9, medicated as per MAR. Patient was oriented to call bell and bed function. Will continue to monitor    3195 Spoke with dr Catracho Herbert regarding pt got last dose of norco 1 hr ago and is that ok if pt can have morphine now before dressing change and he is ok with that    0955 Pt state pain as 10/10. Pt received medication as per MAR. Potential medication side effects explained to patient, patient verbalizes understanding, opportunities for questions provided. Patient stable, no apparent distress at this time, bed in locked position, call bell within reach.     1010 dr Catracho Herbert gave verbal order with read back for Wound culture

## 2018-08-09 NOTE — ROUTINE PROCESS
Bedside and Verbal shift change report given to Jaya Hernandez (oncoming nurse) by Aaron Patten (offgoing nurse). Report included the following information SBAR, Kardex, Intake/Output and MAR.

## 2018-08-09 NOTE — ROUTINE PROCESS
Pt still c/o chest pain to right breast. Vs taken. States that the pain has gotten better since last voiced. Says its about a 4/10.

## 2018-08-09 NOTE — ROUTINE PROCESS
While pt working with PT, pt c/o chest pain to right breast. Dr. Goldberg Seats called and EKG ordered.

## 2018-08-09 NOTE — ROUTINE PROCESS
Bedside shift change report given to Pastora Ling (oncoming nurse) by Claudia BENNETT RN (offgoing nurse). Report included the following information SBAR, Kardex and MAR.

## 2018-08-09 NOTE — PROGRESS NOTES
In to check on patient and noted that IV infiltrated. Patient says that she thought it was swollen earlier. Reminded her that she was instructed to call if she had swelling, pain or redness at site so that we could re evaluate. Arm placed on pillows and instructed to call if further problems.

## 2018-08-09 NOTE — PROGRESS NOTES
Hospitalist Progress Note    Patient: Kandy Huang MRN: [id number]  CSN: [id number]    YOB: 1940  Age: 70 y.o. Sex: female    DOA: 8/3/2018 LOS:  LOS: 5 days                Assessment/Plan     Patient Active Problem List   Diagnosis Code    Rhabdomyolysis M62.82    Lactic acidosis E87.2    Leg ulcer ([facility] [address] L97.909    Venous insufficiency I87.2    HTN (hypertension) I10    Hypercholesteremia E78.00    DM2 (diabetes mellitus, type 2) ([facility] [address] E11.9    Chronic ulcer of left lower extremity with fat layer exposed ([facility] [address] H80.574    Cellulitis L03.90            71 yo female admitted for cellulitis, rhabdo    Rhabdomyolysis - resolved     Cellulitis with leg ulcers - improving  Lactate has normalized  Continue broad spectrum abx with Rocephin and Vanc, add levaquin. Continue wound care . Edema - restart on lasix, keep legs elevated     DM - start on lantus, continue sliding scale   holding Januvia and Glipizide       Chronic pain - Continue Norco prn    PT/OT        Disposition : 2-3 days    Review of systems  General: No fevers or chills. Cardiovascular: No chest pain or pressure. No palpitations. Pulmonary: No shortness of breath. Gastrointestinal: No nausea, vomiting. Physical Exam:  General: Awake, cooperative, no acute distress    HEENT: NC, Atraumatic. PERRLA, anicteric sclerae. Lungs: CTA Bilaterally. No Wheezing/Rhonchi/Rales. Heart:  Regular  rhythm,  No murmur, No Rubs, No Gallops  Abdomen: Soft, Non distended, Non tender.  +Bowel sounds,   Extremities: LE swelling bilaterally, bilateral Unna boots  Psych:   Not anxious or agitated. Neurologic:  No acute neurological deficit.            Vital signs/Intake and Output:  Visit Vitals    /72    Pulse (!) 104    Temp 98.6 °F (37 °C)    Resp 20    Ht 5' 7\" (1.702 m)    Wt 108.4 kg (239 lb)    SpO2 100%    BMI 37.43 kg/m2     Current Shift:  08/09 0701 - 08/09 1900  In: 240 [P.O.:240]  Out: 1001 [Urine:1001]  Last three shifts:  08/07 1901 - 08/09 0700  In: 990 [P.O.:840; I.V.:150]  Out: 3685 [Urine:3650; Drains:35]            Labs: Results:       Chemistry Recent Labs      08/09/18 0412 08/08/18 0024  08/07/18 0412   GLU  152*  152*  141*   NA  139  140  139   K  3.4*  3.3*  3.7   CL  103  105  105   CO2  32  28  28   BUN  6*  6*  7   CREA  0.72  0.65  0.63   CA  9.0  9.0  9.2   AGAP  4  7  6   BUCR  8*  9*  11*   AP  83  84   --    TP  6.8  6.4   --    ALB  1.5*  1.6*   --    GLOB  5.3*  4.8*   --    AGRAT  0.3*  0.3*   --       CBC w/Diff Recent Labs      08/09/18 0412 08/08/18 0024   WBC  13.7*  13.2   RBC  3.38*  3.38*   HGB  9.0*  8.9*   HCT  27.8*  27.8*   PLT  269  221   GRANS  75  77*   LYMPH  13*  14*   EOS  5  0      Cardiac Enzymes Recent Labs      08/08/18 0024   CPK  147      Coagulation No results for input(s): PTP, INR, APTT in the last 72 hours. No lab exists for component: INREXT, INREXT    Lipid Panel No results found for: CHOL, CHOLPOCT, CHOLX, CHLST, CHOLV, 906941, HDL, LDL, LDLC, DLDLP, 802711, VLDLC, VLDL, TGLX, TRIGL, TRIGP, TGLPOCT, CHHD, CHHDX   BNP No results for input(s): BNPP in the last 72 hours.    Liver Enzymes Recent Labs      08/09/18 0412   TP  6.8   ALB  1.5*   AP  83   SGOT  22      Thyroid Studies No results found for: T4, T3U, TSH, TSHEXT, TSHEXT     Procedures/imaging: see electronic medical records for all procedures/Xrays and details which were not copied into this note but were reviewed prior to creation of Plan

## 2018-08-09 NOTE — PROGRESS NOTES
Problem: Mobility Impaired (Adult and Pediatric)  Goal: *Acute Goals and Plan of Care (Insert Text)  Physical TherapyGoals   Initiated 8/7/2018 to be met within 7 days  1. Bed mobility: Rolling L to R to with mod A with use of HR for positioning. 2. Transfer: Supine to/from Sit with mod A with HR for change of position/prep for ambulation. 3. Activity Tolerance: Tolerate EOB sitting 5-10 minutes for ADL/balance activities. 4. Transfer: Sit to/from Stand with mod assist/RW in prep for ambulation. 4. Ambulation:  Ambulate 5-20 ft. max/mod A with RW for increased functional mobility. Outcome: Progressing Towards Goal  physical Therapy TREATMENT    Patient: Osman Llanes (87 y.o. female)  Date: 8/9/2018  Diagnosis: Rhabdomyolysis  Lactic acidosis  Leg ulcer (HCC) Rhabdomyolysis  Precautions: Fall, Skin   Chart, physical therapy assessment, plan of care and goals were reviewed. ASSESSMENT:  Pt continues to require significant physical assistance with bed mobility. No progress made at this time as pt reporting increase BLE pain with movement. Pt didn't get/or request pain medication noted at 1pm on white board in room. Once EOB static sitting balance is Poor, as pt posterior leaning and to the L. Focused on weight shifting anterior to midline. Pt then able to maintain sitting balance with CGA for safety. Pt then reporting increase pressure in chest. Quickly assisted pt back to supine Max/total A. Nursing notified right aware. Vital taken. BP noted to be 140/111. 2nd BP taken again and documented in doc flow. Cont POC. Progression toward goals:  []      Improving appropriately and progressing toward goals  [x]      Improving slowly and progressing toward goals  []      Not making progress toward goals and plan of care will be adjusted     PLAN:  Patient continues to benefit from skilled intervention to address the above impairments. Continue treatment per established plan of care.   Discharge Recommendations:  Rehab  Further Equipment Recommendations for Discharge:  hospital bed     SUBJECTIVE:   Patient stated  I was driving     OBJECTIVE DATA SUMMARY:   Critical Behavior:  Neurologic State: Alert, Appropriate for age  Orientation Level: Oriented X4  Cognition: Appropriate decision making, Appropriate for age attention/concentration, Appropriate safety awareness, Follows commands  Safety/Judgement: Decreased insight into deficits  Functional Mobility Training:  Bed Mobility:  Rolling: Maximum assistance  Supine to Sit: Maximum assistance; Total assistance  Sit to Supine: Maximum assistance; Total assistance  Scooting:  Total assistance;Assist x2  Balance:  Sitting: Impaired  Sitting - Static: Fair (occasional)  Sitting - Dynamic: Fair (occasional)    Pain:  Pain Scale 1: Numeric (0 - 10)  Pain Intensity 1: 6  Pain Location 1: Hand  Pain Orientation 1: Left  Pain Description 1: Aching  Pain Intervention(s) 1: Medication (see MAR)  Activity Tolerance:   Poor      After treatment:   [] Patient left in no apparent distress sitting up in chair  [x] Patient left in no apparent distress in bed  [x] Call bell left within reach  [] Nursing notified  [] Caregiver present  [] Bed alarm activated      Annita Bearden PTA   Time Calculation: 40 mins

## 2018-08-09 NOTE — PROGRESS NOTES
Chart reviewed. Per previous cm notes, pt has signed [facility] for rehab for [place], [place] and [employer]. Pt has been declined by [facility]. This CM spoke with Fabián Morejon @ [facility] and she stated she did not believe pt met criteria for admission, but will discuss with medical director and get back with CM. This CM spoke with Avelina Crooks @ [facility] and he stated they are reviewing referral.  CM will cont to follow.

## 2018-08-09 NOTE — WOUND CARE
Wound Care Progress Note       Follow-up visit for dressing changes to bilateral legs      Assessment:   Communication: Pt alert and oriented and communicative    Continent - Purewick  In place    Patient requires full assistance in repositioning self     Diet: as tolerated    Patient reports pain to legs 10/10, bedside RN called for pain medication      Bilateral heel, buttocks and sacrum skin intact and without erythema. Generalized edema and blanching erythema to lower legs and feet. 1. POA, left lateral lower leg wound = 11.7 x 11 x 0.4 cm    Base is 95% of pink/red tissue and 5% yellow slough. Large amount of tan/serosanginous exudate no odor present at this time. Periwound with slight maceration & small amount of erythema. Treatment: aquacel to periwound, drape periwound, black sponge, drape, wound vac set to 125 mmHg. Leg wrapped with soft roll and coban from mpj to  Popliteal space. Pt tolerated vac application without difficulty. 2. POA, right lower leg ulcers =Lateral- 1.5 x 2 x 0.3 cm; anterior- 2 x 3 x 0.3; medial- 0.5 x 0.5 x 0.1  Base is 90% of yellow slough tissue. Moderate amount of serosanginous exudate. Periwound intact & without erythema. Treatment: Kena, Aquacel and Mepilex border applied to wounds. Leg wrapped with soft roll and coban from mpj to popliteal space. Pt tolerated dressing change without difficulty. Heels offloaded on pillows     Wound Recommendations:   Continue wound vac therapy to left leg wound and dressing changes to right leg wounds followed by soft roll and coban. Skin Care & Pressure Relief Recommendations:  Minimize layers of linen/pads under patient to optimize support surface. Turn/reposition approximately every 2 hours and offload heels pillows or Prevalon boots.   Manage incontinence / promote continence; Proshield to buttocks and sacrum daily and as needed with incontinence care    Discussed above plan with patient & R.  Partha Proctor RN    Transition of Care: Plan to follow 2 times a week for vac dressing changes and per product recommendations while admitted to hospital.

## 2018-08-09 NOTE — ROUTINE PROCESS
Pt c/o chest pain to right breast. Pain not radiating to arms. No pain voiced about back. No sweating or N/V noted. Pt states that she think its indigestion. Pt given a ginger ale with lunch to help relieve the indigestion. States she uses ginger ale at home all the time to help her burp.

## 2018-08-09 NOTE — DIABETES MGMT
GLYCEMIC CONTROL PROGRESS NOTE:    -known h/o T2DM, HbA1C within recommended range for age + comorbids on oral home regimen  -BG out of target range non-ICU: < 140 mg/dL  -TDD = 25 (10 Lantus + 15 units - Humalog Very Insulin Resistant Corrective Coverage)  -PPG out of target range, recommend mealtime insulin   *Humalog 3 units qac  - Intake:   Patient Vitals for the past 100 hrs:   % Diet Eaten   08/08/18 1557 50 %   08/08/18 1003 100 %   08/07/18 2023 0 %   08/07/18 1014 100 %   08/06/18 1545 100 %   08/05/18 1824 50 %   08/05/18 1539 100 %   08/05/18 0909 100 %      Recent Glucose Results:   Lab Results   Component Value Date/Time     (H) 08/09/2018 04:12 AM    GLUCPOC 138 (H) 08/09/2018 07:36 AM    GLUCPOC 230 (H) 08/08/2018 10:28 PM    GLUCPOC 226 (H) 08/08/2018 04:38 PM     Werner Chan RN, MS  Glycemic Control Team  Pager [phone] (M-TH 8:30-5P)  *After Hours pager [phone]

## 2018-08-10 LAB
ALBUMIN SERPL-MCNC: 1.6 G/DL (ref 3.4–5)
ALBUMIN/GLOB SERPL: 0.3 {RATIO} (ref 0.8–1.7)
ALP SERPL-CCNC: 87 U/L (ref 45–117)
ALT SERPL-CCNC: 27 U/L (ref 13–56)
ANION GAP SERPL CALC-SCNC: 5 MMOL/L (ref 3–18)
AST SERPL-CCNC: 21 U/L (ref 15–37)
BACTERIA SPEC CULT: NORMAL
BACTERIA SPEC CULT: NORMAL
BASOPHILS # BLD: 0 K/UL (ref 0–0.1)
BASOPHILS NFR BLD: 0 % (ref 0–2)
BILIRUB SERPL-MCNC: 0.4 MG/DL (ref 0.2–1)
BUN SERPL-MCNC: 9 MG/DL (ref 7–18)
BUN/CREAT SERPL: 7 (ref 12–20)
CALCIUM SERPL-MCNC: 9.5 MG/DL (ref 8.5–10.1)
CHLORIDE SERPL-SCNC: 99 MMOL/L (ref 100–108)
CO2 SERPL-SCNC: 32 MMOL/L (ref 21–32)
CREAT SERPL-MCNC: 1.34 MG/DL (ref 0.6–1.3)
DIFFERENTIAL METHOD BLD: ABNORMAL
EOSINOPHIL # BLD: 0 K/UL (ref 0–0.4)
EOSINOPHIL NFR BLD: 0 % (ref 0–5)
ERYTHROCYTE [DISTWIDTH] IN BLOOD BY AUTOMATED COUNT: 15.9 % (ref 11.6–14.5)
GLOBULIN SER CALC-MCNC: 6 G/DL (ref 2–4)
GLUCOSE BLD STRIP.AUTO-MCNC: 150 MG/DL (ref 70–110)
GLUCOSE BLD STRIP.AUTO-MCNC: 162 MG/DL (ref 70–110)
GLUCOSE BLD STRIP.AUTO-MCNC: 183 MG/DL (ref 70–110)
GLUCOSE BLD STRIP.AUTO-MCNC: 192 MG/DL (ref 70–110)
GLUCOSE SERPL-MCNC: 188 MG/DL (ref 74–99)
HCT VFR BLD AUTO: 32.3 % (ref 35–45)
HGB BLD-MCNC: 10.5 G/DL (ref 12–16)
LYMPHOCYTES # BLD: 1.3 K/UL (ref 0.9–3.6)
LYMPHOCYTES NFR BLD: 8 % (ref 21–52)
MCH RBC QN AUTO: 26.5 PG (ref 24–34)
MCHC RBC AUTO-ENTMCNC: 32.5 G/DL (ref 31–37)
MCV RBC AUTO: 81.6 FL (ref 74–97)
MONOCYTES # BLD: 0.8 K/UL (ref 0.05–1.2)
MONOCYTES NFR BLD: 5 % (ref 3–10)
NEUTS SEG # BLD: 14 K/UL (ref 1.8–8)
NEUTS SEG NFR BLD: 87 % (ref 40–73)
PLATELET # BLD AUTO: 311 K/UL (ref 135–420)
PMV BLD AUTO: 10.4 FL (ref 9.2–11.8)
POTASSIUM SERPL-SCNC: 3.7 MMOL/L (ref 3.5–5.5)
PROT SERPL-MCNC: 7.6 G/DL (ref 6.4–8.2)
RBC # BLD AUTO: 3.96 M/UL (ref 4.2–5.3)
SERVICE CMNT-IMP: NORMAL
SERVICE CMNT-IMP: NORMAL
SODIUM SERPL-SCNC: 136 MMOL/L (ref 136–145)
WBC # BLD AUTO: 16.1 K/UL (ref 4.6–13.2)

## 2018-08-10 PROCEDURE — 97110 THERAPEUTIC EXERCISES: CPT

## 2018-08-10 PROCEDURE — A4216 STERILE WATER/SALINE, 10 ML: HCPCS | Performed by: INTERNAL MEDICINE

## 2018-08-10 PROCEDURE — 97530 THERAPEUTIC ACTIVITIES: CPT

## 2018-08-10 PROCEDURE — 36415 COLL VENOUS BLD VENIPUNCTURE: CPT | Performed by: INTERNAL MEDICINE

## 2018-08-10 PROCEDURE — 74011636637 HC RX REV CODE- 636/637: Performed by: INTERNAL MEDICINE

## 2018-08-10 PROCEDURE — 65270000029 HC RM PRIVATE

## 2018-08-10 PROCEDURE — 80053 COMPREHEN METABOLIC PANEL: CPT | Performed by: INTERNAL MEDICINE

## 2018-08-10 PROCEDURE — 74011636637 HC RX REV CODE- 636/637: Performed by: HOSPITALIST

## 2018-08-10 PROCEDURE — 74011250636 HC RX REV CODE- 250/636: Performed by: HOSPITALIST

## 2018-08-10 PROCEDURE — 77030011256 HC DRSG MEPILEX <16IN NO BORD MOLN -A

## 2018-08-10 PROCEDURE — 82962 GLUCOSE BLOOD TEST: CPT

## 2018-08-10 PROCEDURE — 97112 NEUROMUSCULAR REEDUCATION: CPT

## 2018-08-10 PROCEDURE — 77030037878 HC DRSG MEPILEX >48IN BORD MOLN -B

## 2018-08-10 PROCEDURE — 74011250636 HC RX REV CODE- 250/636: Performed by: INTERNAL MEDICINE

## 2018-08-10 PROCEDURE — 74011250637 HC RX REV CODE- 250/637: Performed by: INTERNAL MEDICINE

## 2018-08-10 PROCEDURE — 85025 COMPLETE CBC W/AUTO DIFF WBC: CPT | Performed by: INTERNAL MEDICINE

## 2018-08-10 PROCEDURE — 74011250637 HC RX REV CODE- 250/637: Performed by: HOSPITALIST

## 2018-08-10 RX ORDER — TELMISARTAN 80 MG/1
80 TABLET ORAL DAILY
Status: DISCONTINUED | OUTPATIENT
Start: 2018-08-11 | End: 2018-08-11

## 2018-08-10 RX ORDER — TELMISARTAN 40 MG/1
40 TABLET ORAL
Status: COMPLETED | OUTPATIENT
Start: 2018-08-10 | End: 2018-08-10

## 2018-08-10 RX ORDER — ADHESIVE BANDAGE
30 BANDAGE TOPICAL DAILY
Status: DISCONTINUED | OUTPATIENT
Start: 2018-08-10 | End: 2018-08-13 | Stop reason: HOSPADM

## 2018-08-10 RX ORDER — LEVOFLOXACIN 5 MG/ML
750 INJECTION, SOLUTION INTRAVENOUS
Status: DISCONTINUED | OUTPATIENT
Start: 2018-08-11 | End: 2018-08-12

## 2018-08-10 RX ORDER — FLUCONAZOLE 2 MG/ML
200 INJECTION, SOLUTION INTRAVENOUS EVERY 24 HOURS
Status: DISCONTINUED | OUTPATIENT
Start: 2018-08-10 | End: 2018-08-12

## 2018-08-10 RX ADMIN — SODIUM PHOSPHATE, DIBASIC AND SODIUM PHOSPHATE, MONOBASIC 1 ENEMA: 7; 19 ENEMA RECTAL at 12:11

## 2018-08-10 RX ADMIN — TELMISARTAN 40 MG: 40 TABLET ORAL at 12:12

## 2018-08-10 RX ADMIN — HEPARIN SODIUM 5000 UNITS: 5000 INJECTION INTRAVENOUS; SUBCUTANEOUS at 21:45

## 2018-08-10 RX ADMIN — WATER 1 G: 1 INJECTION INTRAMUSCULAR; INTRAVENOUS; SUBCUTANEOUS at 01:04

## 2018-08-10 RX ADMIN — FUROSEMIDE 40 MG: 10 INJECTION, SOLUTION INTRAMUSCULAR; INTRAVENOUS at 09:25

## 2018-08-10 RX ADMIN — INSULIN GLARGINE 10 UNITS: 100 INJECTION, SOLUTION SUBCUTANEOUS at 21:47

## 2018-08-10 RX ADMIN — HYDROCODONE BITARTRATE AND ACETAMINOPHEN 1 TABLET: 10; 325 TABLET ORAL at 09:42

## 2018-08-10 RX ADMIN — HYDROCHLOROTHIAZIDE 25 MG: 25 TABLET ORAL at 09:25

## 2018-08-10 RX ADMIN — GABAPENTIN 100 MG: 100 CAPSULE ORAL at 16:01

## 2018-08-10 RX ADMIN — TELMISARTAN 40 MG: 40 TABLET ORAL at 09:25

## 2018-08-10 RX ADMIN — MAGNESIUM HYDROXIDE 30 ML: 400 SUSPENSION ORAL at 16:01

## 2018-08-10 RX ADMIN — OMEPRAZOLE 20 MG: 20 CAPSULE, DELAYED RELEASE ORAL at 09:25

## 2018-08-10 RX ADMIN — TROSPIUM CHLORIDE 60 MG: 20 TABLET, FILM COATED ORAL at 09:40

## 2018-08-10 RX ADMIN — MORPHINE SULFATE 2 MG: 2 INJECTION, SOLUTION INTRAMUSCULAR; INTRAVENOUS at 01:04

## 2018-08-10 RX ADMIN — Medication 1 CAPSULE: at 09:24

## 2018-08-10 RX ADMIN — HYDROCODONE BITARTRATE AND ACETAMINOPHEN 1 TABLET: 10; 325 TABLET ORAL at 05:41

## 2018-08-10 RX ADMIN — MORPHINE SULFATE 2 MG: 2 INJECTION, SOLUTION INTRAMUSCULAR; INTRAVENOUS at 21:45

## 2018-08-10 RX ADMIN — INSULIN LISPRO 3 UNITS: 100 INJECTION, SOLUTION INTRAVENOUS; SUBCUTANEOUS at 09:24

## 2018-08-10 RX ADMIN — GABAPENTIN 100 MG: 100 CAPSULE ORAL at 09:26

## 2018-08-10 RX ADMIN — FLUCONAZOLE IN SODIUM CHLORIDE 200 MG: 2 INJECTION, SOLUTION INTRAVENOUS at 21:42

## 2018-08-10 RX ADMIN — INSULIN LISPRO 2 UNITS: 100 INJECTION, SOLUTION INTRAVENOUS; SUBCUTANEOUS at 21:55

## 2018-08-10 RX ADMIN — POLYETHYLENE GLYCOL 3350 17 G: 17 POWDER, FOR SOLUTION ORAL at 09:24

## 2018-08-10 RX ADMIN — HEPARIN SODIUM 5000 UNITS: 5000 INJECTION INTRAVENOUS; SUBCUTANEOUS at 12:26

## 2018-08-10 RX ADMIN — HEPARIN SODIUM 5000 UNITS: 5000 INJECTION INTRAVENOUS; SUBCUTANEOUS at 05:40

## 2018-08-10 RX ADMIN — VANCOMYCIN HYDROCHLORIDE 1500 MG: 10 INJECTION, POWDER, LYOPHILIZED, FOR SOLUTION INTRAVENOUS at 13:50

## 2018-08-10 RX ADMIN — INSULIN LISPRO 3 UNITS: 100 INJECTION, SOLUTION INTRAVENOUS; SUBCUTANEOUS at 18:40

## 2018-08-10 RX ADMIN — MULTIPLE VITAMINS W/ MINERALS TAB 1 TABLET: TAB at 09:26

## 2018-08-10 RX ADMIN — VANCOMYCIN HYDROCHLORIDE 1500 MG: 10 INJECTION, POWDER, LYOPHILIZED, FOR SOLUTION INTRAVENOUS at 01:04

## 2018-08-10 RX ADMIN — GABAPENTIN 100 MG: 100 CAPSULE ORAL at 21:48

## 2018-08-10 RX ADMIN — INSULIN LISPRO 3 UNITS: 100 INJECTION, SOLUTION INTRAVENOUS; SUBCUTANEOUS at 13:50

## 2018-08-10 NOTE — PROGRESS NOTES
Pharmacy Dosing Services: Levaquin     The following medication: Levaquin 750 mg IV q24h was automatically dose-adjusted to Levaquin 750 mg IV q48h per [facility]  P&T Committee Protocol, with respect to renal function. Pt Weight:   Wt Readings from Last 1 Encounters:   08/10/18 110 kg (242 lb 8.1 oz)     Serum Creatinine Lab Results   Component Value Date/Time    Creatinine 1.34 (H) 08/10/2018 04:06 AM       Creatinine Clearance Estimated Creatinine Clearance: 45.7 mL/min (based on Cr of 1.34). BUN Lab Results   Component Value Date/Time    BUN 9 08/10/2018 04:06 AM         Pharmacy to continue to monitor patient daily. Will make dosage adjustments based upon changing renal function. Signed Niko Kahn

## 2018-08-10 NOTE — PROGRESS NOTES
0740: Received report from [facility]. JEAN CLAUDE BARRETT. Assumed pt care at this time. 1395: Assessment completed. Patient is A&OX4. Patient is calm and cooperative. Patient's oral mucosa pink and moist, strong  on both upper extremities. Lung sound clear, not appear distress. Bowel sounds hypoative and distant,abd distended, semi soft . Pt complain discomfort for the abd. LBM  Aug 3rd. Pedal pulses are palpable, no complain of any numbness or tingling. IV site dry and dressing intact. Elastic dressing to both leg is clean and dry, Wound vac is in place, pressure on 125, small mount of brown drainage note. Pain is 9/10. PRN pain medication. bed is in lower position, call bell and personal items are within reach. Pain regimen and activities are educated, educated pt to call when getting up to prevent fall. Pt verbalized understanding. 1059: Page Dr Gaviota Benjamin about pt's BP. Notified him that pt's BP had been trending up and HR was 115 last night, but HR back to normal limited. Complained ABD pain and currently on Miralax. 1102: Dr Gaviota Benjamin called back and No order at this time. 1126: Spoke with Dr Gaviota Benjamin and increase the PO Telmisartan to 80mg. 1212: Fleet enema and Telmisartan is given per MAR. Wound noted to the left lower buttock,red discoloration. Anus discharge noted, white, thin.     1324: Spoke with Dr Gaviota Benjamin and order Wound consult for the wound in the buttock area. Notified him positive wound culture and he said he already knew it. 1417: Pt's abd soft but distended, CNA reported that pt did not have much come out from the bowel, \"small amount of liquid\". Pt said that did not have a relieve from the enema. Page Dr Gaviota Benjamin at this time. 1510: Pt said that she had soak enema 2-3years ago and work for her. Spoke with Dr Gaviota Benjamin about pt distended bowel and hypoative bowel sound. Ordered Soak enema one time and Milk of Magnesia 30mg PO daily. 1750:  Soap enema is given per order with Viri Caraballo RN at the bedside, Pt stated that she had been passing more gases after the warm plum juice, coffee and Milk of Magnesia. Instructed pt to try to hold as long as she can after the soap enema applied. 1904: Removed bedside commode at this time. 100mL Brown fluid note. Informed pt to call when she is ready for bedpan.

## 2018-08-10 NOTE — PROGRESS NOTES
Pharmacy Dosing Services: Vancomycin   Received verbal order to continue Vancomycin ( original consult was x 7 days - (day 7 today )   SCr increased from 0.72 yesterday to 1.34 today   WBC increased to 16.1  Temp = 98.5 (oral)  Currently patient is receiving Vancomycin 1500 mg IV q12h for cellulitis   Will order a Vancomycin trough prior to next dose due tomorrow am @ 0200 and adjust dose as necessary  Holly Maker [address]

## 2018-08-10 NOTE — PROGRESS NOTES
Shift summary: Pt A&Ox4, pain managed with PO Norco and IV morphine. Voiding per purewick. Appears to be resting comfortably. No visible signs of distress.

## 2018-08-10 NOTE — WOUND CARE
Wound Care Progress Note     New consult placed by Nurse for wound to left buttock    Assessment:   Communication: A&O x 4   Continent - pt has a purewick in place      Pt requires full assistance in repositioning      Diet: as tolerated     Wraps remain intact to bilateral legs, no open areas noted to sacrum or heels    Edema improving to bilateral lower extremities. 1.  Pt noted to have skin tear to left ischium that remains clinically closed. Protective mepilex border applied to area. Legs and heels remain elevated on pillows. Wound vac remains intact to left lateral leg. Pt's home vac plugged into wall outlet for charging. Wound Recommendations:  Keep protective border to left ischium      Skin Care & Pressure Relief Recommendations:  Minimize layers of linen/pads under patient to optimize support surface. Turn/reposition approximately every 2 hours and offload heels pillows or Prevalon boots.   Manage incontinence / promote continence; Proshield to buttocks and sacrum daily and as needed with incontinence care      Discussed above plan with patient &  Pradip Narayan RN    Transition of Care: Plan to follow weekly and per product recommendations while admitted to hospital.

## 2018-08-10 NOTE — PROGRESS NOTES
INITIAL NUTRITION ASSESSMENT     RECOMMENDATIONS/PLAN:   Continue w/ POC  Monitor labs/lytes, PO intakes, skin integrity, wt, fluid status, BM      REASON FOR ASSESSMENT:     [x] LOS  NUTRITION ASSESSMENT:   Client History: 70 yrs old Female admitted with cellulitis, rhabdo, edema. 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, DM2, GERD, HTN, hypercholesteremia, venous insufficiency    Cultural/Taoism Food Preferences: None Identified    FOOD/NUTRITION HISTORY  Diet History: see glycemic control team noted  Food Allergies:  [x] NKFA   Pertinent PTA Medications: gabapentin, glucotrol, MVI, Miralax, januvia, super omega 3    NUTRITION INTAKE   Diet Order:  Fred 1800      Average PO Intake:       Patient Vitals for the past 100 hrs:   % Diet Eaten   08/09/18 0923 50 %   08/08/18 1557 50 %   08/08/18 1003 100 %   08/07/18 2023 0 %   08/07/18 1014 100 %   08/06/18 1545 100 %      Pertinent Medications:  [x] Reviewed; fish oil, MVI,   Electrolyte Replacement Protocol: []K  []Mg  []PO4    Insulin:  [] SSI  [x] Pre-meal   []  Basal   [] Drip  [] None  Pt expected to meet estimated nutrient needs through next review:          [x]  Yes     [] No;  ANTHROPOMETRICS  Height: 5' 7\" (170.2 cm)       Weight: 110 kg (242 lb 8.1 oz)    BMI: 38 kg/m^2  -  obese (30%-39.9% BMI)        Weight change: no wt loss noted in chart hx                                   Comparison to Reference Standards:  IBW: 135 lbs      %IBW: 179%      AdjBW: 73.5 kg    NUTRITION-FOCUSED PHYSICAL ASSESSMENT  Skin: No PU. GI: +BM 8/6/18    BIOCHEMICAL DATA & MEDICAL TESTS  Pertinent Labs:  [x] Reviewed; glucose-188     NUTRITION PRESCRIPTION  Calories: 0623-0238 kcal/day based on 25-30kcal/kg of IBW  Protein: 73g/day based on 1.2 g/kg of IBW  CHO: 191-229 g/day based on 50% of total energy  Fluid: 0144-2551 ml/day based on 1 kcal/ml      NUTRITION DIAGNOSES:   1.  At risk for inadequate oral intake related to LOS as evidence by Day 6 at [facility]. NUTRITION INTERVENTIONS:   INTERVENTIONS:        GOALS:  1. Other:continue w/ POC 1. Continue to encourage PO intake >50% at all meals by next review 5 days     LEARNING NEEDS (Diet, Supplementation, Food/Nutrient-Drug Interaction): Will defer to glycemic control team     NUTRITION MONITORING /EVALUATION:   Follow PO intake  Monitor wt  Monitor renal labs, electrolytes, fluid status    [] Participated in Interdisciplinary Rounds  [x] [address] [place] [address] Reviewed/Documented  DISCHARGE NUTRITION RECOMMENDATIONS ADDRESSED:        [x] To be determined closer to discharge    NUTRITION RISK:     [x]  At risk                     []  Not currently at risk     Will follow-up per policy.   Yoel Wing 1

## 2018-08-10 NOTE — ROUTINE PROCESS
Bedside and Verbal shift change report given to Suresh Martinez RN (oncoming nurse) by JERRY Worrell RN (offgoing nurse). Report included the following information SBAR, Kardex, Intake/Output and MAR.

## 2018-08-10 NOTE — PROGRESS NOTES
Hospitalist Progress Note    Patient: Keira Devi MRN: [id number]  CSN: [id number]    YOB: 1940  Age: 70 y.o. Sex: female    DOA: 8/3/2018 LOS:  LOS: 6 days                Assessment/Plan     Patient Active Problem List   Diagnosis Code    Rhabdomyolysis M62.82    Lactic acidosis E87.2    Leg ulcer ([facility] L97.909    Venous insufficiency I87.2    HTN (hypertension) I10    Hypercholesteremia E78.00    DM2 (diabetes mellitus, type 2) ([facility] E11.9    Chronic ulcer of left lower extremity with fat layer exposed ([facility] W50.717    Cellulitis L03.90            71 yo female admitted for cellulitis, rhabdo    Rhabdomyolysis - resolved     Cellulitis with leg ulcers - improving  Lactate has normalized  Continue broad spectrum abx with Rocephin, Vanc andlevaquin. Continue wound care . Wound cultures with GNR, candida  Wbc increasing even with 3 antibiotics, added antifungal    Edema -  on lasix, keep legs elevated  Creatine increasing, will stop lasix for now, follow renal function.     DM - start on lantus, continue sliding scale   holding Januvia and Glipizide     Constipation - on miralax, added MOM, enema as needed.       Chronic pain - Continue Norco prn    PT/OT        Disposition : 2-3 days    Review of systems  General: No fevers or chills. Cardiovascular: No chest pain or pressure. No palpitations. Pulmonary: No shortness of breath. Gastrointestinal: No nausea, vomiting. Physical Exam:  General: Awake, cooperative, no acute distress    HEENT: NC, Atraumatic. PERRLA, anicteric sclerae. Lungs: CTA Bilaterally. No Wheezing/Rhonchi/Rales. Heart:  Regular  rhythm,  No murmur, No Rubs, No Gallops  Abdomen: Soft, Non distended, Non tender.  +Bowel sounds,   Extremities: LE swelling bilaterally, bilateral Unna boots  Psych:   Not anxious or agitated. Neurologic:  No acute neurological deficit.            Vital signs/Intake and Output:  Visit Vitals    /86    Pulse (!) 108    Temp 98.2 °F (36.8 °C)    Resp 18    Ht 5' 7\" (1.702 m)    Wt 110 kg (242 lb 8.1 oz)    SpO2 98%    BMI 37.98 kg/m2     Current Shift:     Last three shifts:  08/09 0701 - 08/10 1900  In: 480 [P.O.:480]  Out: 2201 [Urine:2201]            Labs: Results:       Chemistry Recent Labs      08/10/18   0406  08/09/18   0412  08/08/18   0024   GLU  188*  152*  152*   NA  136  139  140   K  3.7  3.4*  3.3*   CL  99*  103  105   CO2  32  32  28   BUN  9  6*  6*   CREA  1.34*  0.72  0.65   CA  9.5  9.0  9.0   AGAP  5  4  7   BUCR  7*  8*  9*   AP  87  83  84   TP  7.6  6.8  6.4   ALB  1.6*  1.5*  1.6*   GLOB  6.0*  5.3*  4.8*   AGRAT  0.3*  0.3*  0.3*      CBC w/Diff Recent Labs      08/10/18   0406  08/09/18 0412  08/08/18   0024   WBC  16.1*  13.7*  13.2   RBC  3.96*  3.38*  3.38*   HGB  10.5*  9.0*  8.9*   HCT  32.3*  27.8*  27.8*   PLT  311  269  221   GRANS  87*  75  77*   LYMPH  8*  13*  14*   EOS  0  5  0      Cardiac Enzymes Recent Labs      08/08/18   0024   CPK  147      Coagulation No results for input(s): PTP, INR, APTT in the last 72 hours. No lab exists for component: INREXT, INREXT    Lipid Panel No results found for: CHOL, CHOLPOCT, CHOLX, CHLST, CHOLV, 902216, HDL, LDL, LDLC, DLDLP, 226747, VLDLC, VLDL, TGLX, TRIGL, TRIGP, TGLPOCT, CHHD, CHHDX   BNP No results for input(s): BNPP in the last 72 hours.    Liver Enzymes Recent Labs      08/10/18   0406   TP  7.6   ALB  1.6*   AP  87   SGOT  21      Thyroid Studies No results found for: T4, T3U, TSH, TSHEXT, TSHEXT     Procedures/imaging: see electronic medical records for all procedures/Xrays and details which were not copied into this note but were reviewed prior to creation of Plan

## 2018-08-10 NOTE — PROGRESS NOTES
Chart reviewed. Met with patient at bedside. Pt is aware [facility] and [facility] have declined her for rehab. Discussed the need to send additional referrals and patient agreed. CMS will send referrals to area rehabs for continuity of care. CM will cont to follow. Care Management Interventions  PCP Verified by CM:  Yes  Mode of Transport at Discharge: S  Transition of Care Consult (CM Consult): SNF  [address]: No  Reason Outside [place]: Patient already serviced by other home care/hospice agency (current with [facility])  Health Maintenance Reviewed: Yes  Physical Therapy Consult: Yes  Occupational Therapy Consult: Yes  Current Support Network: Lives Alone, Family Lives Nearby  Confirm Follow Up Transport: Family  Plan discussed with Pt/Family/Caregiver: Yes  Freedom of Choice Offered: Yes  Discharge Location  Discharge Placement: Skilled nursing facility

## 2018-08-10 NOTE — PROGRESS NOTES
Problem: Mobility Impaired (Adult and Pediatric)  Goal: *Acute Goals and Plan of Care (Insert Text)  Physical TherapyGoals   Initiated 8/7/2018 to be met within 7 days  1. Bed mobility: Rolling L to R to with mod A with use of HR for positioning. 2. Transfer: Supine to/from Sit with mod A with HR for change of position/prep for ambulation. 3. Activity Tolerance: Tolerate EOB sitting 5-10 minutes for ADL/balance activities. 4. Transfer: Sit to/from Stand with mod assist/RW in prep for ambulation. 4. Ambulation:  Ambulate 5-20 ft. max/mod A with RW for increased functional mobility. Outcome: Not Progressing Towards Goal  physical Therapy TREATMENT    Patient: Demetrio House (89 y.o. female)  Date: 8/10/2018  Diagnosis: Rhabdomyolysis  Lactic acidosis  Leg ulcer (HCC) Rhabdomyolysis       Precautions: Fall, Skin   Chart, physical therapy assessment, plan of care and goals were reviewed. ASSESSMENT:  Pt is able to increase time spent sitting EOB, but continues to require significant assist to complete all tasks. Pt's mobility is greatly limited by abdominal pain, which is very distended. HR and SPO2 were also concerning, as pt has HR range of 102 -147  and 87-92% SPO2 with light exercise. RN present for most of session and aware of reasoning to end session. Progression toward goals:  []      Improving appropriately and progressing toward goals  []      Improving slowly and progressing toward goals  []      Not making progress toward goals and plan of care will be adjusted     PLAN:  Patient continues to benefit from skilled intervention to address the above impairments. Continue treatment per established plan of care. Discharge Recommendations:  Rehab  Further Equipment Recommendations for Discharge:  N/A     SUBJECTIVE:   Patient stated [facility] stomach feels so bad.     OBJECTIVE DATA SUMMARY:   Critical Behavior:  Neurologic State: Alert, Appropriate for age  Orientation Level: Oriented X4  Cognition: Appropriate decision making, Appropriate for age attention/concentration, Appropriate safety awareness, Follows commands  Safety/Judgement: Decreased insight into deficits  Functional Mobility Training:  Bed Mobility:  Rolling: Moderate assistance;Maximum assistance  Supine to Sit: Moderate assistance;Maximum assistance  Sit to Supine: Maximum assistance; Total assistance  Scooting: Maximum assistance; Total assistance  Transfers:  Not attempted due to elevated HR  Balance:  Sitting: Impaired; With support  Sitting - Static: Fair (occasional)  Sitting - Dynamic: Poor (constant support)  Therapeutic Exercises:       EXERCISE   Sets   Reps   Active Active Assist   Passive Self ROM   Comments   Ankle Pumps    [] [] [] []    Quad Sets/Glut Sets 1 10 [x] [] [] []    Hamstring Sets   [] [] [] []    Short Arc Quads   [] [] [] []    Heel Slides   [] [] [] []    Straight Leg Raises   [] [] [] []    Hip Abd/Add 1 5 [x] [] [] []    Long Arc Quads 1 10 [x] [] [] []    Seated Marching 1 5 [x] [] [] []    Standing Marching   [] [] [] []       [] [] [] []        Pain:  Pain Scale 1: Numeric (0 - 10)  Pain Intensity 1: 10  Pain out: 10  Pain Location 1: Abdomen;Back  Pain Description 1: Aching  Pain Intervention(s) 1: Medication (see MAR); Cold pack  Activity Tolerance:   Fair  Please refer to the flowsheet for vital signs taken during this treatment.   After treatment:   [] Patient left in no apparent distress sitting up in chair  [x] Patient left in no apparent distress in bed  [x] Call bell left within reach  [x] Nurse present   [] Caregiver present  [] Bed alarm activated      Trang Rod PTA   Time Calculation: 33 mins

## 2018-08-11 LAB
ANION GAP SERPL CALC-SCNC: 11 MMOL/L (ref 3–18)
BUN SERPL-MCNC: 22 MG/DL (ref 7–18)
BUN/CREAT SERPL: 8 (ref 12–20)
CALCIUM SERPL-MCNC: 9.6 MG/DL (ref 8.5–10.1)
CHLORIDE SERPL-SCNC: 98 MMOL/L (ref 100–108)
CO2 SERPL-SCNC: 28 MMOL/L (ref 21–32)
CREAT SERPL-MCNC: 2.7 MG/DL (ref 0.6–1.3)
DATE LAST DOSE: ABNORMAL
GLUCOSE BLD STRIP.AUTO-MCNC: 146 MG/DL (ref 70–110)
GLUCOSE BLD STRIP.AUTO-MCNC: 157 MG/DL (ref 70–110)
GLUCOSE BLD STRIP.AUTO-MCNC: 184 MG/DL (ref 70–110)
GLUCOSE BLD STRIP.AUTO-MCNC: 196 MG/DL (ref 70–110)
GLUCOSE SERPL-MCNC: 118 MG/DL (ref 74–99)
POTASSIUM SERPL-SCNC: 4.2 MMOL/L (ref 3.5–5.5)
REPORTED DOSE,DOSE: ABNORMAL UNITS
REPORTED DOSE/TIME,TMG: 1400
SODIUM SERPL-SCNC: 137 MMOL/L (ref 136–145)
VANCOMYCIN TROUGH SERPL-MCNC: 42.7 UG/ML (ref 10–20)

## 2018-08-11 PROCEDURE — 74011250637 HC RX REV CODE- 250/637: Performed by: HOSPITALIST

## 2018-08-11 PROCEDURE — 74011636637 HC RX REV CODE- 636/637: Performed by: INTERNAL MEDICINE

## 2018-08-11 PROCEDURE — 87040 BLOOD CULTURE FOR BACTERIA: CPT | Performed by: INTERNAL MEDICINE

## 2018-08-11 PROCEDURE — 94760 N-INVAS EAR/PLS OXIMETRY 1: CPT

## 2018-08-11 PROCEDURE — 74011250637 HC RX REV CODE- 250/637: Performed by: INTERNAL MEDICINE

## 2018-08-11 PROCEDURE — 74011250636 HC RX REV CODE- 250/636: Performed by: INTERNAL MEDICINE

## 2018-08-11 PROCEDURE — 97110 THERAPEUTIC EXERCISES: CPT

## 2018-08-11 PROCEDURE — 97530 THERAPEUTIC ACTIVITIES: CPT

## 2018-08-11 PROCEDURE — A4216 STERILE WATER/SALINE, 10 ML: HCPCS | Performed by: INTERNAL MEDICINE

## 2018-08-11 PROCEDURE — 74011250636 HC RX REV CODE- 250/636: Performed by: HOSPITALIST

## 2018-08-11 PROCEDURE — 65270000029 HC RM PRIVATE

## 2018-08-11 PROCEDURE — 36415 COLL VENOUS BLD VENIPUNCTURE: CPT | Performed by: INTERNAL MEDICINE

## 2018-08-11 PROCEDURE — 74011000250 HC RX REV CODE- 250: Performed by: INTERNAL MEDICINE

## 2018-08-11 PROCEDURE — 80202 ASSAY OF VANCOMYCIN: CPT | Performed by: INTERNAL MEDICINE

## 2018-08-11 PROCEDURE — 74011000272 HC RX REV CODE- 272: Performed by: INTERNAL MEDICINE

## 2018-08-11 PROCEDURE — 82962 GLUCOSE BLOOD TEST: CPT

## 2018-08-11 PROCEDURE — 74011636637 HC RX REV CODE- 636/637: Performed by: HOSPITALIST

## 2018-08-11 PROCEDURE — 94640 AIRWAY INHALATION TREATMENT: CPT

## 2018-08-11 PROCEDURE — 80048 BASIC METABOLIC PNL TOTAL CA: CPT | Performed by: INTERNAL MEDICINE

## 2018-08-11 RX ORDER — SODIUM CHLORIDE 9 MG/ML
75 INJECTION, SOLUTION INTRAVENOUS CONTINUOUS
Status: DISCONTINUED | OUTPATIENT
Start: 2018-08-11 | End: 2018-08-13 | Stop reason: HOSPADM

## 2018-08-11 RX ADMIN — ACETAMINOPHEN 650 MG: 650 SUPPOSITORY RECTAL at 12:08

## 2018-08-11 RX ADMIN — HYDROCODONE BITARTRATE AND ACETAMINOPHEN 1 TABLET: 10; 325 TABLET ORAL at 04:05

## 2018-08-11 RX ADMIN — GABAPENTIN 100 MG: 100 CAPSULE ORAL at 21:45

## 2018-08-11 RX ADMIN — TELMISARTAN 80 MG: 80 TABLET ORAL at 09:37

## 2018-08-11 RX ADMIN — INSULIN GLARGINE 10 UNITS: 100 INJECTION, SOLUTION SUBCUTANEOUS at 21:44

## 2018-08-11 RX ADMIN — Medication 500 ML: at 23:21

## 2018-08-11 RX ADMIN — POLYETHYLENE GLYCOL 3350 17 G: 17 POWDER, FOR SOLUTION ORAL at 09:36

## 2018-08-11 RX ADMIN — HEPARIN SODIUM 5000 UNITS: 5000 INJECTION INTRAVENOUS; SUBCUTANEOUS at 12:08

## 2018-08-11 RX ADMIN — INSULIN LISPRO 3 UNITS: 100 INJECTION, SOLUTION INTRAVENOUS; SUBCUTANEOUS at 16:58

## 2018-08-11 RX ADMIN — SODIUM CHLORIDE 75 ML/HR: 900 INJECTION, SOLUTION INTRAVENOUS at 16:58

## 2018-08-11 RX ADMIN — OMEPRAZOLE 20 MG: 20 CAPSULE, DELAYED RELEASE ORAL at 09:37

## 2018-08-11 RX ADMIN — INSULIN LISPRO 3 UNITS: 100 INJECTION, SOLUTION INTRAVENOUS; SUBCUTANEOUS at 21:44

## 2018-08-11 RX ADMIN — MULTIPLE VITAMINS W/ MINERALS TAB 1 TABLET: TAB at 09:37

## 2018-08-11 RX ADMIN — MAGNESIUM HYDROXIDE 30 ML: 400 SUSPENSION ORAL at 09:36

## 2018-08-11 RX ADMIN — GABAPENTIN 100 MG: 100 CAPSULE ORAL at 16:58

## 2018-08-11 RX ADMIN — HYDROCHLOROTHIAZIDE 25 MG: 25 TABLET ORAL at 09:37

## 2018-08-11 RX ADMIN — WATER 1 G: 1 INJECTION INTRAMUSCULAR; INTRAVENOUS; SUBCUTANEOUS at 03:57

## 2018-08-11 RX ADMIN — HEPARIN SODIUM 5000 UNITS: 5000 INJECTION INTRAVENOUS; SUBCUTANEOUS at 21:45

## 2018-08-11 RX ADMIN — INSULIN LISPRO 3 UNITS: 100 INJECTION, SOLUTION INTRAVENOUS; SUBCUTANEOUS at 12:07

## 2018-08-11 RX ADMIN — IPRATROPIUM BROMIDE AND ALBUTEROL SULFATE 3 ML: .5; 3 SOLUTION RESPIRATORY (INHALATION) at 15:39

## 2018-08-11 RX ADMIN — TROSPIUM CHLORIDE 60 MG: 20 TABLET, FILM COATED ORAL at 09:37

## 2018-08-11 RX ADMIN — HYDROCODONE BITARTRATE AND ACETAMINOPHEN 1 TABLET: 10; 325 TABLET ORAL at 17:10

## 2018-08-11 RX ADMIN — HEPARIN SODIUM 5000 UNITS: 5000 INJECTION INTRAVENOUS; SUBCUTANEOUS at 03:57

## 2018-08-11 RX ADMIN — LEVOFLOXACIN 750 MG: 5 INJECTION, SOLUTION INTRAVENOUS at 23:21

## 2018-08-11 RX ADMIN — FLUCONAZOLE IN SODIUM CHLORIDE 200 MG: 2 INJECTION, SOLUTION INTRAVENOUS at 21:44

## 2018-08-11 RX ADMIN — GABAPENTIN 100 MG: 100 CAPSULE ORAL at 09:37

## 2018-08-11 RX ADMIN — Medication 1 CAPSULE: at 09:37

## 2018-08-11 NOTE — ROUTINE PROCESS
Bedside and Verbal shift change report given to HALIE Colunga (oncoming nurse) by Tyson Pat Rn (offgoing nurse). Report included the following information SBAR, Kardex, Intake/Output, MAR and Alarm Parameters .

## 2018-08-11 NOTE — PROGRESS NOTES
Problem: Mobility Impaired (Adult and Pediatric)  Goal: *Acute Goals and Plan of Care (Insert Text)  Physical TherapyGoals   Initiated 8/7/2018 to be met within 7 days  1. Bed mobility: Rolling L to R to with mod A with use of HR for positioning. 2. Transfer: Supine to/from Sit with mod A with HR for change of position/prep for ambulation. 3. Activity Tolerance: Tolerate EOB sitting 5-10 minutes for ADL/balance activities. 4. Transfer: Sit to/from Stand with mod assist/RW in prep for ambulation. 4. Ambulation:  Ambulate 5-20 ft. max/mod A with RW for increased functional mobility. Outcome: Progressing Towards Goal  physical Therapy TREATMENT    Patient: Kendra Gaffney (89 y.o. female)  Date: 8/11/2018  Diagnosis: Rhabdomyolysis  Lactic acidosis  Leg ulcer (HCC) Rhabdomyolysis       Precautions: Fall, Skin   Chart, physical therapy assessment, plan of care and goals were reviewed. ASSESSMENT:  Pt found lethargic, with C/o LLQ pain. L hand pain also noted. Pt is not ability to provide as much self assist for any of today's mobility. Max total assist is provided for transition to EOB and near continuous support for sitting. Tremor present with all active movement. Pt is not able to participate in exercise past R LAQ x 10. Pt totally assisted back to supine, where CNAs assisted with linen changed and positioned for comfort. No progress to note and pt will definitely need placement at D/c. HR range of 101-138, SPO2 86-90% RA (RN informed). Progression toward goals:  []      Improving appropriately and progressing toward goals  []      Improving slowly and progressing toward goals  [x]      Not making progress toward goals. RN and MD informed of declining physical ability     PLAN:  Patient continues to benefit from skilled intervention to address the above impairments. Continue treatment per established plan of care.   Discharge Recommendations:  Rehab or SNF  Further Equipment Recommendations for Discharge:  N/A     SUBJECTIVE:   Patient stated i hurt over here on my left side.     OBJECTIVE DATA SUMMARY:   Critical Behavior:  Neurologic State: Alert  Orientation Level: Oriented X4  Cognition: Appropriate decision making, Appropriate for age attention/concentration, Appropriate safety awareness, Follows commands  Safety/Judgement: Decreased insight into deficits  Functional Mobility Training:  Bed Mobility:  Rolling: Total assistance  Supine to Sit: Maximum assistance; Total assistance  Sit to Supine: Total assistance  Scooting: Total assistance  Balance:  Sitting: Impaired  Sitting - Static: Poor (constant support)  Sitting - Dynamic: Poor (constant support)  Therapeutic Exercises:   Pt only able to complete 1 LAQ on Right side. Pain:  Pain in: 3 (LLQ)  Pain out: 5 (LLQ)  Activity Tolerance:   Poor   Please refer to the flowsheet for vital signs taken during this treatment.   After treatment:   [] Patient left in no apparent distress sitting up in chair  [x] Patient left in no apparent distress in bed  [x] Call bell left within reach  [x] Nursing notified  [] Caregiver present  [] Bed alarm activated      Asad Agrawal PTA   Time Calculation: 31 mins

## 2018-08-11 NOTE — PROGRESS NOTES
Hospitalist Progress Note    Patient: Deysi Cherry MRN: [id number]  CSN: [id number]    YOB: 1940  Age: 70 y.o. Sex: female    DOA: 8/3/2018 LOS:  LOS: 7 days          Chief Complaint: infection          Assessment/Plan     Disposition :  Patient Active Problem List   Diagnosis Code    Rhabdomyolysis M62.82    Lactic acidosis E87.2    Leg ulcer ([facility] [address] [address] L97.909    Venous insufficiency I87.2    HTN (hypertension) I10    Hypercholesteremia E78.00    DM2 (diabetes mellitus, type 2) ([facility] [address] E11.9    Chronic ulcer of left lower extremity with fat layer exposed ([facility] L97.922    Cellulitis L03.90     Cellulitis from leg ulcers. Venous insufficiency with edema./  DM2  Chronic pain. Constipation. TAMANNA. -Rx miralax and dulcolax.  -Hold hctz and ARB. Rx gentle iv hydration given \"bump\" in creatinine on this morning's labs. -Continue abx. Redo blood culture as she has fever. Looks good. Subjective:    Making progress. Fever earlier. On multiple antibiotics.        Review of systems:    Constitutional: denies fevers, chills, myalgias  Respiratory: denies SOB, cough  Cardiovascular: denies chest pain, palpitations  Gastrointestinal: denies nausea, vomiting, diarrhea      Vital signs/Intake and Output:  Visit Vitals    BP (P) 107/66 (BP 1 Location: Left arm, BP Patient Position: At rest;Sitting)    Pulse (!) (P) 109    Temp (P) 99.3 °F (37.4 °C)    Resp (P) 18    Ht 5' 7\" (1.702 m)    Wt 111.1 kg (244 lb 14.9 oz)    SpO2 91%    BMI 38.36 kg/m2     Current Shift:  08/11 0701 - 08/11 1900  In: 240 [P.O.:240]  Out: 450 [Urine:450]  Last three shifts:  08/09 1901 - 08/11 0700  In: 840 [P.O.:840]  Out: 1300 [Urine:1300]    Exam:    General: Well developed, alert, NAD, OX3  Head/Neck: NCAT, supple, No masses, No lymphadenopathy  CVS:Regular rate and rhythm, no M/R/G, S1/S2 heard, no thrill  Lungs:Clear to auscultation bilaterally, no wheezes, rhonchi, or rales  Abdomen: Modest distention, tympanic, no guarding. Bs+. Extremities: No C/C/E, pulses palpable 2+                  Labs: Results:       Chemistry Recent Labs      08/11/18   0145  08/10/18   0406  08/09/18 0412   GLU  118*  188*  152*   NA  137  136  139   K  4.2  3.7  3.4*   CL  98*  99*  103   CO2  28  32  32   BUN  22*  9  6*   CREA  2.70*  1.34*  0.72   CA  9.6  9.5  9.0   AGAP  11  5  4   BUCR  8*  7*  8*   AP   --   87  83   TP   --   7.6  6.8   ALB   --   1.6*  1.5*   GLOB   --   6.0*  5.3*   AGRAT   --   0.3*  0.3*      CBC w/Diff Recent Labs      08/10/18   0406 08/09/18 0412   WBC  16.1*  13.7*   RBC  3.96*  3.38*   HGB  10.5*  9.0*   HCT  32.3*  27.8*   PLT  311  269   GRANS  87*  75   LYMPH  8*  13*   EOS  0  5      Cardiac Enzymes No results for input(s): CPK, CKND1, ANJELICA in the last 72 hours. No lab exists for component: CKRMB, TROIP   Coagulation No results for input(s): PTP, INR, APTT in the last 72 hours. No lab exists for component: INREXT    Lipid Panel No results found for: CHOL, CHOLPOCT, CHOLX, CHLST, CHOLV, 799622, HDL, LDL, LDLC, DLDLP, 074514, VLDLC, VLDL, TGLX, TRIGL, TRIGP, TGLPOCT, CHHD, CHHDX   BNP No results for input(s): BNPP in the last 72 hours.    Liver Enzymes Recent Labs      08/10/18   0406   TP  7.6   ALB  1.6*   AP  87   SGOT  21      Thyroid Studies No results found for: T4, T3U, TSH, TSHEXT     Procedures/imaging: see electronic medical records for all procedures/Xrays and details which were not copied into this note but were reviewed prior to creation of Sherrill Perez MD

## 2018-08-11 NOTE — ROUTINE PROCESS
Bedside and Verbal shift change report given to Elfego Cheek RN (oncoming nurse) by Daquan Martin RN (offgoing nurse). Report included the following information SBAR, Kardex, ED Summary, Procedure Summary, Intake/Output, MAR, Recent Results and Med Rec Status.

## 2018-08-11 NOTE — PROGRESS NOTES
PRN NEB GIVEN FOR SOB. BS DIMINISHED ESPECIALLY BASES. ABDOMINAL DISTENTION NOTED.   PATIENT INDICATES THAT SHE IS CONSTIPATED AND UNCOMFORTABLE.  SPO2 90-92% ON RA.  NO SIGNIFICANT CHANGE NOTED POST TX.

## 2018-08-11 NOTE — PROGRESS NOTES
Patient has a temp of 101.1 and heart rate of 109, MEWS of a 4. Tylenol suppository given. Encouraged IS use, HOB elevated. Dr. Marti Esteban made aware.    1300: patient temperature post tylenol suppository is 99.8 and heart rate 109.    1530: spoke with Dr. Marti Esteban regarding sepsis alert message in epic, blood cultures already ordered, CBC ordered for the am.  No other order at this time. Will continue to monitor patient. 1730: external catheter only had 100cc of urine out. [place] scanned patient for >999cc. [place] catheter inserted per physician order. Placed with 2 RN's. Emptied 1600cc of yellow urine      Bedside and Verbal shift change report given to A. Colman Sandhoff (oncoming nurse) by Clarence Myoa RN (offgoing nurse). Report included the following information SBAR, Kardex, Intake/Output and MAR.

## 2018-08-12 LAB
ANION GAP SERPL CALC-SCNC: 1 MMOL/L (ref 3–18)
BACTERIA SPEC CULT: ABNORMAL
BASOPHILS # BLD: 0 K/UL (ref 0–0.1)
BASOPHILS NFR BLD: 0 % (ref 0–2)
BUN SERPL-MCNC: 25 MG/DL (ref 7–18)
BUN/CREAT SERPL: 13 (ref 12–20)
CALCIUM SERPL-MCNC: 9.1 MG/DL (ref 8.5–10.1)
CHLORIDE SERPL-SCNC: 103 MMOL/L (ref 100–108)
CO2 SERPL-SCNC: 35 MMOL/L (ref 21–32)
CREAT SERPL-MCNC: 1.9 MG/DL (ref 0.6–1.3)
DIFFERENTIAL METHOD BLD: ABNORMAL
EOSINOPHIL # BLD: 0.3 K/UL (ref 0–0.4)
EOSINOPHIL NFR BLD: 2 % (ref 0–5)
ERYTHROCYTE [DISTWIDTH] IN BLOOD BY AUTOMATED COUNT: 15.9 % (ref 11.6–14.5)
GLUCOSE BLD STRIP.AUTO-MCNC: 115 MG/DL (ref 70–110)
GLUCOSE BLD STRIP.AUTO-MCNC: 182 MG/DL (ref 70–110)
GLUCOSE BLD STRIP.AUTO-MCNC: 187 MG/DL (ref 70–110)
GLUCOSE BLD STRIP.AUTO-MCNC: 207 MG/DL (ref 70–110)
GLUCOSE SERPL-MCNC: 149 MG/DL (ref 74–99)
GRAM STN SPEC: ABNORMAL
HCT VFR BLD AUTO: 27.9 % (ref 35–45)
HGB BLD-MCNC: 8.9 G/DL (ref 12–16)
LYMPHOCYTES # BLD: 2.2 K/UL (ref 0.9–3.6)
LYMPHOCYTES NFR BLD: 12 % (ref 21–52)
MCH RBC QN AUTO: 26 PG (ref 24–34)
MCHC RBC AUTO-ENTMCNC: 31.9 G/DL (ref 31–37)
MCV RBC AUTO: 81.6 FL (ref 74–97)
MONOCYTES # BLD: 1.1 K/UL (ref 0.05–1.2)
MONOCYTES NFR BLD: 6 % (ref 3–10)
NEUTS SEG # BLD: 14.9 K/UL (ref 1.8–8)
NEUTS SEG NFR BLD: 80 % (ref 40–73)
PLATELET # BLD AUTO: 340 K/UL (ref 135–420)
PMV BLD AUTO: 10 FL (ref 9.2–11.8)
POTASSIUM SERPL-SCNC: 3.6 MMOL/L (ref 3.5–5.5)
RBC # BLD AUTO: 3.42 M/UL (ref 4.2–5.3)
SERVICE CMNT-IMP: ABNORMAL
SODIUM SERPL-SCNC: 139 MMOL/L (ref 136–145)
VANCOMYCIN SERPL-MCNC: 23.3 UG/ML (ref 5–40)
WBC # BLD AUTO: 18.4 K/UL (ref 4.6–13.2)

## 2018-08-12 PROCEDURE — 65270000029 HC RM PRIVATE

## 2018-08-12 PROCEDURE — 74011636637 HC RX REV CODE- 636/637: Performed by: INTERNAL MEDICINE

## 2018-08-12 PROCEDURE — 74011250637 HC RX REV CODE- 250/637: Performed by: INTERNAL MEDICINE

## 2018-08-12 PROCEDURE — 74011250636 HC RX REV CODE- 250/636: Performed by: INTERNAL MEDICINE

## 2018-08-12 PROCEDURE — 85025 COMPLETE CBC W/AUTO DIFF WBC: CPT | Performed by: INTERNAL MEDICINE

## 2018-08-12 PROCEDURE — 97530 THERAPEUTIC ACTIVITIES: CPT

## 2018-08-12 PROCEDURE — 74011636637 HC RX REV CODE- 636/637: Performed by: HOSPITALIST

## 2018-08-12 PROCEDURE — 74011000258 HC RX REV CODE- 258: Performed by: INTERNAL MEDICINE

## 2018-08-12 PROCEDURE — 80202 ASSAY OF VANCOMYCIN: CPT | Performed by: INTERNAL MEDICINE

## 2018-08-12 PROCEDURE — 82962 GLUCOSE BLOOD TEST: CPT

## 2018-08-12 PROCEDURE — 77030037878 HC DRSG MEPILEX >48IN BORD MOLN -B

## 2018-08-12 PROCEDURE — 74011250637 HC RX REV CODE- 250/637: Performed by: HOSPITALIST

## 2018-08-12 PROCEDURE — 36415 COLL VENOUS BLD VENIPUNCTURE: CPT | Performed by: INTERNAL MEDICINE

## 2018-08-12 PROCEDURE — 97110 THERAPEUTIC EXERCISES: CPT

## 2018-08-12 PROCEDURE — 80048 BASIC METABOLIC PNL TOTAL CA: CPT | Performed by: INTERNAL MEDICINE

## 2018-08-12 RX ORDER — LINEZOLID 2 MG/ML
600 INJECTION, SOLUTION INTRAVENOUS EVERY 12 HOURS
Status: DISCONTINUED | OUTPATIENT
Start: 2018-08-12 | End: 2018-08-13 | Stop reason: HOSPADM

## 2018-08-12 RX ORDER — VANCOMYCIN 2 GRAM/500 ML IN 0.9 % SODIUM CHLORIDE INTRAVENOUS
2000
Status: DISCONTINUED | OUTPATIENT
Start: 2018-08-12 | End: 2018-08-12

## 2018-08-12 RX ADMIN — INSULIN LISPRO 3 UNITS: 100 INJECTION, SOLUTION INTRAVENOUS; SUBCUTANEOUS at 17:18

## 2018-08-12 RX ADMIN — GABAPENTIN 100 MG: 100 CAPSULE ORAL at 15:55

## 2018-08-12 RX ADMIN — HYDROCODONE BITARTRATE AND ACETAMINOPHEN 1 TABLET: 10; 325 TABLET ORAL at 15:55

## 2018-08-12 RX ADMIN — HEPARIN SODIUM 5000 UNITS: 5000 INJECTION INTRAVENOUS; SUBCUTANEOUS at 21:25

## 2018-08-12 RX ADMIN — HEPARIN SODIUM 5000 UNITS: 5000 INJECTION INTRAVENOUS; SUBCUTANEOUS at 14:43

## 2018-08-12 RX ADMIN — INSULIN LISPRO 3 UNITS: 100 INJECTION, SOLUTION INTRAVENOUS; SUBCUTANEOUS at 11:13

## 2018-08-12 RX ADMIN — MAGNESIUM HYDROXIDE 30 ML: 400 SUSPENSION ORAL at 08:21

## 2018-08-12 RX ADMIN — MULTIPLE VITAMINS W/ MINERALS TAB 1 TABLET: TAB at 08:22

## 2018-08-12 RX ADMIN — Medication 1 CAPSULE: at 08:22

## 2018-08-12 RX ADMIN — INSULIN GLARGINE 10 UNITS: 100 INJECTION, SOLUTION SUBCUTANEOUS at 21:25

## 2018-08-12 RX ADMIN — GABAPENTIN 100 MG: 100 CAPSULE ORAL at 08:21

## 2018-08-12 RX ADMIN — POLYETHYLENE GLYCOL 3350 17 G: 17 POWDER, FOR SOLUTION ORAL at 08:21

## 2018-08-12 RX ADMIN — SODIUM CHLORIDE 1.5 G: 900 INJECTION INTRAVENOUS at 21:25

## 2018-08-12 RX ADMIN — LINEZOLID 600 MG: 600 INJECTION, SOLUTION INTRAVENOUS at 17:34

## 2018-08-12 RX ADMIN — INSULIN LISPRO 6 UNITS: 100 INJECTION, SOLUTION INTRAVENOUS; SUBCUTANEOUS at 21:25

## 2018-08-12 RX ADMIN — GABAPENTIN 100 MG: 100 CAPSULE ORAL at 21:25

## 2018-08-12 RX ADMIN — HYDROCODONE BITARTRATE AND ACETAMINOPHEN 1 TABLET: 10; 325 TABLET ORAL at 11:13

## 2018-08-12 RX ADMIN — OMEPRAZOLE 20 MG: 20 CAPSULE, DELAYED RELEASE ORAL at 08:21

## 2018-08-12 RX ADMIN — SODIUM CHLORIDE 1.5 G: 900 INJECTION INTRAVENOUS at 15:54

## 2018-08-12 RX ADMIN — SODIUM CHLORIDE 75 ML/HR: 900 INJECTION, SOLUTION INTRAVENOUS at 11:13

## 2018-08-12 RX ADMIN — HEPARIN SODIUM 5000 UNITS: 5000 INJECTION INTRAVENOUS; SUBCUTANEOUS at 05:08

## 2018-08-12 NOTE — PROGRESS NOTES
0900: Patient c/o pain in left hand and arm, swelling, limited movement in arm. Dr. Kathleen Hurtado made aware. 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 Kahtleen Hurtado made aware and will address antibiotics. 1900: Bedside and Verbal shift change report given to JERRY Heard (oncoming nurse) by Rachel Lin RN (offgoing nurse). Report included the following information SBAR, Kardex, Intake/Output and MAR.

## 2018-08-12 NOTE — PROGRESS NOTES
Hospitalist Progress Note    Patient: Gregor Lisa MRN: [id number]  CSN: [id number]    YOB: 1940  Age: 70 y.o. Sex: female    DOA: 8/3/2018 LOS:  LOS: 8 days          Chief Complaint: constipation          Assessment/Plan       Disposition :  Patient Active Problem List   Diagnosis Code    Rhabdomyolysis M62.82    Lactic acidosis E87.2    Leg ulcer ([facility] L97.909    Venous insufficiency I87.2    HTN (hypertension) I10    Hypercholesteremia E78.00    DM2 (diabetes mellitus, type 2) ([facility] E11.9    Chronic ulcer of left lower extremity with fat layer exposed ([facility] L97.922    Cellulitis L03.90       Cellulitis from leg ulcers. Venous insufficiency with edema. DM2  Chronic pain. Constipation. TAMANNA.     -Rx miralax and dulcolax.  -Hold hctz and ARB. Rx gentle iv hydration given \"bump\" in creatinine in morning's labs 8/10. Creatinine showing improvement already with these changes. .  -Continue abx. Watch WBC's. Clinically looks good. Subjective:    Feeling better. Received enema yesterday. BM x 2 last night.     Review of systems:    Constitutional: denies fevers, chills, myalgias  Respiratory: denies SOB, cough  Cardiovascular: denies chest pain, palpitations  Gastrointestinal: denies nausea, vomiting, diarrhea      Vital signs/Intake and Output:  Visit Vitals    /76 (BP 1 Location: Right arm, BP Patient Position: At rest)    Pulse 91    Temp 99.2 °F (37.3 °C)    Resp 18    Ht 5' 7\" (1.702 m)    Wt 111.1 kg (244 lb 14.9 oz)    SpO2 97%    BMI 38.36 kg/m2     Current Shift:  08/12 0701 - 08/12 1900  In: -   Out: 8541 [HDOFL:6362]  Last three shifts:  08/10 1901 - 08/12 0700  In: 600 [P.O.:600]  Out: 5650 [Urine:5650]    Exam:    General: Well developed, alert, NAD, OX3  Head/Neck: NCAT, supple, No masses, No lymphadenopathy  CVS:Regular rate and rhythm, no M/R/G, S1/S2 heard, no thrill  Lungs:Clear to auscultation bilaterally, no wheezes, rhonchi, or rales  Abdomen: Soft, Nontender, No distention, Normal Bowel sounds, No hepatomegaly  Extremities: No edema. Labs: Results:       Chemistry Recent Labs      08/12/18   0306  08/11/18   0145  08/10/18   0406   GLU  149*  118*  188*   NA  139  137  136   K  3.6  4.2  3.7   CL  103  98*  99*   CO2  35*  28  32   BUN  25*  22*  9   CREA  1.90*  2.70*  1.34*   CA  9.1  9.6  9.5   AGAP  1*  11  5   BUCR  13  8*  7*   AP   --    --   87   TP   --    --   7.6   ALB   --    --   1.6*   GLOB   --    --   6.0*   AGRAT   --    --   0.3*      CBC w/Diff Recent Labs      08/12/18   0306  08/10/18   0406   WBC  18.4*  16.1*   RBC  3.42*  3.96*   HGB  8.9*  10.5*   HCT  27.9*  32.3*   PLT  340  311   GRANS  80*  87*   LYMPH  12*  8*   EOS  2  0      Cardiac Enzymes No results for input(s): CPK, CKND1, ANJELICA in the last 72 hours. No lab exists for component: CKRMB, TROIP   Coagulation No results for input(s): PTP, INR, APTT in the last 72 hours. No lab exists for component: INREXT    Lipid Panel No results found for: CHOL, CHOLPOCT, CHOLX, CHLST, CHOLV, 959045, HDL, LDL, LDLC, DLDLP, 545489, VLDLC, VLDL, TGLX, TRIGL, TRIGP, TGLPOCT, CHHD, CHHDX   BNP No results for input(s): BNPP in the last 72 hours.    Liver Enzymes Recent Labs      08/10/18   0406   TP  7.6   ALB  1.6*   AP  87   SGOT  21      Thyroid Studies No results found for: T4, T3U, TSH, TSHEXT     Procedures/imaging: see electronic medical records for all procedures/Xrays and details which were not copied into this note but were reviewed prior to creation of Flori Grant MD

## 2018-08-12 NOTE — ROUTINE PROCESS
Bedside and Verbal shift change report given to JERRY Fischer RN (oncoming nurse) by JERRY Shoemaker RN (offgoing nurse). Report included the following information SBAR, Kardex, Intake/Output and MAR.

## 2018-08-12 NOTE — PROGRESS NOTES
Shift summary: Pt A&Ox4, pt had two BM during shift, belly less distended, appears to be more comfortable.

## 2018-08-12 NOTE — PROGRESS NOTES
Problem: Mobility Impaired (Adult and Pediatric)  Goal: *Acute Goals and Plan of Care (Insert Text)  Physical TherapyGoals   Initiated 8/7/2018 to be met within 7 days  1. Bed mobility: Rolling L to R to with mod A with use of HR for positioning. 2. Transfer: Supine to/from Sit with mod A with HR for change of position/prep for ambulation. 3. Activity Tolerance: Tolerate EOB sitting 5-10 minutes for ADL/balance activities. 4. Transfer: Sit to/from Stand with mod assist/RW in prep for ambulation. 4. Ambulation:  Ambulate 5-20 ft. max/mod A with RW for increased functional mobility. Outcome: Progressing Towards Goal  physical Therapy TREATMENT    Patient: Eusebio Anders (25 y.o. female)  Date: 8/12/2018  Diagnosis: Rhabdomyolysis  Lactic acidosis  Leg ulcer (HCC) Rhabdomyolysis  Precautions: Fall, Skin   Chart, physical therapy assessment, plan of care and goals were reviewed. ASSESSMENT:  Pt continues to require significant physical assistance with bed mobility. Initial static sitting balance is Poor as pt posterior leaning. Focused on anterior weight shifting to midline to safety. VCs to engage [id number] [place] [address] Road for support. Pt having increase difficulty using LUE. Decrease ROM and increase pain noted more today than previous tx. Nursing aware. Cont POC. Progression toward goals:  []      Improving appropriately and progressing toward goals  [x]      Improving slowly and progressing toward goals  []      Not making progress toward goals and plan of care will be adjusted     PLAN:  Patient continues to benefit from skilled intervention to address the above impairments. Continue treatment per established plan of care.   Discharge Recommendations:  Rehab  Further Equipment Recommendations for Discharge:  hospital bed     SUBJECTIVE:   Patient stated  I feel much better today     OBJECTIVE DATA SUMMARY:   Critical Behavior:  Neurologic State: Alert  Orientation Level: Oriented X4  Cognition: Appropriate decision making, Appropriate for age attention/concentration, Appropriate safety awareness, Follows commands  Safety/Judgement: Decreased insight into deficits  Functional Mobility Training:  Bed Mobility:  Rolling: Maximum assistance  Supine to Sit: Moderate assistance; Additional time; Adaptive equipment  Sit to Supine: Maximum assistance; Additional time; Adaptive equipment  Scooting: Total assistance  Balance:  Sitting: Impaired  Sitting - Static: Fair (occasional); Poor (constant support)  Sitting - Dynamic: Poor (constant support)    Therapeutic Exercises:         EXERCISE   Sets   Reps   Active Active Assist   Passive Self ROM   Comments   Ankle Pumps    [] [] [] []    Quad Sets/Glut Sets   [] [] [] []    Hamstring Sets   [] [] [] []    Short Arc Quads   [] [] [] []    Heel Slides   [] [] [] []    Straight Leg Raises   [] [] [] []    BUE flexion/extension hand, wrist and elbow  1 10 [x] [x] [] [] AA to L elbow flexion.     Long Arc Quads 2 10 [] [] [] []    Seated Marching 1 10 [] [] [] []    Standing Marching   [] [] [] []       [] [] [] []      Pain:  Pain Scale 1: Numeric (0 - 10)  Pain Intensity 1: 10  Pain Location 1: Leg  Pain Orientation 1: Left  Pain Description 1: Aching  Pain Intervention(s) 1: Medication (see MAR)  Activity Tolerance:   Fair    After treatment:   [] Patient left in no apparent distress sitting up in chair  [x] Patient left in no apparent distress in bed  [x] Call bell left within reach  [x] Nursing notified  [] Caregiver present  [] Bed alarm activated      Kenna Clark PTA   Time Calculation: 41 mins

## 2018-08-12 NOTE — PROGRESS NOTES
Vancomycin random level = 23.3 @ 0306 [id number]    SrCr= 1.90    CrCl= 32.4ml/min    Renal function    Scr may indicate improving renal function warranting earlier  redosing vs. rapidly rising Scr indicating ongoing TAMANNA- dose by level may be indicated        Vanc level dropped by 19 point in 24 hrs  New regimen of vancomycin :    Vancomycin 2000 mg IV q 36 hr starting @ 2100 12AUG 2018     Estimated Pharmacokinetic Parameters (based on population kinetics)  Vd: 78 L (0.7 L/kg)   Renny: 0.029 hr-1 (T1/2 = 23.9 hrs)     Dosing Recommendations   Vancomycin dose: 2000 mg IV Q36hrs (infused over 2 hrs)   Estimated peak: 38.6 mcg/mL   Estimated trough: 14.4 mcg/mL   Estimated AUC:TRACY: 592 mcg*hr/mL (assumed TRACY 1 mcg/mL)     A/P:   1. Recommend vancomycin 2000 mg IV Q36hrs (18 mg/kg)   2. Consider a vancomycin trough level prior to the 4th dose. 3. Please monitor renal function (urine output, BUN/SCr). Dose adjustments may be necessary with a significant change in renal function.

## 2018-08-13 VITALS
HEART RATE: 91 BPM | DIASTOLIC BLOOD PRESSURE: 72 MMHG | WEIGHT: 235.6 LBS | BODY MASS INDEX: 36.98 KG/M2 | RESPIRATION RATE: 16 BRPM | SYSTOLIC BLOOD PRESSURE: 156 MMHG | HEIGHT: 67 IN | TEMPERATURE: 98.6 F | OXYGEN SATURATION: 95 %

## 2018-08-13 LAB
ANION GAP SERPL CALC-SCNC: 2 MMOL/L (ref 3–18)
BUN SERPL-MCNC: 12 MG/DL (ref 7–18)
BUN/CREAT SERPL: 15 (ref 12–20)
CALCIUM SERPL-MCNC: 9.1 MG/DL (ref 8.5–10.1)
CHLORIDE SERPL-SCNC: 101 MMOL/L (ref 100–108)
CO2 SERPL-SCNC: 34 MMOL/L (ref 21–32)
CREAT SERPL-MCNC: 0.8 MG/DL (ref 0.6–1.3)
ERYTHROCYTE [DISTWIDTH] IN BLOOD BY AUTOMATED COUNT: 15.8 % (ref 11.6–14.5)
GLUCOSE BLD STRIP.AUTO-MCNC: 122 MG/DL (ref 70–110)
GLUCOSE BLD STRIP.AUTO-MCNC: 178 MG/DL (ref 70–110)
GLUCOSE SERPL-MCNC: 150 MG/DL (ref 74–99)
HCT VFR BLD AUTO: 26.5 % (ref 35–45)
HGB BLD-MCNC: 8.5 G/DL (ref 12–16)
MCH RBC QN AUTO: 26.5 PG (ref 24–34)
MCHC RBC AUTO-ENTMCNC: 32.1 G/DL (ref 31–37)
MCV RBC AUTO: 82.6 FL (ref 74–97)
PLATELET # BLD AUTO: 320 K/UL (ref 135–420)
PMV BLD AUTO: 9.1 FL (ref 9.2–11.8)
POTASSIUM SERPL-SCNC: 3.6 MMOL/L (ref 3.5–5.5)
RBC # BLD AUTO: 3.21 M/UL (ref 4.2–5.3)
SODIUM SERPL-SCNC: 137 MMOL/L (ref 136–145)
WBC # BLD AUTO: 13.5 K/UL (ref 4.6–13.2)

## 2018-08-13 PROCEDURE — 74011250636 HC RX REV CODE- 250/636: Performed by: INTERNAL MEDICINE

## 2018-08-13 PROCEDURE — 82962 GLUCOSE BLOOD TEST: CPT

## 2018-08-13 PROCEDURE — 74011636637 HC RX REV CODE- 636/637: Performed by: INTERNAL MEDICINE

## 2018-08-13 PROCEDURE — 85027 COMPLETE CBC AUTOMATED: CPT | Performed by: INTERNAL MEDICINE

## 2018-08-13 PROCEDURE — 36415 COLL VENOUS BLD VENIPUNCTURE: CPT | Performed by: INTERNAL MEDICINE

## 2018-08-13 PROCEDURE — 74011250637 HC RX REV CODE- 250/637: Performed by: HOSPITALIST

## 2018-08-13 PROCEDURE — 80048 BASIC METABOLIC PNL TOTAL CA: CPT | Performed by: INTERNAL MEDICINE

## 2018-08-13 PROCEDURE — 74011250637 HC RX REV CODE- 250/637: Performed by: INTERNAL MEDICINE

## 2018-08-13 PROCEDURE — 74011000258 HC RX REV CODE- 258: Performed by: INTERNAL MEDICINE

## 2018-08-13 RX ORDER — LINEZOLID 600 MG/1
600 TABLET, FILM COATED ORAL 2 TIMES DAILY
Qty: 8 TAB | Refills: 0 | Status: SHIPPED
Start: 2018-08-13 | End: 2018-08-17

## 2018-08-13 RX ORDER — AMLODIPINE BESYLATE 5 MG/1
5 TABLET ORAL DAILY
Status: DISCONTINUED | OUTPATIENT
Start: 2018-08-13 | End: 2018-08-13 | Stop reason: HOSPADM

## 2018-08-13 RX ORDER — AMLODIPINE BESYLATE 5 MG/1
5 TABLET ORAL DAILY
Qty: 30 TAB | Refills: 0 | Status: SHIPPED | OUTPATIENT
Start: 2018-08-13

## 2018-08-13 RX ADMIN — MULTIPLE VITAMINS W/ MINERALS TAB 1 TABLET: TAB at 08:15

## 2018-08-13 RX ADMIN — GABAPENTIN 100 MG: 100 CAPSULE ORAL at 08:14

## 2018-08-13 RX ADMIN — HEPARIN SODIUM 5000 UNITS: 5000 INJECTION INTRAVENOUS; SUBCUTANEOUS at 12:24

## 2018-08-13 RX ADMIN — LINEZOLID 600 MG: 600 INJECTION, SOLUTION INTRAVENOUS at 00:01

## 2018-08-13 RX ADMIN — HEPARIN SODIUM 5000 UNITS: 5000 INJECTION INTRAVENOUS; SUBCUTANEOUS at 06:12

## 2018-08-13 RX ADMIN — Medication 1 CAPSULE: at 08:15

## 2018-08-13 RX ADMIN — AMLODIPINE BESYLATE 5 MG: 5 TABLET ORAL at 12:25

## 2018-08-13 RX ADMIN — OMEPRAZOLE 20 MG: 20 CAPSULE, DELAYED RELEASE ORAL at 08:15

## 2018-08-13 RX ADMIN — INSULIN LISPRO 3 UNITS: 100 INJECTION, SOLUTION INTRAVENOUS; SUBCUTANEOUS at 12:24

## 2018-08-13 RX ADMIN — SODIUM CHLORIDE 1.5 G: 900 INJECTION INTRAVENOUS at 06:12

## 2018-08-13 NOTE — ROUTINE PROCESS
Bedside and Verbal shift change report given to ASHLEY Peterson (oncoming nurse) by JERRY Sepulveda RN (offgoing nurse). Report included the following information SBAR, Kardex, Intake/Output and MAR.

## 2018-08-13 NOTE — PROGRESS NOTES
D/C Plan: [facility] 8/13/18    Noted pt is medically stable for discharge. CM met with pt at bedside to discuss transition of care. Pt has been made aware of acceptance from [facility]. Pt has selected [facility]. Please arrange medical transport to [facility] at [facility] [address], [place], [place]. Report to [phone]. Pt will notify her family of transition plan. No other transition of care needs have been identified. Care Management Interventions  PCP Verified by CM:  Yes  Mode of Transport at Discharge: [facility]  Transition of Care Consult (CM Consult): SNF  [address]: No  Reason Outside [place]:  ([facility])  Partner SNF: Yes  Health Maintenance Reviewed: Yes  Physical Therapy Consult: Yes  Occupational Therapy Consult: Yes  Current Support Network: Lives Alone, Family Lives Nearby  Confirm Follow Up Transport: Family  Plan discussed with Pt/Family/Caregiver: Yes  Freedom of Choice Offered: Yes  Discharge Location  Discharge Placement: Skilled nursing facility ([facility])

## 2018-08-13 NOTE — PROGRESS NOTES
Reviewed d/c instructions with pt. Pt verbalized understanding. PIV and martinez removed, prior to d/c. Wound vac also switched to pt's wound vac from home. Pt tolerated well. D/C packet given to medical transporters. Report called Penny at [facility]. Pt left the unit via stretcher with medical transporters.

## 2018-08-13 NOTE — WOUND CARE
Wound care in to see patient and change the hospital wound vac over to patients home vac for discharge. Vac's changed out and hospital vac returned to wound care.

## 2018-08-13 NOTE — DISCHARGE SUMMARY
Discharge Summary    Patient: Eusebio Anders MRN: [id number]  CSN: [id number]    YOB: 1940  Age: 70 y.o. Sex: female    DOA: 8/3/2018 LOS:  LOS: 9 days   Discharge Date:      Primary Care Provider:  Honore Holstein, MD    Admission Diagnoses: Rhabdomyolysis  Lactic acidosis  Leg ulcer ([facility]    Discharge Diagnoses:    Problem List as of 8/13/2018  Date Reviewed: 8/4/2018          Codes Class Noted - Resolved    Venous insufficiency ICD-10-CM: I87.2  ICD-9-CM: 459.81  Unknown - Present        HTN (hypertension) ICD-10-CM: I10  ICD-9-CM: 401.9  Unknown - Present        Hypercholesteremia ICD-10-CM: E78.00  ICD-9-CM: 272.0  Unknown - Present        DM2 (diabetes mellitus, type 2) ([facility] ICD-10-CM: E11.9  ICD-9-CM: 250.00  Unknown - Present        Chronic ulcer of left lower extremity with fat layer exposed ([facility] ICD-10-CM: L96.754  ICD-9-CM: 707.10  Unknown - Present        Cellulitis ICD-10-CM: L03.90  ICD-9-CM: 682.9  Unknown - Present        * (Principal)Rhabdomyolysis ICD-10-CM: M62.82  ICD-9-CM: 728.88  8/4/2018 - Present        Lactic acidosis ICD-10-CM: E87.2  ICD-9-CM: 276.2  8/4/2018 - Present        Leg ulcer ([facility] ICD-10-CM: G65.763  ICD-9-CM: 707.10  8/4/2018 - Present              Discharge Medications:     Current Discharge Medication List      START taking these medications    Details   amLODIPine (NORVASC) 5 mg tablet Take 1 Tab by mouth daily. Qty: 30 Tab, Refills: 0      linezolid (ZYVOX) 600 mg tablet Take 1 Tab by mouth two (2) times a day for 4 days. Qty: 8 Tab, Refills: 0         CONTINUE these medications which have NOT CHANGED    Details   trospium (SANCTURA XL) 60 mg capsule Take 60 mg by mouth Daily (before breakfast). simvastatin (ZOCOR) 20 mg tablet Take 20 mg by mouth nightly. aspirin delayed-release 81 mg tablet Take 81 mg by mouth daily. gabapentin (NEURONTIN) 100 mg capsule Take 100 mg by mouth three (3) times daily.       glipiZIDE SR (GLUCOTROL XL) 10 mg CR tablet Take 10 mg by mouth daily. multivitamin (ONE A DAY) tablet Take 1 Tab by mouth daily. omeprazole (PRILOSEC) 20 mg capsule Take 20 mg by mouth daily. polyethylene glycol (MIRALAX) 17 gram/dose powder Take 17 g by mouth daily. SITagliptin (JANUVIA) 100 mg tablet Take 100 mg by mouth daily. Fish Oil-Vit E-Fat TUES9- (SUPER OMEGA-3) 400-5 mg-unit cap Take 1 Tab by mouth daily. STOP taking these medications       telmisartan (MICARDIS) 80 mg tablet Comments:   Reason for Stopping:         HYDROcodone-acetaminophen (XODOL) 5-300 mg tablet Comments:   Reason for Stopping:         hydroCHLOROthiazide (HYDRODIURIL) 25 mg tablet Comments:   Reason for Stopping:         trospium (SANCTURA) 20 mg tablet Comments:   Reason for Stopping:               Discharge Condition: Good            PHYSICAL EXAM   Visit Vitals    /84 (BP 1 Location: Right arm, BP Patient Position: At rest)    Pulse 91    Temp 98.6 °F (37 °C)    Resp 16    Ht 5' 7\" (1.702 m)    Wt 106.9 kg (235 lb 9.6 oz)    SpO2 95%    BMI 36.9 kg/m2     General: Awake, cooperative, no acute distress    HEENT: NC, Atraumatic. PERRLA, EOMI. Anicteric sclerae. Lungs:  CTA Bilaterally. No Wheezing/Rhonchi/Rales. Heart:  Regular  rhythm,  No murmur, No Rubs, No Gallops  Abdomen: Soft, Non distended, Non tender. +Bowel sounds,   Extremities: Left leg wound with unna boot wrapped. Psych:   Not anxious or agitated. Neurologic:  No acute neurological deficits. Admission HPI :   Demetrio House is a 70 y.o.  female who has chronic wound vac and leg ulcer presents to ER after sustaining a fall and got stuck in comode  And fell to floor covered in stool for 1.day Patient attempted to get up but could not and forgot she was wearing life line Patient brought in via EMS covered in stools was unable to walk / felt sore all over Found to have elevaated lactate and CPK. Patient is followed by [address] [facility] and initially wanted transfer to [facility] but after waiting for some of her test to come back decided she wanted to stay here She has chronic leg wound ulcer for last 2 year with recent debridement about one week ago.  [place] [facility] :   Patient admitted to medical floor, she was seen by wound care. Cellulitis with left leg ulcer - she was started on broad spectrum antibiotics including vancomycin, levafloxacin, ceftriaxone. She received local wound care during hospitalization and her wound vac continued. She had wound vac placed by [facility] wound care at [facility] after debridement. Her wound cultures showed growth of multiple organisms including VRE, proteus mirabilis, pseudomonas, enterococcus faecalis, beta hemolytic strep. She received total 10 days of antibiotics. Her vancomycin stopped and switched to zyvox. At discharge will discharge on zyvox for 4 more days. Continue with local wound care, wound vac and follow up with wound care. Rhabdomyolysis - resolved, secondary to cellulitis and wound. TAMANNA - likely pre renal vs vancomycin associated. She was also on micardis and hctz. Both stopped, her renal function improved and her micardis and HCTZ discontinued. She is started on norvasc for her BP and recommend medication changes as needed. Avoid nephrotoxins. Constipation - resolved. Received enema and dulcolax. DM - her oral hypoglycemics held and she was on lantus and SSI. Will resume her oral hypoglycemics at discharge. Patient worked with PT/OT and she will be discharged to rehab. Recommended keeping legs elevated.          Activity: Activity as tolerated    Diet: Diabetic Diet    Follow-up: PCP, wound care    Disposition: rehab    Minutes spent on discharge: 55       Labs: Results:       Chemistry Recent Labs      08/13/18   0425  08/12/18   0306  08/11/18   0145   GLU  150*  149*  118*   NA  137  139  137   K  3.6  3.6  4.2   CL 101  103  98*   CO2  34*  35*  28   BUN  12  25*  22*   CREA  0.80  1.90*  2.70*   CA  9.1  9.1  9.6   AGAP  2*  1*  11   BUCR  15  13  8*      CBC w/Diff Recent Labs      08/13/18   0425  08/12/18   0306   WBC  13.5*  18.4*   RBC  3.21*  3.42*   HGB  8.5*  8.9*   HCT  26.5*  27.9*   PLT  320  340   GRANS   --   80*   LYMPH   --   12*   EOS   --   2      Cardiac Enzymes No results for input(s): CPK, CKND1, ANJELICA in the last 72 hours. No lab exists for component: CKRMB, TROIP   Coagulation No results for input(s): PTP, INR, APTT in the last 72 hours. No lab exists for component: INREXT    Lipid Panel No results found for: CHOL, CHOLPOCT, CHOLX, CHLST, CHOLV, 421792, HDL, LDL, LDLC, DLDLP, 452457, VLDLC, VLDL, TGLX, TRIGL, TRIGP, TGLPOCT, CHHD, CHHDX   BNP No results for input(s): BNPP in the last 72 hours. Liver Enzymes No results for input(s): TP, ALB, TBIL, AP, SGOT, GPT in the last 72 hours. No lab exists for component: DBIL   Thyroid Studies No results found for: T4, T3U, TSH, TSHEXT         Significant Diagnostic Studies: Xr Chest Port    Result Date: 8/8/2018  --------------------------------------------------------------------------- <<<<<<<<<           [address] [address] [facility]           >>>>>>>>> --------------------------------------------------------------------------- CLINICAL HISTORY:  Fever. COMPARISON EXAMINATIONS:  August 3, 2018. ---  SINGLE FRONTAL VIEW OF THE CHEST  --- The lung volumes are low and the patient is rotated. The cardiomediastinal silhouette is stable. There is pulmonary vascular congestion. There are faint lung base opacities. No definitive significant pleural effusions. There are no definitive significant pleural effusions. No significant osseous abnormalities are identified.  --------------    Impression: -------------- Examination is limited due to rotation and low lung volumes. There appears to be mild pulmonary vascular congestion.  Bilateral lung base opacities may be partly technical due to the poor inspiratory effort. Findings could also represent atelectasis and/or infiltrates. Xr Chest Port    Result Date: 8/4/2018  --------------------------------------------------------------------------- <<<<<<<<<           [facility]  [facility]           >>>>>>>>> --------------------------------------------------------------------------- CLINICAL HISTORY:  Weakness. COMPARISON EXAMINATIONS:  None. ---  SINGLE FRONTAL VIEW OF THE CHEST  --- The patient is rotated and the lung volumes are low. The cardiomediastinal silhouette is within normal limits. There is no focal consolidation or pleural effusion. No significant osseous abnormalities are identified.  --------------    Impression: -------------- Low lung volumes, portable technique and rotation limits evaluation. No active pulmonary disease identified. No results found for this or any previous visit.         CC: Honore Holstein, MD

## 2018-08-13 NOTE — PROGRESS NOTES
Shift summary: Pt A&Ox4, pain managed with po Norco, pt had small BM per bedpan, martinez draining clear, yellow, urine. Appears to be resting comfortably. No visible signs of distress.

## 2018-08-17 LAB
BACTERIA SPEC CULT: NORMAL
SERVICE CMNT-IMP: NORMAL

## 2018-09-02 LAB
ATRIAL RATE: 116 BPM
CALCULATED P AXIS, ECG09: 79 DEGREES
CALCULATED R AXIS, ECG10: 17 DEGREES
CALCULATED T AXIS, ECG11: 41 DEGREES
DIAGNOSIS, 93000: NORMAL
P-R INTERVAL, ECG05: 168 MS
Q-T INTERVAL, ECG07: 304 MS
QRS DURATION, ECG06: 72 MS
QTC CALCULATION (BEZET), ECG08: 422 MS
VENTRICULAR RATE, ECG03: 116 BPM

## 2018-10-25 ENCOUNTER — HOSPITAL ENCOUNTER (OUTPATIENT)
Dept: WOUND CARE | Age: 78
Discharge: HOME | End: 2018-10-25
Payer: MEDICARE

## 2018-10-25 PROBLEM — L97.822 SKIN ULCER OF POPLITEAL REGION, LEFT, WITH FAT LAYER EXPOSED (HCC): Status: ACTIVE | Noted: 2018-10-25

## 2018-10-25 PROBLEM — L97.812 ULCER OF RIGHT PRETIBIAL REGION, WITH FAT LAYER EXPOSED (HCC): Status: ACTIVE | Noted: 2018-10-25

## 2018-10-25 PROCEDURE — 87076 CULTURE ANAEROBE IDENT EACH: CPT | Performed by: PODIATRIST

## 2018-10-25 PROCEDURE — 87185 SC STD ENZYME DETCJ PER NZM: CPT | Performed by: PODIATRIST

## 2018-10-25 PROCEDURE — 87077 CULTURE AEROBIC IDENTIFY: CPT | Performed by: PODIATRIST

## 2018-10-25 PROCEDURE — 87075 CULTR BACTERIA EXCEPT BLOOD: CPT | Performed by: PODIATRIST

## 2018-10-25 PROCEDURE — 29580 STRAPPING UNNA BOOT: CPT

## 2018-10-25 PROCEDURE — 87070 CULTURE OTHR SPECIMN AEROBIC: CPT | Performed by: PODIATRIST

## 2018-10-25 PROCEDURE — 87186 SC STD MICRODIL/AGAR DIL: CPT | Performed by: PODIATRIST

## 2018-10-25 NOTE — DISCHARGE INSTRUCTIONS
Routine, WEEKLY, First occurrence on u 10/25/18 at 1645, For 3 weeks, Cleanse wound with NS or soap and water or commercial wound cleanser Apply the following topically to wound bed: Dakins wet to dry bilateral, absorptive dressing Apply the following dressings: Unna boots bilateral For Home Care/Self Care Frequency: Twice weekly Patient to return for wound care in: 11/8/18 Days PLEASE CONTACT OFFICE AS SOON AS POSSIBLE IF UNABLE TO MAKE THIS APPOINTMENT. Inspect your wounds, looking for signs of infection which may include the following:  Increase in redness  Red \"streaks\" from wound  Increase in swelling  Fever  Unusual odor  Change in the amount of wound drainage. Should you experience any significant changes in your wound(s) or have any questions regarding your home care instructions please contact the wound center or your home health company. If after regular business hours, please call your family doctor or local emergency room. Edema Control: Elevate legs as much as possible. Avoid standing in one position for more than 10 minutes. Avoid setting with legs down. Do not cross legs while sitting. Off-Loading: Frequent position changes. Do not cross legs while sitting. Shift weight every 20 minutes or more when sitting for prolonged periods of time.

## 2018-10-25 NOTE — WOUND CARE
10/25/18 1534   Wound Leg Lower Left;Lateral   Date First Assessed/Time First Assessed: 10/25/18 1530   POA: Yes  Wound Type: Venous  Location: Leg Lower  Orientation: Left;Lateral   DRESSING STATUS Clean, dry, and intact   DRESSING TYPE (kerlix, gauze)   Wound Length (cm) 11 cm   Wound Width (cm) 12 cm   Wound Depth (cm) 1   Wound Surface area (cm^2) 132 cm^2   Condition of Edges Rolled/curled   Tissue Type Percent Pink 75   Tissue Type Percent Yellow 25   Drainage Amount  Small    Drainage Color Serosanguinous   Wound Odor Mild   Periwound Skin Condition Edematous   Cleansing and Cleansing Agents  Other (comment)  (vashe)   Dressing Type Applied Unna boot  (aquacell ag, exudry)   Procedure Tolerated Well   Wound Leg Lower Right; Anterior;Superior   Date First Assessed/Time First Assessed: 10/25/18 1533   POA: Yes  Wound Type: Venous  Location: Leg Lower  Orientation: Right; Anterior;Superior   DRESSING STATUS Clean, dry, and intact   DRESSING TYPE Other (Comment)  (kerlix, gauze)   Wound Length (cm) 4.3 cm   Wound Width (cm) 7 cm   Wound Depth (cm) 0.6   Wound Surface area (cm^2) 30.1 cm^2   Condition of Base Slough   Condition of Edges Open   Tissue Type Percent Yellow 100   Drainage Amount  Moderate   Drainage Color Serous   Wound Odor None   Periwound Skin Condition Edematous   Cleansing and Cleansing Agents  Other (comment)  (vashe)   Dressing Type Applied Unna boot  (aquacell ag, exudry )   Procedure Tolerated Well   Wound Leg Lower Right; Anterior; Inferior   Date First Assessed/Time First Assessed: 10/25/18 1533   POA: Yes  Wound Type: Venous  Location: Leg Lower  Orientation: Right; Anterior; Inferior   DRESSING STATUS Intact   DRESSING TYPE Other (Comment)  (kerlix, 4x4)   Non-Pressure Injury Full thickness (subcut/muscle)   Wound Length (cm) 4 cm   Wound Width (cm) 4 cm   Wound Depth (cm) 0.2   Wound Surface area (cm^2) 16 cm^2   Tissue Type Percent Yellow 100   Drainage Amount  Moderate   Drainage Color Serous   Wound Odor None   Periwound Skin Condition Edematous   Cleansing and Cleansing Agents  Other (comment)  (vashe)   Dressing Type Applied Unna boot  ([place] ag, exudry)   Procedure Tolerated Well

## 2018-10-25 NOTE — PROGRESS NOTES
Patient presents to wound clinic for: Patient is here for cc of left and right leg ulcerations. The left leg ulcer has been present > 1.5 years. She had been treated all this time at the [facility] wound care [place]. No improvement and getting worse she claims. No chills, SOB nor chest pains. Pertinent Medical History: Rhabdomyolysis, lactic acidosis, leg ulcers, venous insufficiency, hypertension, hypercholesteremia, Type 2 diabetes, cellulitis    Wound Description:   Wound Type: Large defect wound on left lateral leg proximal to the lateral malleolus(11cm x 12 cm x 1cm , right leg ulcers 4cm x 4cm x .2cm inferior ulcer       (4.3cm x 7cm x .6cm ) superior to each other. Indication: Chronic >30days   Status: [initial]   Tissue Type:   Epithelial: [without necrosis]  Granulation: [without necrosis]  Subcutaneous: without necrosis  Slough: [NA]  Eschar: [NA]  Tendon: [without necrosis]  Fascia: [without necrosis]  Muscle: [without necrosis]  Bone: without necrosis  Gangrene: [yes]   Drainage: mild      Debridement:none    Infection:possible  Type: local  Signs and Symptoms:mild erythema, chronic wound, foul odor, mild calor, neg celluliits  Recent Would Culture results: taken today  Recent Bone Culture results: none  Recent Would biopsy results: none  Recent Bone biopsy results: none  Radiological studies results: none  Antibiotic:   Oral: Starting augment today        Edema: [yes]   Status:initial visit   Edema is being managed with: apul smyth   Patient educated on the importance of edema control [Yes]      Lower Extremity Circulation   Signs and Symptoms: palpable pedal pulses     Compression: [yes]   Status: [continue]   Compliant:to be determined    Neuropathy:decreased sensation    Offloading: NA        Nicotine/Tobacco Use: [Yes]     Nutrition:  Status WNL  Diabetic [ type 2]  Glucose Control: unknown    Plan: Patient is seen today for full  Evaluation and treatment of ulcers on both legs.  They are chronic in nature. I discussed treatment options. We applied calcium ag and ordered dakins solution 1/4 st to be applied by home health nurses bid per week. F/u in 2 weeks. Also, wrote for augmentin 500mg.      Additional Information: [free text]

## 2018-10-29 LAB
BACTERIA SPEC CULT: ABNORMAL
GRAM STN SPEC: ABNORMAL
SERVICE CMNT-IMP: ABNORMAL

## 2018-10-30 LAB
BACTERIA SPEC CULT: NORMAL
BACTERIA SPEC CULT: NORMAL
SERVICE CMNT-IMP: NORMAL
SERVICE CMNT-IMP: NORMAL

## 2018-11-06 ENCOUNTER — OFFICE VISIT (OUTPATIENT)
Dept: VASCULAR SURGERY | Age: 78
End: 2018-11-06

## 2018-11-06 VITALS
DIASTOLIC BLOOD PRESSURE: 72 MMHG | BODY MASS INDEX: 36.88 KG/M2 | HEIGHT: 67 IN | RESPIRATION RATE: 17 BRPM | WEIGHT: 235 LBS | HEART RATE: 80 BPM | SYSTOLIC BLOOD PRESSURE: 130 MMHG

## 2018-11-06 DIAGNOSIS — L97.201 VENOUS STASIS ULCER OF CALF LIMITED TO BREAKDOWN OF SKIN WITH VARICOSE VEINS, UNSPECIFIED LATERALITY (HCC): ICD-10-CM

## 2018-11-06 DIAGNOSIS — I83.002 VENOUS STASIS ULCER OF CALF LIMITED TO BREAKDOWN OF SKIN WITH VARICOSE VEINS, UNSPECIFIED LATERALITY (HCC): ICD-10-CM

## 2018-11-06 DIAGNOSIS — I87.2 VENOUS (PERIPHERAL) INSUFFICIENCY: Primary | ICD-10-CM

## 2018-11-06 NOTE — PROGRESS NOTES
[facility] Chart reviewed for the following: 
 Ricardo DEGROOT LPN, have reviewed the medications and updated the Allergic reactions for Banks Aw TIME OUT performed immediately prior to start of procedure: 
 Ricardo DEGROOT LPN, have performed the following reviews on Banks Aw prior to the start of the procedure: 
         
* Patient was identified by name and date of birth * Agreement that eloy boot removed and reapplied * Procedure site verified * Patient was positioned for comfort * Verbal consent was given by patient Time: 10:45 Date of procedure: 11/6/2018 Procedure performed by:  Lovey Klinefelter, PA How tolerated by patient: tolerated the procedure well with no complications Comments: Cleansed bilateral lower extremities with wound cleanser and gauze, patient tolerated well but had slight discomfort. Applied solosite gel, aquacel ag to open wounds to lateral lower extremities, covered with ABDs, unna boot and coban, patient tolerated well. Right lateral lower leg measures: 8.7x9.0x0.3 cm and left lateral lower leg measures: 11.0x12.7x0.3 cm.

## 2018-11-06 NOTE — PROGRESS NOTES
1. Have you been to an emergency room or urgent care clinic since your last visit? NO Hospitalized since your last visit? If yes, where, when, and reason for visit? NO 
2. Have you seen or consulted any other health care providers outside of the [facility] since your last visit including any procedures, health maintenance items. If yes, where, when and reason for visit?  NO

## 2018-11-07 NOTE — PROGRESS NOTES
[facility] [place] Chief Complaint Patient presents with  New Patient  Swelling  Leg Pain  Wound Check History and Physical   
Ms Cozette Felty is a new patient referred by the [facility] wound care clinic She has been seen by another vascular practice and wound center on the [facility] for nearly 18 months with limited improvement in the wound She ended up at [facility] as an inpatient after a fall and then they ended up following her as an outpatient in conjunction with home health. They suggested new evaluation She does have history of venous insufficiency and has had remote stripping and ablations She has recent arterial studies which were wnl No dvt history but a venous doppler earlier this year still described areas of reflux but it was not a dedicated reflux study She does have compression wraps as part of her wound care She has had antibiotics on and off for infection At one point she was in wound vac At the other outpatient clinic she had synthetic skin grafts but had never been taken to OR for any treatments Currently the wounds do have some pain and mild drainage She is still ambulatory Past Medical History:  
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  
Diagnosis Code  Rhabdomyolysis M62.82  
 Lactic acidosis E87.2  Leg ulcer ([address] L97.909  Venous insufficiency I87.2  
 HTN (hypertension) I10  
 Hypercholesteremia E78.00  
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  
 Cellulitis L03.90  Chronic venous hypertension with ulcer involving left side ([address] I87.312, L97.929  
  Ulcer of right pretibial region, with fat layer exposed ([address] L97.812  
 Skin ulcer of popliteal region, left, with fat layer exposed ([address] Q84.063 History reviewed. No pertinent surgical history. Current Outpatient Medications Medication Sig Dispense Refill  amLODIPine (NORVASC) 5 mg tablet Take 1 Tab by mouth daily. 30 Tab 0  
 trospium (SANCTURA XL) 60 mg capsule Take 60 mg by mouth Daily (before breakfast).  simvastatin (ZOCOR) 20 mg tablet Take 20 mg by mouth nightly.  aspirin delayed-release 81 mg tablet Take 81 mg by mouth daily.  gabapentin (NEURONTIN) 100 mg capsule Take 100 mg by mouth three (3) times daily.  glipiZIDE SR (GLUCOTROL XL) 10 mg CR tablet Take 10 mg by mouth daily.  multivitamin (ONE A DAY) tablet Take 1 Tab by mouth daily.  omeprazole (PRILOSEC) 20 mg capsule Take 20 mg by mouth daily.  polyethylene glycol (MIRALAX) 17 gram/dose powder Take 17 g by mouth daily.  SITagliptin (JANUVIA) 100 mg tablet Take 100 mg by mouth daily.  Fish Oil-Vit E-Fat BNMF1- (SUPER OMEGA-3) 400-5 mg-unit cap Take 1 Tab by mouth daily. Allergies Allergen Reactions  Codeine Rash and Itching  Percocet [Oxycodone-Acetaminophen] Nausea and Vomiting  Tramadol Rash and Itching Social History Socioeconomic History  Marital status: SINGLE Spouse name: Not on file  Number of children: Not on file  Years of education: Not on file  Highest education level: Not on file Social Needs  Financial resource strain: Not on file  Food insecurity - worry: Not on file  Food insecurity - inability: Not on file  Transportation needs - medical: Not on file  Transportation needs - non-medical: Not on file Occupational History  Not on file Tobacco Use  Smoking status: Never Smoker  Smokeless tobacco: Never Used Substance and Sexual Activity  Alcohol use: Not on file  Drug use: Not on file  Sexual activity: Not on file Other Topics Concern  Not on file Social History Narrative  Not on file History reviewed. No pertinent family history. Review of Systems Review of Systems - History obtained from the patient General ROS: negative Psychological ROS: negative Ophthalmic ROS: negative Respiratory ROS: negative Cardiovascular ROS: negative Gastrointestinal ROS: negative Musculoskeletal ROS: negative Neurological ROS: negative Dermatological ROS: per HPI Vascular ROS: per HPI Physical Exam:   
Visit Vitals /72 (BP 1 Location: Left arm, BP Patient Position: Sitting) Pulse 80 Resp 17 Ht 5' 7\" (1.702 m) Wt 235 lb (106.6 kg) BMI 36.81 kg/m² General:  Alert, cooperative, no distress. Head:  Normocephalic, without obvious abnormality, atraumatic. Eyes: 
  Conjunctivae/corneas clear. Pupils equal, round, reactive to light. Extraocular movements intact. Extremities:  mild lower leg edema. Scattered small varicose veins seen in both legs Pulses: Palpable and no signs of arterial insufficiency Skin: Right leg wound is more anterior shin and is about 9x9cm; left is lateral wound and 11 x 12 cm. Both have some mixed granulation and slough. Some slight bleeding occurred with removal of the wet to dry dressing Impression and Plan: 1. Venous (peripheral) insufficiency 2. Venous stasis ulcer of calf limited to breakdown of skin with varicose veins, unspecified laterality ([address] No orders of the defined types were placed in this encounter. Discussed the vascular component with history of venous disease. Though she has had treatments in the past there is suggestion from a study done recently that there is still areas of reflux. It may be worth dedicated reflux study to see if any further treatments available. If so this can reduce venous congestion/pressure that can allow better wound healing. It also may be valuable to consider outpatient surgical debridement of wounds and attempt at an alternate skin graft with good results for wounds such as hers and would suggest A Cell. I explained the follow up involved and in fact can try to coordinate it such that her post op visit for the dressing change can hopefully be same day as reflux study Will call her back for scheduling details and attempt to arrange this with Dr Penny Gary at her request 
 
[place] Portions of this note have been created using voice recognition software.

## 2018-11-08 ENCOUNTER — HOSPITAL ENCOUNTER (OUTPATIENT)
Dept: WOUND CARE | Age: 78
Discharge: HOME | End: 2018-11-08
Payer: MEDICARE

## 2018-11-08 VITALS
DIASTOLIC BLOOD PRESSURE: 81 MMHG | HEART RATE: 92 BPM | TEMPERATURE: 97.9 F | RESPIRATION RATE: 16 BRPM | SYSTOLIC BLOOD PRESSURE: 165 MMHG | OXYGEN SATURATION: 100 %

## 2018-11-08 PROBLEM — L08.9 SKIN INFECTION: Status: ACTIVE | Noted: 2018-11-08

## 2018-11-08 PROCEDURE — 29580 STRAPPING UNNA BOOT: CPT

## 2018-11-08 NOTE — DISCHARGE INSTRUCTIONS
Patient to return for wound care in: [facility] IF UNABLE TO MAKE THIS APPOINTMENT. Inspect your wounds, looking for signs of infection which may include the following:  Increase in redness  Red \"streaks\" from wound  Increase in swelling  Fever  Unusual odor  Change in the amount of wound drainage. Should you experience any significant changes in your wound(s) or have any questions regarding your home care instructions please contact the wound center or your home health company. If after regular business hours, please call your family doctor or local emergency room. Edema Control:   Elevate legs as much as possible. Avoid standing in one position for more than 10 minutes. Avoid setting with legs down. Do not cross legs while sitting. Off-Loading:     Frequent position changes. Do not cross legs while sitting. Shift weight every 20 minutes or more when sitting for prolonged periods of time.

## 2018-11-08 NOTE — WOUND CARE
11/08/18 1431   Wound Leg Lower Left;Lateral   Date First Assessed/Time First Assessed: 10/25/18 1530   POA: Yes  Wound Type: Venous  Location: Leg Lower  Orientation: Left;Lateral   DRESSING STATUS Intact   DRESSING TYPE Unna boot  (alginate)   Wound Length (cm) 11 cm   Wound Width (cm) 12 cm   Wound Depth (cm) 0.6   Wound Surface area (cm^2) 132 cm^2   Change in Wound Size % 0   Condition of Base Granulation;Slough   Condition of Edges Rolled/curled   Tissue Type Yellow;Pink   Tissue Type Percent Pink 50   Tissue Type Percent Yellow 50   Drainage Amount  Large   Drainage Color Yellow   Wound Odor Strong   Periwound Skin Condition Macerated   Cleansing and Cleansing Agents  Other (comment)  (vashe)   Dressing Type Applied Unna boot  (ricky, mextra, mesalt, gentamycin )   Wound Procedure Type Other  (debridement of biofilm)   Procedure Time Out 1520   Consent Obtained  Yes   Procedure Instrument blade #10   Procedure Bleeding Yes   Hemostasis yes   Post-Procedure Length (cm) 11 cm   Post-Procedure Width (cm) 12 cm   Post-Procedure Depth (cm) 0.6 cm   Post-Procedure Volume (cm^3) 79.2 cm^3   Post-Procedure Surface Area (cm^2) 132 cm^2   Procedure Tolerated Well   Wound Leg Lower Right; Anterior; Inferior   Date First Assessed/Time First Assessed: 10/25/18 1533   POA: Yes  Wound Type: Venous  Location: Leg Lower  Orientation: Right; Anterior; Inferior   DRESSING STATUS Intact   DRESSING TYPE Unna boot   Wound Length (cm) 8 cm   Wound Width (cm) 8.5 cm   Wound Depth (cm) 0.3   Wound Surface area (cm^2) 68 cm^2   Change in Wound Size % -325   Condition of Base Slough;Pink   Condition of Edges Rolled/curled   Tissue Type Percent Pink 25   Tissue Type Percent Yellow 75   Drainage Amount  Large   Drainage Color Serosanguinous   Wound Odor Mild   Periwound Skin Condition Intact   Cleansing and Cleansing Agents  Other (comment)  (vashe)   Dressing Type Applied Unna boot  (ricky, mextra, mesalt, gent)   Wound Procedure Type Other  (debridement of biofilm )   Procedure Time Out 1520   Consent Obtained  Yes   Procedure Instrument blade #10    Procedure Bleeding Yes   Hemostasis yes   Post-Procedure Length (cm) 8 cm   Post-Procedure Width (cm) 8.5 cm   Post-Procedure Depth (cm) 0.3 cm   Post-Procedure Volume (cm^3) 20.4 cm^3   Post-Procedure Surface Area (cm^2) 68 cm^2   Procedure Tolerated Well

## 2018-11-08 NOTE — WOUND CARE
[facility]  Follow up note. Chief Complaint:  Radha Disla is a 70 y.o.  female who presents for follow up of chronic bilateral leg venous stasis ulceration. She has not taken my antibiotics that were prescribed, however she will start today. She told me that her left leg was aggressively debrided while at [facility], however it did not seem to be helpful. Review of systems  General: No fevers or chills. Cardiovascular: No chest pain or pressure. No palpitations. Pulmonary: No shortness of breath. Gastrointestinal: No nausea, vomiting. Derm: See above      Physical Exam:     Visit Vitals  /81 (BP Patient Position: Sitting)   Pulse 92   Temp 97.9 °F (36.6 °C)   Resp 16   SpO2 100%     General: well developed, well nourished, pleasant , NAD. Hygiene good  Psych: cooperative. Pleasant. No anxiety or depression. Normal mood and affect. Neuro: alert and oriented to person/place/situation. Otherwise nonfocal.  Derm: Skin turgor normal for age, dry skin  HEENT: Normocephalic, atraumatic. EOMI. Conjunctiva clear. No scleral icterus. Chest: Good air entry bilaterally. Respirations non labored  Cardio[de-identified] Normal heart sounds,no rubs, murmurs or gallops  Abdomen: Soft, nontender, nondistended, normoactive bowel sounds  Lower extremities: color normal; temperature normal. Calves are supple, nontender. Capillary refill <3 sec  Focussed Lower Extremity Exam:  Vascular exam:  RIGHT: +mid leg ulceration with slough, discharge, mild foul odor, hyperpigmentation, fibrous tissue pandey 2/3, +hyperpigmentation surrounding the ulcer and painful upon debridement compared to decreased sensation on the left lower extremity  LEFT lower extremity leg ulcer ulceration larger than right leg ulcer with similar tissue, but increased granulation tissue compared to the right lower leg ulceration and mild foul odor.  Decreased sensation  DP pulse : palpable  PT pulse: palpable  Nails dystrophic RIGHT lower extremity with severe non pitting edema  LEFT: lower extremity: severe non pitting edema  DP pulse : palpable   PT pulse:palpable  Nails dystrophic    Wound Description:Left/Right     Wound Length: 11 cm, 8 cm    Wound Width :12 cm, 8.5 cm    Wound Depth :0.6, 0.3    Etiology: Venous stasis  Ulcer bed: fibrous tissue, granulation tissue  Periwound:hyperpigmentation and edema  Exudate: primarily serous exudate    Data Review:   No results found for this or any previous visit (from the past 24 hour(s)). See prior wound culture results. 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 female with chronic bilateral leg ulcerations    Needs :  Serial debridement- debrided today- see note below  Good local wound care  Edema management  Nutrition optimization  Good Diabetic control  Plan:     In wound care clinic today:Applied topical anesthetic and used #10 blade for debridement  Cleanse wound with NS or soap and water or commercial wound cleanser  Apply the following topically to wound bed:  Apply the following to stef-wound: NA  Apply the following dressings: Absorptive dressing, mesalt, gentamicin ointment and unna boot    Leave dressings in place until next visit    Patient to return for wound care in:  7 Days  Follow up with Nurse visit as recommended. PLEASE CONTACT OFFICE AS SOON AS POSSIBLE IF UNABLE TO MAKE THIS APPOINTMENT. Inspect your wounds, looking for signs of infection which may include the following:  Increase in redness  Red \"streaks\" from wound  Increase in swelling  Fever  Unusual odor  Change in the amount of wound drainage. Should you experience any significant changes in your wound(s) or have any questions regarding your home care instructions please contact the wound center or your home health company. If after regular business hours, please call your family doctor or local emergency room. Edema Control:   Elevate legs as much as possible. Avoid standing in one position for more than 10 minutes. Avoid setting with legs down. Do not cross legs while sitting. Off-Loading:     Frequent position changes. Do not cross legs while sitting. Shift weight every 20 minutes or more when sitting for prolonged periods of time.     Signed By: Marcia Rodriguez DPM     November 8, 2018

## 2018-11-13 ENCOUNTER — DOCUMENTATION ONLY (OUTPATIENT)
Dept: VASCULAR SURGERY | Age: 78
End: 2018-11-13

## 2018-11-13 NOTE — PROGRESS NOTES
11/13/18 spoke to patient she states that she does not want to schedule a debridement at this time. Dr Eric Yousif at the wound clinic did a debridement on 11/8/18. Dr Eric Yousif does not think that the patient needs that extensive a debridement.

## 2018-11-15 ENCOUNTER — HOSPITAL ENCOUNTER (OUTPATIENT)
Dept: WOUND CARE | Age: 78
End: 2018-11-15
Payer: MEDICARE

## 2018-11-29 ENCOUNTER — HOSPITAL ENCOUNTER (OUTPATIENT)
Dept: WOUND CARE | Age: 78
Discharge: HOME | End: 2018-11-29
Payer: MEDICARE

## 2018-11-29 VITALS
RESPIRATION RATE: 19 BRPM | DIASTOLIC BLOOD PRESSURE: 65 MMHG | TEMPERATURE: 98.5 F | HEART RATE: 95 BPM | OXYGEN SATURATION: 100 % | SYSTOLIC BLOOD PRESSURE: 155 MMHG

## 2018-11-29 PROCEDURE — 87075 CULTR BACTERIA EXCEPT BLOOD: CPT

## 2018-11-29 PROCEDURE — 11042 DBRDMT SUBQ TIS 1ST 20SQCM/<: CPT

## 2018-11-29 PROCEDURE — 87186 SC STD MICRODIL/AGAR DIL: CPT

## 2018-11-29 PROCEDURE — 87077 CULTURE AEROBIC IDENTIFY: CPT

## 2018-11-29 PROCEDURE — 11045 DBRDMT SUBQ TISS EACH ADDL: CPT

## 2018-11-29 PROCEDURE — 87070 CULTURE OTHR SPECIMN AEROBIC: CPT

## 2018-11-29 PROCEDURE — 87184 SC STD DISK METHOD PER PLATE: CPT

## 2018-11-29 NOTE — WOUND CARE
[address] [facility]  Follow up note. Chief Complaint:  Cielo Flores is a 70 y.o.  female who presents for follow up of bilateral leg ulceration. She is status post previous visit debridement and can see the improvement along with use of gentamicin ointment and unnaboot. She has finished oral antimicrobial.      Review of systems  General: No fevers or chills. Cardiovascular: No chest pain or pressure. No palpitations. Pulmonary: No shortness of breath. Gastrointestinal: No nausea, vomiting. Derm: See above    Physical Exam:     Visit Vitals  /65 (BP 1 Location: Left arm, BP Patient Position: Sitting)   Pulse 95   Temp 98.5 °F (36.9 °C)   Resp 19   SpO2 100%   Breastfeeding? No     General: well developed, well nourished, pleasant , NAD. Hygiene good  Psych: cooperative. Pleasant. No anxiety or depression. Normal mood and affect. Neuro: alert and oriented to person/place/situation. Otherwise nonfocal.  Derm: Skin turgor normal for age, dry skin  HEENT: Normocephalic, atraumatic. EOMI. Conjunctiva clear. No scleral icterus. Chest: Good air entry bilaterally. Respirations non labored  Cardio[de-identified] Normal heart sounds,no rubs, murmurs or gallops  Abdomen: Soft, nontender, nondistended, normoactive bowel sounds  Lower extremities: color normal; temperature normal. Calves are supple, nontender.  Capillary refill <3 sec  Focussed Lower Extremity Exam:  Vascular exam:  RIGHT lower extremity:   edema, foot   DP pulse : PULSES EXAM:palpable  PT pulse: PULSES EXAM:palpable  Nails dystrophic   Left lower extremity: edema, foot   DP pulse : PULSES EXAM:palpable  PT pulse: PULSES EXAM:palpable  Nails dystrophic    Wound Description:   Wound Length: 11 cm, 8.5 cm    Wound Width :11 cm, 8.5 cm    Wound Depth :0.5, 0.1    Etiology: Venous stasis  Ulcer bed: {findings; base ulcer:pink granulation tissue with biofilm and fibrous tissu  Periwound: {Marlene-wound:epithelialization  Exudate: +sero-sang    Data Review:   No results found for this or any previous visit (from the past 24 hour(s)). Assessment:     Patient Active Problem List   Diagnosis Code    Rhabdomyolysis M62.82    Lactic acidosis E87.2    Leg ulcer ([address] L97.909    Venous insufficiency I87.2    HTN (hypertension) I10    Hypercholesteremia E78.00    DM2 (diabetes mellitus, type 2) ([address] E11.9    Chronic ulcer of left lower extremity with fat layer exposed ([address] L97.922    Cellulitis L03.90    Chronic venous hypertension with ulcer involving left side ([address] I87.312, L97.929    Ulcer of right pretibial region, with fat layer exposed ([address] L97.812    Skin ulcer of popliteal region, left, with fat layer exposed ([address] J87.268    Skin infection L08.9     70 y.o. female with chronic bilateral leg ulceration    Needs :  Serial debridement- debrided today- see note below  Good local wound care  Edema management  Nutrition optimization  Good Diabetic control  Plan:     In wound care clinic today:  Cleanse wound with NS or soap and water or commercial wound cleanser  Apply the following topically to wound bed:gentamicin after debridement with #10 blade full skin thickness through sub cutaneous and cultures taken today. Apply the following to stef-wound: NA  Apply the following dressings: Absorptive dressing    For Home Care/Self Care:  Cleanse wound with NS or soap and water or commercial wound cleanser  Keep dressing dry and intact when bathing  Apply the following to wound bed:gentimicin and mesalt  Apply the following to skin around wound: NA  Apply the following dressings: Absorptive dressing    Leave dressings in place until next visit    Patient to return for wound care in: 7  Days  Follow up with Nurse visit as recommended. PLEASE CONTACT OFFICE AS SOON AS POSSIBLE IF UNABLE TO MAKE THIS APPOINTMENT.  Inspect your wounds, looking for signs of infection which may include the following:  Increase in redness  Red \"streaks\" from wound  Increase in swelling  Fever  Unusual odor  Change in the amount of wound drainage. Should you experience any significant changes in your wound(s) or have any questions regarding your home care instructions please contact the wound center or your home health company. If after regular business hours, please call your family doctor or local emergency room. Edema Control:   Elevate legs as much as possible. Avoid standing in one position for more than 10 minutes. Avoid setting with legs down. Do not cross legs while sitting. Off-Loading:     Frequent position changes. Do not cross legs while sitting. Shift weight every 20 minutes or more when sitting for prolonged periods of time.     Signed By: Blanca Acevedo DPM     November 29, 2018

## 2018-11-30 NOTE — WOUND CARE
11/29/18 1448   Wound Leg Lower Left;Lateral   Date First Assessed/Time First Assessed: 10/25/18 1530   POA: Yes  Wound Type: Venous  Location: Leg Lower  Orientation: Left;Lateral   DRESSING STATUS Clean, dry, and intact   DRESSING TYPE Absorptive; Unna boot   Non-Pressure Injury Full thickness (subcut/muscle)   Wound Length (cm) 11 cm   Wound Width (cm) 11 cm   Wound Depth (cm) 0.5   Wound Surface area (cm^2) 121 cm^2   Change in Wound Size % 8.33   Condition of Base Pink;Slough   Condition of Edges Open   Tissue Type Pink;Red;Yellow   Drainage Amount  Moderate   Drainage Color Serosanguinous; Tan   Wound Odor Mild   Periwound Skin Condition Intact;Edematous   Cleansing and Cleansing Agents  Other (comment); Soap and water  (vashe)   Wound Leg Lower Right; Anterior; Inferior   Date First Assessed/Time First Assessed: 10/25/18 1533   POA: Yes  Wound Type: Venous  Location: Leg Lower  Orientation: Right; Anterior; Inferior   DRESSING STATUS Breakthrough drainage   DRESSING TYPE Unna boot   Non-Pressure Injury Full thickness (subcut/muscle)   Wound Length (cm) 8.5 cm   Wound Width (cm) 8.5 cm   Wound Depth (cm) 0.1   Wound Surface area (cm^2) 72.25 cm^2   Change in Wound Size % -351.56   Condition of Base Slough;Pink   Condition of Edges Open   Tissue Type Pink;Red;Yellow   Drainage Amount  Moderate   Drainage Color Serosanguinous; Tan   Wound Odor Mild   Periwound Skin Condition Edematous; Intact   Cleansing and Cleansing Agents  Other (comment); Soap and water  (vashe)

## 2018-11-30 NOTE — WOUND CARE
Home Care Instructions  Wound Cleansing & Dressings  Remove: Old Dressing    Clean legs with soap and water, clean wound with Normal Saline/Dermal Wound Cleanser    Apply to wound bed: Gentamycin, Mesalt     Apply to stef-wound: Protective lotion/cream    Cover with: Dry absorbent dressing    Apply double layer unna boot and coban/multiple layer compression wrap from mpj to the popliteal space in a light compression manner. Change Dressing: Twice weekly     Return Appointment: 2 weeks    PLEASE CONTACT OFFICE AS SOON AS POSSIBLE IF UNABLE TO MAKE THIS APPOINTMENT. Inspect your wounds, looking for signs of infection which may include the following:  Increase in redness  Red \"streaks\" from wound  Increase in swelling  Fever  Unusual odor  Change in the amount of wound drainage. Should you experience any significant changes in your wound(s) or have any questions regarding your home care instructions please contact the wound center or your home health company. If after regular business hours, please call your family doctor or local emergency room. Edema Control:   Elevate legs as much as possible. Avoid standing in one position for more than 10 minutes. Avoid setting with legs down. Do not cross legs while sitting. Off-Loading:     Frequent position changes. Do not cross legs while sitting. Shift weight every 20 minutes or more when sitting for prolonged periods of time.

## 2018-11-30 NOTE — WOUND CARE
11/29/18 1450   Wound Leg Lower Left;Lateral   Date First Assessed/Time First Assessed: 10/25/18 1530   POA: Yes  Wound Type: Venous  Location: Leg Lower  Orientation: Left;Lateral   Wound Length (cm) 11 cm   Wound Width (cm) 11.2 cm   Wound Depth (cm) 0.5   Wound Surface area (cm^2) 123.2 cm^2   Change in Wound Size % 6.67   Dressing Type Applied Other (Comment)  (Gentamycin cream, mesalt, mextra, ricky, unna boot, coban)   Procedure Tolerated Well   Wound Leg Lower Right; Anterior; Inferior   Date First Assessed/Time First Assessed: 10/25/18 1533   POA: Yes  Wound Type: Venous  Location: Leg Lower  Orientation: Right; Anterior; Inferior   Wound Length (cm) 8.5 cm   Wound Width (cm) 8.5 cm   Wound Depth (cm) 0.1   Wound Surface area (cm^2) 72.25 cm^2   Change in Wound Size % -351.56   Dressing Type Applied Other (Comment)  (Gentamycin cream, mesalt, mextra, ricky, unna boot, coban)   Procedure Tolerated Well   Post debridement photos:

## 2018-12-02 LAB
BACTERIA SPEC CULT: ABNORMAL
GRAM STN SPEC: ABNORMAL
GRAM STN SPEC: ABNORMAL
SERVICE CMNT-IMP: ABNORMAL

## 2018-12-04 LAB
BACTERIA SPEC CULT: ABNORMAL
BACTERIA SPEC CULT: ABNORMAL
BACTERIA SPEC CULT: NORMAL
SERVICE CMNT-IMP: ABNORMAL
SERVICE CMNT-IMP: NORMAL

## 2018-12-14 ENCOUNTER — HOSPITAL ENCOUNTER (OUTPATIENT)
Dept: WOUND CARE | Age: 78
Discharge: HOME | End: 2018-12-14
Payer: MEDICARE

## 2018-12-14 VITALS
SYSTOLIC BLOOD PRESSURE: 160 MMHG | OXYGEN SATURATION: 99 % | TEMPERATURE: 97.9 F | DIASTOLIC BLOOD PRESSURE: 79 MMHG | RESPIRATION RATE: 17 BRPM | HEART RATE: 92 BPM

## 2018-12-14 PROCEDURE — 29580 STRAPPING UNNA BOOT: CPT

## 2018-12-14 NOTE — WOUND CARE
12/14/18 1110   Wound Leg Lower Left;Lateral   Date First Assessed/Time First Assessed: 10/25/18 1530   POA: Yes  Wound Type: Venous  Location: Leg Lower  Orientation: Left;Lateral   DRESSING STATUS Breakthrough drainage; Intact; Old drainage; Saturated   DRESSING TYPE Absorptive   Non-Pressure Injury Full thickness (subcut/muscle)   Wound Length (cm) 10.8 cm   Wound Width (cm) 11.2 cm   Wound Depth (cm) 0.2   Wound Surface area (cm^2) 120.96 cm^2   Change in Wound Size % 8.36   Condition of Base Pink;Slough   Condition of Edges Open   Tissue Type Pink;Red;Yellow   Drainage Amount  Large   Drainage Color Serosanguinous; Tan   Wound Odor Mild   Periwound Skin Condition Edematous; Intact   Cleansing and Cleansing Agents  Dermal wound cleanser; Other (comment)   Dressing Type Applied Unna boot  (mesalt, 4x4,s exudry, ricky,coban)   Wound Leg Lower Right; Anterior; Inferior   Date First Assessed/Time First Assessed: 10/25/18 1533   POA: Yes  Wound Type: Venous  Location: Leg Lower  Orientation: Right; Anterior; Inferior   DRESSING STATUS Breakthrough drainage; Intact; Old drainage   DRESSING TYPE Unna boot  (Mesalt, ,4x4,exudry, ricky, coban)   Non-Pressure Injury Full thickness (subcut/muscle)   Wound Length (cm) 7.8 cm   Wound Width (cm) 6 cm   Wound Depth (cm) 0.2   Wound Surface area (cm^2) 46.8 cm^2   Change in Wound Size % -192.5   Condition of Base Pink;Slough   Condition of Edges Rolled/curled; Open   Tissue Type Pink;Red;Yellow   Tissue Type Percent Maroon/Purple 25 %   Tissue Type Percent Yellow 75   Drainage Amount  Moderate   Drainage Color Serosanguinous; Tan   Wound Odor Mild   Periwound Skin Condition Edematous; Intact   Dressing Type Applied Unna boot; Other (Comment)  (mesalt, 4x4,s exudry, ricky, coban)   Procedure Tolerated Well

## 2019-01-11 ENCOUNTER — HOSPITAL ENCOUNTER (OUTPATIENT)
Dept: WOUND CARE | Age: 79
Discharge: HOME | End: 2019-01-11
Payer: MEDICARE

## 2019-01-11 PROCEDURE — 29580 STRAPPING UNNA BOOT: CPT

## 2019-01-11 NOTE — WOUND CARE
01/11/19 1144   Wound Leg Lower Left;Lateral   Date First Assessed/Time First Assessed: 10/25/18 1530   POA: Yes  Wound Type: Venous  Location: Leg Lower  Orientation: Left;Lateral   DRESSING STATUS Breakthrough drainage   DRESSING TYPE Unna boot  (absorptive dressing, mesalt)   Non-Pressure Injury Full thickness (subcut/muscle)   Wound Length (cm) 11 cm   Wound Width (cm) 11.2 cm   Wound Depth (cm) 0.3   Wound Surface area (cm^2) 123.2 cm^2   Change in Wound Size % 6.67   Condition of Base Pink;Slough   Condition of Edges Open;Rolled/curled   Tissue Type Pink;Yellow   Tissue Type Percent Pink 50   Tissue Type Percent Yellow 50   Drainage Amount  Large   Drainage Color Green;Serous   Wound Odor Mild   Periwound Skin Condition Edematous; Other (comment)  (dry)   Cleansing and Cleansing Agents  Other (comment)  (vashe)   Dressing Type Applied Unna boot  (ricky, exudry, aquacell ag, santyl )   Procedure Tolerated Well   Wound Leg Lower Right; Anterior;Superior   Date First Assessed/Time First Assessed: 10/25/18 1533   POA: Yes  Wound Type: Venous  Location: Leg Lower  Orientation: Right; Anterior;Superior   DRESSING STATUS Clean, dry, and intact   DRESSING TYPE Unna boot  (absorptive dressing, mesalt )   Non-Pressure Injury Full thickness (subcut/muscle)   Wound Length (cm) 9 cm   Wound Width (cm) 7 cm   Wound Depth (cm) 0.4   Wound Surface area (cm^2) 63 cm^2   Change in Wound Size % -109.3   Condition of Base Slough;Pink   Condition of Edges Rolled/curled; Open   Tissue Type Percent Pink 25   Tissue Type Percent Yellow 75   Drainage Amount  Large   Drainage Color Serous; Tan   Wound Odor Mild   Periwound Skin Condition Edematous; Other (comment)  (dry)   Cleansing and Cleansing Agents  Other (comment)  (vashe)   Dressing Type Applied Unna boot  (ricky, exudry, aquacell ag, santyl )   Procedure Tolerated Well

## 2019-01-11 NOTE — DISCHARGE INSTRUCTIONS
Patient to return for wound care in: 3 Weeks with Dr. Partida See. Inspect your wounds, looking for signs of infection which may include the following:  Increase in redness  Red \"streaks\" from wound  Increase in swelling Fever  Unusual odor Change in the amount of wound drainage. Should you experience any significant changes in your wound(s) or have any questions regarding your home care instructions please contact the wound center or your home health company. If after regular business hours, please call your family doctor or local emergency room. Edema Control:   Elevate legs as much as possible. Avoid standing in one position for more than 10 minutes. Avoid setting with legs down. Do not cross legs while sitting. Off-Loading:Frequent position changes. Do not cross legs while sitting. Shift weight every 20 minutes or more when sitting for prolonged periods of time.

## 2019-01-24 NOTE — PROGRESS NOTES
[address]    Subjective:         Chief Complaint: John Rodríguez is a 70 y.o. NIDDM female who presents to [facility] today, for treatment of open wounds on both legs. She states it started a year and a half ago as a small blister on her left leg, then got bigger; later the same happened with her right leg. She also states she has home health care twice a week. Her FBS this AM was 112. 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 Current Medications:  Prior to Admission medications    Medication Sig Start Date End Date Taking? Authorizing Provider   amLODIPine (NORVASC) 5 mg tablet Take 1 Tab by mouth daily. 8/13/18   Chris Lisa MD   trospium (SANCTURA XL) 60 mg capsule Take 60 mg by mouth Daily (before breakfast). Provider, Historical   simvastatin (ZOCOR) 20 mg tablet Take 20 mg by mouth nightly. Provider, Historical   aspirin delayed-release 81 mg tablet Take 81 mg by mouth daily. Tramaine Richmond MD   gabapentin (NEURONTIN) 100 mg capsule Take 100 mg by mouth three (3) times daily. Tramaine Richmond MD   glipiZIDE SR (GLUCOTROL XL) 10 mg CR tablet Take 10 mg by mouth daily. Tramaine Richmond MD   multivitamin (ONE A DAY) tablet Take 1 Tab by mouth daily. Tramaine Richmond MD   omeprazole (PRILOSEC) 20 mg capsule Take 20 mg by mouth daily. Tramaine Richmond MD   polyethylene glycol (MIRALAX) 17 gram/dose powder Take 17 g by mouth daily. Tramaine Richmond MD   SITagliptin (JANUVIA) 100 mg tablet Take 100 mg by mouth daily. Tramaine Richmond MD   Fish Oil-Vit E-Fat UXWC7- (SUPER OMEGA-3) 400-5 mg-unit cap Take 1 Tab by mouth daily.     Tramaine Richmond MD     Allergies Allergen Reactions    Codeine Rash and Itching    Percocet [Oxycodone-Acetaminophen] Nausea and Vomiting    Tramadol Rash and Itching     No family history on file. Social History     Socioeconomic History    Marital status: SINGLE     Spouse name: Not on file    Number of children: Not on file    Years of education: Not on file    Highest education level: Not on file   Social Needs    Financial resource strain: Not on file    Food insecurity - worry: Not on file    Food insecurity - inability: Not on file    Transportation needs - medical: Not on file   [employer] needs - non-medical: Not on file   Occupational History    Not on file   Tobacco Use    Smoking status: Never Smoker    Smokeless tobacco: Never Used   Substance and Sexual Activity    Alcohol use: Not on file    Drug use: Not on file    Sexual activity: Not on file   Other Topics Concern    Not on file   Social History Narrative    Not on file       Review of Systems:    Gen: No fever, chills, malaise, weight loss/gain. Heent: No headache, rhinorrhea, epistaxis, ear pain, hearing loss, sinus pain, neck pain/stiffness, sore throat. Heart: No chest pain, palpitations, GEE, pnd, or orthopnea. Resp: No cough, hemoptysis, wheezing and shortness of breath. GI: No nausea, vomiting, diarrhea, constipation, melena or hematochezia. : No urinary obstruction, dysuria or hematuria. Derm:  Open ulcers bilateral anterior lateral shins. Musc/skeletal: no bone or joint complains. Vasc: No edema, cyanosis or claudication. Endo: No heat/cold intolerance, no polyuria,polydipsia or polyphagia. Neuro: No unilateral weakness, numbness, tingling. No seizures. Heme: No easy bruising or bleeding. Objective:     Physical Assessment:    There were no vitals filed for this visit.   Lower Extremity Exam:    Vascular Exam:  Dorsalis Pedis Pulse: 2/4 right, 1/4 left   Posterior Tibial Pulse: 1/4 Bilateral Lower Extremities  Capillary Refill is < 3 seconds Bilateral Lower Extremities  Temperature of extremity from proximal to distal is warm and perfused Bilateral Lower Extremities      Integumentary Exam:  Skin Texture is WNL Bilateral Lower Extremities  Pedal Hair is present Bilateral Lower Extremities  Examination of lower extremity reveals open ulcers bilateral anterior lateral shins. Etiology: Started as a blister on left leg, increased in size, then later occurred to the right leg -    Wound Description:   Wound Type: Venous stasis     Indication: Acute <30days   Chronic >30days   Status: initial  improving  no change  deteriorating     Measurements:    Wound Length: 11 cm, 9 cm      Wound Width :11.2 cm, 7 cm      Wound Depth :0.3, 0.4     Tissue Type: Ulcer bed: yellow fibrotic base    Periwound: Edematous, dry. Exudate:Green, serous drainage noted  Epithelial: with out necrosis  Granulation: with out necrosis  Subcutaneous: with out necrosis  Slough: yes  Eschar: no  Tendon: exposed no -with out necrosis  Fascia: exposed no -with out necrosis  Muscle: exposed no -with out necrosis  Bone: exposed no -with out necrosis   Drainage: Stable      Debridement:  --not required    Infection: no  Edema:  yes  Both lower extremities   Status: increased     Compression: no    Offloading:  No   Nicotine/Tobacco Use:  No      Orthopedic Exam:  Musculoskeletal Exam: Muscle strength is 5/5 Bilateral Lower Extremities. Tendon, Muscle and Bone and joints and WNL Bilateral Lower Extremities. Range of motion and strength noted in both ankles is WNL. Neurological Exam:  Ankle deep tendon reflexes: Are WNL Bilateral Lower Extremities  Epicritic and Protective Sensation: Are Decreased Bilateral Lower Extremities  Evaluation of lower extremity nerves: Are Decreased as seen with 5.07 mm monofilament at 5 points per foot.       Laboratory Results:  @ Basic Metabolic Profile@  Lab Results   Component Value Date     08/13/2018 CO2 34 (H) 08/13/2018    BUN 12 08/13/2018       Data Review: No results found for this or any previous visit (from the past 24 hour(s)). Assessment:     70 y.o. 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 Open ulcer- bilateral lower extremities  Venous insufficiency with lower extremity edema bilateral lower extremities. Recommendation:     Plan:     Plan/Procedure:  Needs :    Good local wound care      In wound care clinic today:  Cleanse wound with commercial wound cleanser  Apply the following topically to wound bed: Santyl    Apply the following to stef-wound: NA   Apply the following dressings: UNNA boot  Delmar exudry, aquacell ag     Dispense prescription for collagenase santyl ointment with aquacell ag, exudry, unnaboot. Patient to return for wound care in: 7 Days  Follow up with Nurse visit as recommended. PLEASE CONTACT OFFICE AS SOON AS POSSIBLE IF UNABLE TO MAKE THIS APPOINTMENT. Inspect your wounds, looking for signs of infection which may include the following:  Increase in redness  Red \"streaks\" from wound  Increase in swelling Fever  Unusual odor Change in the amount of wound drainage. Should you experience any significant changes in your wound(s) or have any questions regarding your home care instructions please contact the wound center or your home health company. If after regular business hours, please call your family doctor or local emergency room. Edema Control:   Elevate legs as much as possible. Avoid standing in one position for more than 10 minutes. Avoid setting with legs down. Do not cross legs while sitting.         Signed By: Molly Mcghee DPM      January 24, 2019, 10:38 AM

## 2019-02-01 ENCOUNTER — HOSPITAL ENCOUNTER (OUTPATIENT)
Dept: WOUND CARE | Age: 79
Discharge: HOME | End: 2019-02-01
Attending: PODIATRIST
Payer: MEDICARE

## 2019-02-01 VITALS
OXYGEN SATURATION: 100 % | TEMPERATURE: 98.2 F | HEART RATE: 82 BPM | RESPIRATION RATE: 17 BRPM | DIASTOLIC BLOOD PRESSURE: 66 MMHG | SYSTOLIC BLOOD PRESSURE: 143 MMHG

## 2019-02-01 PROCEDURE — 29580 STRAPPING UNNA BOOT: CPT

## 2019-02-01 NOTE — WOUND CARE
02/01/19 1452 Wound Leg Lower Left;Lateral  
Date First Assessed/Time First Assessed: 10/25/18 1530   POA: Yes  Wound Type: Venous  Location: Leg Lower  Orientation: Left;Lateral  
Dressing Status  Clean, dry, and intact Dressing Type  Absorptive; Unna boot Non-Pressure Injury Full thickness (subcut/muscle) Wound Length (cm) 10.6 cm Wound Width (cm) 11 cm Wound Depth (cm) 0.4 Wound Surface area (cm^2) 116.6 cm^2 Change in Wound Size % 11.67 Condition of Base Mascot;Slough;Granulation Condition of Edges Open Tissue Type Pink;Red;Yellow Drainage Amount  Moderate Drainage Color Green;Serosanguinous Wound Odor Mild Periwound Skin Condition Intact;Edematous Cleansing and Cleansing Agents  Other (comment); Soap and water (vashe) Wound Leg Lower Right; Anterior;Superior Date First Assessed/Time First Assessed: 10/25/18 1533   POA: Yes  Wound Type: Venous  Location: Leg Lower  Orientation: Right; Anterior;Superior Dressing Status  Clean, dry, and intact Dressing Type  Absorptive; Unna boot Non-Pressure Injury Full thickness (subcut/muscle) Wound Length (cm) 8.8 cm Wound Width (cm) 9.5 cm Wound Depth (cm) 0.4 Wound Surface area (cm^2) 83.6 cm^2 Change in Wound Size % -177.74 Condition of Base Mascot;Granulation;Slough Condition of Edges Open Tissue Type Pink;Red;Yellow Drainage Amount  Moderate Drainage Color Green;Serosanguinous Wound Odor Mild Periwound Skin Condition Edematous; Intact Cleansing and Cleansing Agents  Other (comment); Soap and water (vashe)

## 2019-02-01 NOTE — DISCHARGE INSTRUCTIONS
For Home Care/Self Care  Frequency: Three times weekly   Cleanse wound with NS or soap and water or commercial wound cleanser  Keep dressing dry and intact when bathing  Apply the following to wound bed: Santyl, Aquacel Ag  Apply the following to skin around wound: Protective barrier cream  Apply the following dressings: Mextra, ricky, unna boot, coban    Leave dressings in place until next visit    Patient to return for wound care in: 15  Days    PLEASE CONTACT OFFICE AS SOON AS POSSIBLE IF UNABLE TO MAKE THIS APPOINTMENT. Inspect your wounds, looking for signs of infection which may include the following:  Increase in redness  Red \"streaks\" from wound  Increase in swelling  Fever  Unusual odor  Change in the amount of wound drainage. Should you experience any significant changes in your wound(s) or have any questions regarding your home care instructions please contact the wound center or your home health company. If after regular business hours, please call your family doctor or local emergency room. Edema Control:   Elevate legs as much as possible. Avoid standing in one position for more than 10 minutes. Avoid setting with legs down. Do not cross legs while sitting. Off-Loading:     Frequent position changes. Do not cross legs while sitting. Shift weight every 20 minutes or more when sitting for prolonged periods of time.

## 2019-02-15 ENCOUNTER — HOSPITAL ENCOUNTER (OUTPATIENT)
Dept: WOUND CARE | Age: 79
Discharge: HOME | End: 2019-02-15
Attending: PODIATRIST
Payer: MEDICARE

## 2019-02-15 VITALS
DIASTOLIC BLOOD PRESSURE: 56 MMHG | OXYGEN SATURATION: 100 % | SYSTOLIC BLOOD PRESSURE: 130 MMHG | RESPIRATION RATE: 18 BRPM | BODY MASS INDEX: 30.61 KG/M2 | HEIGHT: 67 IN | HEART RATE: 81 BPM | WEIGHT: 195 LBS | TEMPERATURE: 98.7 F

## 2019-02-15 PROCEDURE — 29580 STRAPPING UNNA BOOT: CPT

## 2019-02-15 NOTE — WOUND CARE
02/15/19 1345 Wound Leg Lower Left;Lateral  
Date First Assessed/Time First Assessed: 10/25/18 1530   POA: Yes  Wound Type: Venous  Location: Leg Lower  Orientation: Left;Lateral  
Dressing Status  Clean;Dry Dressing Type  Absorptive; Unna boot Non-Pressure Injury Full thickness (subcut/muscle) Wound Length (cm) 10.2 cm Wound Width (cm) 12.3 cm Wound Depth (cm) 0.4 Wound Surface area (cm^2) 125.46 cm^2 Change in Wound Size % 4.95 Condition of Base Granulation;Pink;Slough Condition of Edges Open Tissue Type Pink;Yellow Tissue Type Percent Pink 80 Tissue Type Percent Yellow 20 Drainage Amount  Moderate Drainage Color Serosanguinous Wound Odor Mild Periwound Skin Condition Edematous; Intact Cleansing and Cleansing Agents  (foam wash, vashe and barrier wipes) Dressing Type Applied (santyl, aquacel ag, [facility] ) Procedure Tolerated Well Wound Leg Lower Right; Anterior;Superior Date First Assessed/Time First Assessed: 10/25/18 1533   POA: Yes  Wound Type: Venous  Location: Leg Lower  Orientation: Right; Anterior;Superior Dressing Type  Absorptive; Unna boot Non-Pressure Injury Full thickness (subcut/muscle) Wound Length (cm) 8 cm Wound Width (cm) 6.8 cm Wound Depth (cm) 0.2 Wound Surface area (cm^2) 54.4 cm^2 Change in Wound Size % -80.73 Condition of Base Granulation;Pink;Slough Condition of Edges Open Tissue Type Pink;Red;Yellow Tissue Type Percent Red 30 Tissue Type Percent Yellow 70 Drainage Amount  Small Drainage Color Serosanguinous Wound Odor None Periwound Skin Condition Edematous; Intact Cleansing and Cleansing Agents  (foam wash, vashe and barrier wipes) Dressing Type Applied Unna boot 
(santyl, aquacel ag, [facility]) Procedure Tolerated Well

## 2019-02-26 ENCOUNTER — HOSPITAL ENCOUNTER (OUTPATIENT)
Dept: VASCULAR SURGERY | Age: 79
Discharge: HOME | End: 2019-02-26
Payer: MEDICARE

## 2019-02-26 ENCOUNTER — HOSPITAL ENCOUNTER (OUTPATIENT)
Dept: WOUND CARE | Age: 79
Discharge: HOME | End: 2019-02-26
Attending: PODIATRIST
Payer: MEDICARE

## 2019-02-26 VITALS
HEART RATE: 81 BPM | SYSTOLIC BLOOD PRESSURE: 150 MMHG | OXYGEN SATURATION: 100 % | RESPIRATION RATE: 18 BRPM | DIASTOLIC BLOOD PRESSURE: 67 MMHG | TEMPERATURE: 98.2 F

## 2019-02-26 DIAGNOSIS — L97.909 LEG ULCER (HCC): ICD-10-CM

## 2019-02-26 PROCEDURE — 29580 STRAPPING UNNA BOOT: CPT

## 2019-02-26 PROCEDURE — 93922 UPR/L XTREMITY ART 2 LEVELS: CPT

## 2019-02-26 NOTE — DISCHARGE INSTRUCTIONS
For Home Care/Self Care  Frequency: Three times weekly   Cleanse wound with NS or soap and water or commercial wound cleanser  Keep dressing dry and intact when bathing  Apply the following to wound bed: Santyl, Aquacel Ag  Apply the following to skin around wound: Protective barrier cream  Apply the following dressings: Mextra, ricky, unna boot, coban      PLEASE CONTACT OFFICE AS SOON AS POSSIBLE IF UNABLE TO MAKE THIS APPOINTMENT. Inspect your wounds, looking for signs of infection which may include the following:  Increase in redness  Red \"streaks\" from wound  Increase in swelling  Fever  Unusual odor  Change in the amount of wound drainage. Should you experience any significant changes in your wound(s) or have any questions regarding your home care instructions please contact the wound center or your home health company. If after regular business hours, please call your family doctor or local emergency room. Edema Control:   Elevate legs as much as possible. Avoid standing in one position for more than 10 minutes. Avoid setting with legs down. Do not cross legs while sitting. Off-Loading:     Frequent position changes. Do not cross legs while sitting. Shift weight every 20 minutes or more when sitting for prolonged periods of time.

## 2019-02-27 LAB
LEFT ABI: 1.39
LEFT ANTERIOR TIBIAL: 175 MMHG
LEFT ARM BP: 137 MMHG
LEFT POSTERIOR TIBIAL: 191 MMHG
LEFT TBI: 0.85
LEFT TOE PRESSURE: 116 MMHG
RIGHT ABI: 1.41
RIGHT ANTERIOR TIBIAL: 193 MMHG
RIGHT ARM BP: 131 MMHG
RIGHT POSTERIOR TIBIAL: 189 MMHG
RIGHT TBI: 0.95
RIGHT TOE PRESSURE: 130 MMHG

## 2019-03-08 ENCOUNTER — HOSPITAL ENCOUNTER (OUTPATIENT)
Dept: WOUND CARE | Age: 79
Discharge: HOME | End: 2019-03-08
Payer: MEDICARE

## 2019-03-08 VITALS
OXYGEN SATURATION: 100 % | TEMPERATURE: 98.6 F | HEART RATE: 81 BPM | SYSTOLIC BLOOD PRESSURE: 150 MMHG | DIASTOLIC BLOOD PRESSURE: 75 MMHG

## 2019-03-08 PROCEDURE — 29580 STRAPPING UNNA BOOT: CPT

## 2019-03-08 NOTE — DISCHARGE INSTRUCTIONS
Leave dressings in place until next visit     Patient to return for wound care in: 2/26/2019 @ 1000 for VANDANA's     PLEASE CONTACT OFFICE AS SOON AS POSSIBLE IF UNABLE TO MAKE THIS APPOINTMENT. Inspect your wounds, looking for signs of infection which may include the following:  Increase in redness  Red \"streaks\" from wound  Increase in swelling  Fever  Unusual odor  Change in the amount of wound drainage. Should you experience any significant changes in your wound(s) or have any questions regarding your home care instructions please contact the wound center or your home health company. If after regular business hours, please call your family doctor or local emergency room. Edema Control:   Elevate legs as much as possible. Avoid standing in one position for more than 10 minutes. Avoid setting with legs down. Do not cross legs while sitting. Off-Loading:     Frequent position changes. Do not cross legs while sitting. Shift weight every 20 minutes or more when sitting for prolonged periods of time.     03/08/2019 1454

## 2019-03-08 NOTE — WOUND CARE
03/08/19 1222   Wound Leg Lower Left;Lateral   Date First Assessed/Time First Assessed: 10/25/18 1530   POA: Yes  Wound Type: Venous  Location: Leg Lower  Orientation: Left;Lateral   Dressing Status  Breakthrough drainage   Dressing Type  Absorptive; Unna boot   Non-Pressure Injury Full thickness (subcut/muscle)   Wound Length (cm) 10.5 cm   Wound Width (cm) 10.5 cm   Wound Depth (cm) 0.2   Wound Surface area (cm^2) 110.25 cm^2   Change in Wound Size % 16.48   Condition of Base Granulation;Pink   Condition of Edges Open   Tissue Type Pink;Yellow   Tissue Type Percent Pink 80   Tissue Type Percent Yellow 20   Drainage Amount  Moderate   Drainage Color Sanguinous; Tan   Wound Odor Mild;Musty   Periwound Skin Condition Erythema, blanchable   Cleansing and Cleansing Agents  Other (comment)  (VASHE wound cleanser)   Dressing Type Applied Absorptive; Unna boot  (mesalt, alginate, actisorb, exudry, coban)   Procedure Tolerated Well   Wound Leg Lower Right; Anterior;Superior   Date First Assessed/Time First Assessed: 10/25/18 1533   POA: Yes  Wound Type: Venous  Location: Leg Lower  Orientation: Right; Anterior;Superior   Dressing Status  Breakthrough drainage; Old drainage   Dressing Type  Absorptive; Unna boot  (coban, mextra)   Non-Pressure Injury Full thickness (subcut/muscle)   Wound Length (cm) 8 cm   Wound Width (cm) 7 cm   Wound Depth (cm) 0.2   Wound Surface area (cm^2) 56 cm^2   Change in Wound Size % -86.05   Condition of Base Pink;Slough   Condition of Edges Open   Tissue Type Percent Red 30   Tissue Type Percent Yellow 70   Drainage Amount  Moderate   Drainage Color Serosanguinous; Tan   Wound Odor Mild;Musty   Periwound Skin Condition Erythema, blanchable   Cleansing and Cleansing Agents  Other (comment)  (Vashe wound cleanser)   Dressing Type Applied Unna boot; Other (Comment)  (mesalt, alginate, actisorb, exudry, ricky,)   Procedure Tolerated Well        03/08/19 1222   Wound Leg Lower Left;Lateral   Date First Assessed/Time First Assessed: 10/25/18 1530   POA: Yes  Wound Type: Venous  Location: Leg Lower  Orientation: Left;Lateral   Dressing Status  Breakthrough drainage   Dressing Type  Absorptive; Unna boot   Non-Pressure Injury Full thickness (subcut/muscle)   Wound Length (cm) 10.5 cm   Wound Width (cm) 10.5 cm   Wound Depth (cm) 0.2   Wound Surface area (cm^2) 110.25 cm^2   Change in Wound Size % 16.48   Condition of Base Granulation;Pink   Condition of Edges Open   Tissue Type Pink;Yellow   Tissue Type Percent Pink 80   Tissue Type Percent Yellow 20   Drainage Amount  Moderate   Drainage Color Sanguinous; Tan   Wound Odor Mild;Musty   Periwound Skin Condition Erythema, blanchable   Cleansing and Cleansing Agents  Other (comment)  (VASHE wound cleanser)   Dressing Type Applied Absorptive; Unna boot  (mesalt, alginate, actisorb, exudry, coban)   Procedure Tolerated Well   Wound Leg Lower Right; Anterior;Superior   Date First Assessed/Time First Assessed: 10/25/18 1533   POA: Yes  Wound Type: Venous  Location: Leg Lower  Orientation: Right; Anterior;Superior   Dressing Status  Breakthrough drainage; Old drainage   Dressing Type  Absorptive; Unna boot  (coban, mextra)   Non-Pressure Injury Full thickness (subcut/muscle)   Wound Length (cm) 8 cm   Wound Width (cm) 7 cm   Wound Depth (cm) 0.2   Wound Surface area (cm^2) 56 cm^2   Change in Wound Size % -86.05   Condition of Base Pink;Slough   Condition of Edges Open   Tissue Type Percent Red 30   Tissue Type Percent Yellow 70   Drainage Amount  Moderate   Drainage Color Serosanguinous; Tan   Wound Odor Mild;Musty   Periwound Skin Condition Erythema, blanchable   Cleansing and Cleansing Agents  Other (comment)  (Vashe wound cleanser)   Dressing Type Applied Unna boot; Other (Comment)  (mesalt, alginate, actisorb, exudry, ricky,)   Procedure Tolerated Well

## 2019-04-10 NOTE — PROGRESS NOTES
[address]    Subjective:         Chief Complaint: Caitie aMurer is a 70 y.o. NIDDM female who returns to [facility] today, for continued treatment of open ulcers on both of her legs. She states they started a year and a half ago as small blisters then got bigger. She sees Dr. Juan Farias for care of her diabetes and her FBS this AM was 108. 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 Current Medications:  Prior to Admission medications    Medication Sig Start Date End Date Taking? Authorizing Provider   amLODIPine (NORVASC) 5 mg tablet Take 1 Tab by mouth daily. 8/13/18   Maik Sharp MD   trospium (SANCTURA XL) 60 mg capsule Take 60 mg by mouth Daily (before breakfast). Provider, Historical   simvastatin (ZOCOR) 20 mg tablet Take 20 mg by mouth nightly. Provider, Historical   aspirin delayed-release 81 mg tablet Take 81 mg by mouth daily. Tramaine Richmond MD   gabapentin (NEURONTIN) 100 mg capsule Take 100 mg by mouth three (3) times daily. Tramaine Richmond MD   glipiZIDE SR (GLUCOTROL XL) 10 mg CR tablet Take 10 mg by mouth daily. Tramaine Richmond MD   multivitamin (ONE A DAY) tablet Take 1 Tab by mouth daily. Tramaine Richmond MD   omeprazole (PRILOSEC) 20 mg capsule Take 20 mg by mouth daily. Tramaine Richmond MD   polyethylene glycol (MIRALAX) 17 gram/dose powder Take 17 g by mouth daily. Tramaine Richmond MD   SITagliptin (JANUVIA) 100 mg tablet Take 100 mg by mouth daily. Tramaine Richmond MD   Fish Oil-Vit E-Fat LNAN2- (SUPER OMEGA-3) 400-5 mg-unit cap Take 1 Tab by mouth daily.     Tramaine Richmond MD     Allergies   Allergen Reactions    Codeine Rash and Itching    Percocet [Oxycodone-Acetaminophen] Nausea and Vomiting    Tramadol Rash and Itching     No family history on file. Social History     Socioeconomic History    Marital status: SINGLE     Spouse name: Not on file    Number of children: Not on file    Years of education: Not on file    Highest education level: Not on file   Occupational History    Not on file   Social Needs    Financial resource strain: Not on file    Food insecurity:     Worry: Not on file     Inability: Not on file    Transportation needs:     Medical: Not on file     Non-medical: Not on file   Tobacco Use    Smoking status: Never Smoker    Smokeless tobacco: Never Used   Substance and Sexual Activity    Alcohol use: Not on file    Drug use: Not on file    Sexual activity: Not on file   Lifestyle    Physical activity:     Days per week: Not on file     Minutes per session: Not on file    Stress: Not on file   Relationships    Social connections:     Talks on phone: Not on file     Gets together: Not on file     Attends Worship service: Not on file     Active member of club or organization: Not on file     Attends meetings of clubs or organizations: Not on file     Relationship status: Not on file    Intimate partner violence:     Fear of current or ex partner: Not on file     Emotionally abused: Not on file     Physically abused: Not on file     Forced sexual activity: Not on file   Other Topics Concern    Not on file   Social History Narrative    Not on file       Review of Systems:      Derm:  Open ulcers bilateral anterior lateral shins. Musc/skeletal: no bone or joint complains. Vasc: No edema, cyanosis or claudication.          Objective:     Physical Assessment:    Vitals:    03/08/19 1221   BP: 150/75   Pulse: 81   Temp: 98.6 °F (37 °C)   SpO2: 100%     Lower Extremity Exam:    Vascular Exam:  Dorsalis Pedis Pulse: 2/4 bilateral lower extremities  Posterior Tibial Pulse: 0/4 Bilateral Lower Extremities  Capillary Refill is < 3 seconds Bilateral Lower Extremities  Temperature of extremity from proximal to distal is warm and perfused Bilateral Lower Extremities  Recent VANDANA results: (02/26/2018) R = 1.41, L = 1.39     Integumentary Exam:  Skin Texture is WNL Bilateral Lower Extremities  Pedal Hair is present Bilateral Lower Extremities  Examination of lower extremity reveals open ulcers bilateral anterior lateral shins. Etiology: Started as a blister on left leg, increased in size, then later occurred to the right leg -    Wound Description:   Wound Type: Venous stasis     Indication:   Chronic >30days   Status:  no change       Measurements:    Wound Length: 8 cm, 10.5 cm      Wound Width :7 cm, 10.5 cm      Wound Depth :0.2, 0.2     Tissue Type: Ulcer bed: yellow     Periwound: Intact with erythema    Exudate: serosanguinous drainage noted  Epithelial: with out necrosis  Granulation: with out necrosis  Subcutaneous: with out necrosis  Slough: yes  Eschar: no  Tendon: exposed no -with out necrosis  Fascia: exposed no -with out necrosis  Muscle: exposed no -with out necrosis  Bone: exposed no -with out necrosis   Drainage: Stable      Debridement:  --not required    Infection: no  Edema:  yes  Both lower extremities   Status: controlled     Compression: no    Offloading:  No   Nicotine/Tobacco Use:  No      Orthopedic Exam:  Musculoskeletal Exam: Muscle strength is 5/5 Bilateral Lower Extremities. Tendon, Muscle and Bone and joints and WNL Bilateral Lower Extremities. Range of motion and strength noted in both ankles is WNL. Neurological Exam:  Ankle deep tendon reflexes: Are Decreased Bilateral Lower Extremities  Epicritic and Protective Sensation: Are Decreased Bilateral Lower Extremities  Evaluation of lower extremity nerves: Are Decreased as seen with 5.07 mm monofilament at 5 points per foot.       Laboratory Results:  @ Basic Metabolic Profile@  Lab Results   Component Value Date     08/13/2018    CO2 34 (H) 08/13/2018    BUN 12 08/13/2018       Data Review: No results found for this or any previous visit (from the past 24 hour(s)). Assessment:     70 y.o. female with   Patient Active Problem List   Diagnosis Code    Rhabdomyolysis M62.82    Lactic acidosis E87.2    Leg ulcer ([address] L97.909    Venous insufficiency I87.2    HTN (hypertension) I10    Hypercholesteremia E78.00    DM2 (diabetes mellitus, type 2) (HCC) E11.9    Chronic ulcer of left lower extremity with fat layer exposed ([address] L97.922    Cellulitis L03.90    Chronic venous hypertension with ulcer involving left side I87.312, L97.929    Ulcer of right pretibial region, with fat layer exposed ([address] L97.812    Skin ulcer of popliteal region, left, with fat layer exposed ([address] D65.405    Skin infection L08.9       Type II Diabetes. Open ulcer- bilateral lower extremities  Venous insufficiency with lower extremity edema bilateral lower extremities. Recommendation:     Plan:     Plan/Procedure:    Surgery is scheduled for 04/19/2019. Needs :    Good local wound care      In wound care clinic today:  Cleanse wound with commercial wound cleanser  Apply the following topically to wound bed: Mesalt, Calcium alginate    Apply the following to stef-wound: NA  Apply the following dressings: Exudry, Unnaboot        Patient to return for wound care in: 7 Days  Follow up with Nurse visit as recommended. PLEASE CONTACT OFFICE AS SOON AS POSSIBLE IF UNABLE TO MAKE THIS APPOINTMENT. Inspect your wounds, looking for signs of infection which may include the following:  Increase in redness  Red \"streaks\" from wound  Increase in swelling Fever  Unusual odor Change in the amount of wound drainage. Should you experience any significant changes in your wound(s) or have any questions regarding your home care instructions please contact the wound center or your home health company.  If after regular business hours, please call your family doctor or local emergency room. Edema Control:   Elevate legs as much as possible. Avoid standing in one position for more than 10 minutes. Avoid setting with legs down. Do not cross legs while sitting.         Signed By: Richmond Hassan DPM      April 10, 2019, 10:38 AM

## 2019-04-10 NOTE — PROGRESS NOTES
[address] Subjective: Chief Complaint: Magdy Johnson is a 70 y.o. NIDDM female who returns to [facility] today, for treatment of open wounds on both legs. Her FBS this AM was 120. Cultures that were taken from her wounds in 11/2018 grew positive pseudomonas so she took Bactrim. Past Medical History:  
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 No pertinent surgical history. Current Medications: 
Prior to Admission medications Medication Sig Start Date End Date Taking? Authorizing Provider  
amLODIPine (NORVASC) 5 mg tablet Take 1 Tab by mouth daily. 8/13/18   Sixto Roche MD  
trospium (SANCTURA XL) 60 mg capsule Take 60 mg by mouth Daily (before breakfast). Provider, Historical  
simvastatin (ZOCOR) 20 mg tablet Take 20 mg by mouth nightly. Provider, Historical  
aspirin delayed-release 81 mg tablet Take 81 mg by mouth daily. Tramaine Richmond MD  
gabapentin (NEURONTIN) 100 mg capsule Take 100 mg by mouth three (3) times daily. Tramaine Richmond MD  
glipiZIDE SR (GLUCOTROL XL) 10 mg CR tablet Take 10 mg by mouth daily. Tramaine Richmond MD  
multivitamin (ONE A DAY) tablet Take 1 Tab by mouth daily. Tramaine Richmond MD  
omeprazole (PRILOSEC) 20 mg capsule Take 20 mg by mouth daily. Tramaine Richmond MD  
polyethylene glycol (MIRALAX) 17 gram/dose powder Take 17 g by mouth daily. Tramaine Richmond MD  
SITagliptin (JANUVIA) 100 mg tablet Take 100 mg by mouth daily. Tramaine Richmond MD  
Fish Oil-Vit E-Fat HAMI1- (SUPER OMEGA-3) 400-5 mg-unit cap Take 1 Tab by mouth daily. Tramaine Richmond MD  
 
Allergies Allergen Reactions  Codeine Rash and Itching  Percocet [Oxycodone-Acetaminophen] Nausea and Vomiting  Tramadol Rash and Itching History reviewed. No pertinent family history. Social History Socioeconomic History  Marital status: SINGLE Spouse name: Not on file  Number of children: Not on file  Years of education: Not on file  Highest education level: Not on file Occupational History  Not on file Social Needs  Financial resource strain: Not on file  Food insecurity:  
  Worry: Not on file Inability: Not on file  Transportation needs:  
  Medical: Not on file Non-medical: Not on file Tobacco Use  Smoking status: Never Smoker  Smokeless tobacco: Never Used Substance and Sexual Activity  Alcohol use: Not on file  Drug use: Not on file  Sexual activity: Not on file Lifestyle  Physical activity:  
  Days per week: Not on file Minutes per session: Not on file  Stress: Not on file Relationships  Social connections:  
  Talks on phone: Not on file Gets together: Not on file Attends Jew service: Not on file Active member of club or organization: Not on file Attends meetings of clubs or organizations: Not on file Relationship status: Not on file  Intimate partner violence:  
  Fear of current or ex partner: Not on file Emotionally abused: Not on file Physically abused: Not on file Forced sexual activity: Not on file Other Topics Concern  Not on file Social History Narrative  Not on file Review of Systems: 
 
 
Derm:  Open ulcers bilateral anterior lateral shins. Musc/skeletal: no bone or joint complains. Vasc: No edema, cyanosis or claudication. Objective:  
 
Physical Assessment: 
 
Vitals:  
 02/01/19 1450 BP: 143/66 Pulse: 82 Resp: 17 Temp: 98.2 °F (36.8 °C) SpO2: 100% Lower Extremity Exam: 
 
Vascular Exam: 
Dorsalis Pedis Pulse: 2/4 bilateral lower extremities Posterior Tibial Pulse: 0/4 Bilateral Lower Extremities Capillary Refill is < 3 seconds Bilateral Lower Extremities Temperature of extremity from proximal to distal is warm and perfused Bilateral Lower Extremities Integumentary Exam: 
Skin Texture is WNL Bilateral Lower Extremities Pedal Hair is present Bilateral Lower Extremities Examination of lower extremity reveals open ulcers bilateral anterior lateral shins. Etiology: Started as a blister on left leg, increased in size, then later occurred to the right leg - Wound Description: Wound Type: Venous stasis Indication:   Chronic >30days Status: initial  improving  no change  deteriorating Measurements: 
  Wound Length: 10.6 cm, 8.8 cm Wound Width :11 cm, 9.5 cm Wound Depth :0.4, 0.4 Tissue Type: Ulcer bed: yellow fibrotic base Periwound: Edematous, dry. Exudate:Green, serosanguinous drainage noted Epithelial: with out necrosis Granulation: with out necrosis Subcutaneous: with out necrosis Slough: yes Eschar: no 
Tendon: exposed no -with out necrosis Fascia: exposed no -with out necrosis Muscle: exposed no -with out necrosis Bone: exposed no -with out necrosis Drainage: Stable Debridement:  --not required Infection: no 
Edema:  yes  Both lower extremities Status: increased Compression: no 
 
Offloading:  No  
Nicotine/Tobacco Use:  No 
 
 
Orthopedic Exam: Musculoskeletal Exam: Muscle strength is 5/5 Bilateral Lower Extremities. Tendon, Muscle and Bone and joints and WNL Bilateral Lower Extremities. Range of motion and strength noted in both ankles is WNL. Neurological Exam: Ankle deep tendon reflexes: Are Decreased Bilateral Lower Extremities Epicritic and Protective Sensation: Are Decreased Bilateral Lower Extremities Evaluation of lower extremity nerves: Are Decreased as seen with 5.07 mm monofilament at 5 points per foot. Laboratory Results: 
@ Basic Metabolic Profile@ Lab Results Component Value Date  08/13/2018 CO2 34 (H) 08/13/2018 BUN 12 08/13/2018 Data Review: No results found for this or any previous visit (from the past 24 hour(s)). Assessment:  
 
70 y.o. female with Patient Active Problem List  
Diagnosis Code  Rhabdomyolysis M62.82  
 Lactic acidosis E87.2  Leg ulcer ([address] L97.909  Venous insufficiency I87.2  
 HTN (hypertension) I10  
 Hypercholesteremia E78.00  
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  
 Cellulitis L03.90  Chronic venous hypertension with ulcer involving left side I87.312, L97.929  
 Ulcer of right pretibial region, with fat layer exposed ([address] L97.812  
 Skin ulcer of popliteal region, left, with fat layer exposed ([address] V33.279  
 Skin infection L08.9 Type II Diabetes. Open ulcer- bilateral lower extremities Venous insufficiency with lower extremity edema bilateral lower extremities. Recommendation:  
 
Plan:  
 
Plan/Procedure: 
Needs : 
 
Good local wound care In wound care clinic today: 
Cleanse wound with commercial wound cleanser Apply the following topically to wound bed: NA Apply the following to stef-wound: NA Apply the following dressings: NA  
 
 
 
Patient to return for wound care in: 7 Days Follow up with Nurse visit as recommended. PLEASE CONTACT OFFICE AS SOON AS POSSIBLE IF UNABLE TO MAKE THIS APPOINTMENT. Inspect your wounds, looking for signs of infection which may include the following:  Increase in redness  Red \"streaks\" from wound  Increase in swelling Fever  Unusual odor Change in the amount of wound drainage. Should you experience any significant changes in your wound(s) or have any questions regarding your home care instructions please contact the wound center or your home health company.  If after regular business hours, please call your family doctor or local emergency room. Edema Control:   Elevate legs as much as possible. Avoid standing in one position for more than 10 minutes. Avoid setting with legs down. Do not cross legs while sitting.  
 
 
 
Signed By: Chari Bryant DPM 
  
 April 10, 2019, 10:38 AM

## 2019-04-10 NOTE — PROGRESS NOTES
[address] Subjective: Chief Complaint: Axel Thomas is a 70 y.o. NIDDM female who returns to [facility] today, for continued treatment of open ulcers on both of her legs. She states they started two years ago as small blisters then got bigger. She sees Dr. Elizabet Deras for care of her diabetes and her last visit was in 01/2019. Her FBS this AM was 138. Past Medical History:  
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 Current Medications: 
Prior to Admission medications Medication Sig Start Date End Date Taking? Authorizing Provider  
amLODIPine (NORVASC) 5 mg tablet Take 1 Tab by mouth daily. 8/13/18   Brad Irving MD  
trospium (SANCTURA XL) 60 mg capsule Take 60 mg by mouth Daily (before breakfast). Provider, Historical  
simvastatin (ZOCOR) 20 mg tablet Take 20 mg by mouth nightly. Provider, Historical  
aspirin delayed-release 81 mg tablet Take 81 mg by mouth daily. Tramaine Richmond MD  
gabapentin (NEURONTIN) 100 mg capsule Take 100 mg by mouth three (3) times daily. Tramaine Richmond MD  
glipiZIDE SR (GLUCOTROL XL) 10 mg CR tablet Take 10 mg by mouth daily. Tramaine Richmond MD  
multivitamin (ONE A DAY) tablet Take 1 Tab by mouth daily. Tramaine Richmond MD  
omeprazole (PRILOSEC) 20 mg capsule Take 20 mg by mouth daily. Tramaine Richmond MD  
polyethylene glycol (MIRALAX) 17 gram/dose powder Take 17 g by mouth daily. Tramaine Richmond MD  
SITagliptin (JANUVIA) 100 mg tablet Take 100 mg by mouth daily. Tramaine Richmond MD  
Fish Oil-Vit E-Fat GJJJ2- (SUPER OMEGA-3) 400-5 mg-unit cap Take 1 Tab by mouth daily. Tramaine Richmond MD  
 
Allergies Allergen Reactions  Codeine Rash and Itching  Percocet [Oxycodone-Acetaminophen] Nausea and Vomiting  Tramadol Rash and Itching No family history on file. Social History Socioeconomic History  Marital status: SINGLE Spouse name: Not on file  Number of children: Not on file  Years of education: Not on file  Highest education level: Not on file Occupational History  Not on file Social Needs  Financial resource strain: Not on file  Food insecurity:  
  Worry: Not on file Inability: Not on file  Transportation needs:  
  Medical: Not on file Non-medical: Not on file Tobacco Use  Smoking status: Never Smoker  Smokeless tobacco: Never Used Substance and Sexual Activity  Alcohol use: Not on file  Drug use: Not on file  Sexual activity: Not on file Lifestyle  Physical activity:  
  Days per week: Not on file Minutes per session: Not on file  Stress: Not on file Relationships  Social connections:  
  Talks on phone: Not on file Gets together: Not on file Attends [employer] service: Not on file Active member of club or organization: Not on file Attends meetings of clubs or organizations: Not on file Relationship status: Not on file  Intimate partner violence:  
  Fear of current or ex partner: Not on file Emotionally abused: Not on file Physically abused: Not on file Forced sexual activity: Not on file Other Topics Concern  Not on file Social History Narrative  Not on file Review of Systems: 
 
 
Derm:  Open ulcers bilateral anterior lateral shins. Musc/skeletal: no bone or joint complains. Vasc: No edema, cyanosis or claudication. Objective:  
 
Physical Assessment: 
 
Vitals:  
 02/15/19 1319 BP: 130/56 Pulse: 81 Resp: 18 Temp: 98.7 °F (37.1 °C) SpO2: 100% Weight: 88.5 kg (195 lb) Height: 5' 7\" (1.702 m) Lower Extremity Exam: 
 
Vascular Exam: Dorsalis Pedis Pulse: 2/4 bilateral lower extremities Posterior Tibial Pulse: 0/4 Bilateral Lower Extremities Capillary Refill is < 3 seconds Bilateral Lower Extremities Temperature of extremity from proximal to distal is warm and perfused Bilateral Lower Extremities Integumentary Exam: 
Skin Texture is WNL Bilateral Lower Extremities Pedal Hair is present Bilateral Lower Extremities Examination of lower extremity reveals open ulcers bilateral anterior lateral shins. Etiology: Started as a blister on left leg, increased in size, then later occurred to the right leg - Wound Description: Wound Type: Venous stasis Indication:   Chronic >30days Status: initial  improving  no change  deteriorating Measurements: 
  Wound Length: 10.2 cm, 8 cm Wound Width :12.3 cm, 6.8 cm Wound Depth :0.4, 0.2 Tissue Type: Ulcer bed: yellow/red base (50/50) Periwound: Edematous, dry. Exudate:Green, serosanguinous drainage noted Epithelial: with out necrosis Granulation: with out necrosis Subcutaneous: with out necrosis Slough: yes Eschar: no 
Tendon: exposed no -with out necrosis Fascia: exposed no -with out necrosis Muscle: exposed no -with out necrosis Bone: exposed no -with out necrosis Drainage: Stable Debridement:  --not required Infection: no 
Edema:  yes  Both lower extremities Status: increased Compression: no 
 
Offloading:  No  
Nicotine/Tobacco Use:  No 
 
 
Orthopedic Exam: Musculoskeletal Exam: Muscle strength is 5/5 Bilateral Lower Extremities. Tendon, Muscle and Bone and joints and WNL Bilateral Lower Extremities. Range of motion and strength noted in both ankles is WNL. Neurological Exam: Ankle deep tendon reflexes: Are Decreased Bilateral Lower Extremities Epicritic and Protective Sensation: Are Decreased Bilateral Lower Extremities Evaluation of lower extremity nerves: Are Decreased as seen with 5.07 mm monofilament at 5 points per foot. Laboratory Results: 
@ Basic Metabolic Profile@ Lab Results Component Value Date  08/13/2018 CO2 34 (H) 08/13/2018 BUN 12 08/13/2018 Data Review: No results found for this or any previous visit (from the past 24 hour(s)). Assessment:  
 
70 y.o. female with Patient Active Problem List  
Diagnosis Code  Rhabdomyolysis M62.82  
 Lactic acidosis E87.2  Leg ulcer ([address] L97.909  Venous insufficiency I87.2  
 HTN (hypertension) I10  
 Hypercholesteremia E78.00  
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  
 Cellulitis L03.90  Chronic venous hypertension with ulcer involving left side I87.312, L97.929  
 Ulcer of right pretibial region, with fat layer exposed ([address] L97.812  
 Skin ulcer of popliteal region, left, with fat layer exposed ([address] P61.509  
 Skin infection L08.9 Type II Diabetes. Open ulcer- bilateral lower extremities Venous insufficiency with lower extremity edema bilateral lower extremities. Recommendation:  
 
Plan:  
 
Plan/Procedure: Will consider OR debridement and grafting Needs : 
 
Good local wound care In wound care clinic today: 
Cleanse wound with commercial wound cleanser Apply the following topically to wound bed: Santyl, Mepatel Apply the following to stef-wound: NA Apply the following dressings: Soft roll, Coban Patient to return for wound care in: 7 Days Follow up with Nurse visit as recommended. PLEASE CONTACT OFFICE AS SOON AS POSSIBLE IF UNABLE TO MAKE THIS APPOINTMENT. Inspect your wounds, looking for signs of infection which may include the following:  Increase in redness  Red \"streaks\" from wound  Increase in swelling Fever  Unusual odor Change in the amount of wound drainage.  Should you experience any significant changes in your wound(s) or have any questions regarding your home care instructions please contact the wound center or your home health company. If after regular business hours, please call your family doctor or local emergency room. Edema Control:   Elevate legs as much as possible. Avoid standing in one position for more than 10 minutes. Avoid setting with legs down. Do not cross legs while sitting.  
 
 
 
Signed By: Kristine Morales DPM 
  
 April 10, 2019, 10:38 AM

## 2019-04-10 NOTE — PROGRESS NOTES
[address] [address]    Subjective:         Chief Complaint: Saumya Villa is a 70 y.o. NIDDM female who presents to [facility] today, for treatment of open chronic ulcers on both of her legs; the left started a year and a half ago and the right leg started five months ago. She states they started as small blisters with increasing swelling. She also states she was going to the clinic at [facility]. She sees Dr. Urbano Cade for care of her diabetes and her last visit was in 09/2018. Her FBS this AM was 96. 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 Current Medications:  Prior to Admission medications    Medication Sig Start Date End Date Taking? Authorizing Provider   amLODIPine (NORVASC) 5 mg tablet Take 1 Tab by mouth daily. 8/13/18  Yes Amparo Buchanan MD   trospium (SANCTURA XL) 60 mg capsule Take 60 mg by mouth Daily (before breakfast). Yes Provider, Historical   simvastatin (ZOCOR) 20 mg tablet Take 20 mg by mouth nightly. Yes Provider, Historical   aspirin delayed-release 81 mg tablet Take 81 mg by mouth daily. Yes Other, MD Tramaine   gabapentin (NEURONTIN) 100 mg capsule Take 100 mg by mouth three (3) times daily. Yes Basilio, MD Tramaine   glipiZIDE SR (GLUCOTROL XL) 10 mg CR tablet Take 10 mg by mouth daily. Yes Basilio, MD Tramaine   multivitamin (ONE A DAY) tablet Take 1 Tab by mouth daily. Yes Basilio, MD Tramaine   omeprazole (PRILOSEC) 20 mg capsule Take 20 mg by mouth daily. Yes Basilio, MD Tramaine   polyethylene glycol (MIRALAX) 17 gram/dose powder Take 17 g by mouth daily. Yes Basilio, MD Tramaine   SITagliptin (JANUVIA) 100 mg tablet Take 100 mg by mouth daily.    Yes Basilio, MD Tramaine   Fish Oil-Vit E-Fat VBCC6- (SUPER OMEGA-3) 400-5 mg-unit cap Take 1 Tab by mouth daily. Yes Other, MD Tramaine     Allergies   Allergen Reactions    Codeine Rash and Itching    Percocet [Oxycodone-Acetaminophen] Nausea and Vomiting    Tramadol Rash and Itching     No family history on file. Social History     Socioeconomic History    Marital status: SINGLE     Spouse name: Not on file    Number of children: Not on file    Years of education: Not on file    Highest education level: Not on file   Occupational History    Not on file   Social Needs    Financial resource strain: Not on file    Food insecurity:     Worry: Not on file     Inability: Not on file    Transportation needs:     Medical: Not on file     Non-medical: Not on file   Tobacco Use    Smoking status: Never Smoker    Smokeless tobacco: Never Used   Substance and Sexual Activity    Alcohol use: Not on file    Drug use: Not on file    Sexual activity: Not on file   Lifestyle    Physical activity:     Days per week: Not on file     Minutes per session: Not on file    Stress: Not on file   Relationships    Social connections:     Talks on phone: Not on file     Gets together: Not on file     Attends [employer] service: Not on file     Active member of club or organization: Not on file     Attends meetings of clubs or organizations: Not on file     Relationship status: Not on file    Intimate partner violence:     Fear of current or ex partner: Not on file     Emotionally abused: Not on file     Physically abused: Not on file     Forced sexual activity: Not on file   Other Topics Concern    Not on file   Social History Narrative    Not on file       Review of Systems:    Gen: No fever, chills, malaise, weight loss/gain. Heent: No headache, rhinorrhea, epistaxis, ear pain, hearing loss, sinus pain, neck pain/stiffness, sore throat. Heart: No chest pain, palpitations, GEE, pnd, or orthopnea.    Resp: No cough, hemoptysis, wheezing and shortness of breath. GI: No nausea, vomiting, diarrhea, constipation, melena or hematochezia. : No urinary obstruction, dysuria or hematuria. Derm:  Ulcers bilateral lower extremities. Musc/skeletal: no bone or joint complains. Vasc: positive edema bilateral lower extremities   Endo: No heat/cold intolerance, no polyuria,polydipsia or polyphagia. Neuro: No unilateral weakness, numbness, tingling. No seizures. Heme: No easy bruising or bleeding.       Objective:     Physical Assessment:    Vitals:    12/14/18 1027   BP: 160/79   Pulse: 92   Resp: 17   Temp: 97.9 °F (36.6 °C)   SpO2: 99%     Lower Extremity Exam:    Vascular Exam:  Dorsalis Pedis Pulse: 2/4  Bilateral Lower Extremities  Posterior Tibial Pulse: 0/4 Bilateral Lower Extremities  Capillary Refill is < 3 seconds Bilateral Lower Extremities  Temperature of extremity from proximal to distal is warm and perfused Bilateral Lower Extremities  Recent VANDANA results: None     Integumentary Exam:  Skin Texture is WNL Bilateral Lower Extremities  Pedal Hair is present Bilateral Lower Extremities  Examination of lower extremity reveals open ulcers bilateral lower extremities-  Etiology: Started as a blister -    Wound Description:   Wound Type: Venous stasis    Indication:   Chronic >30days   Status:  no change     Measurements:    Wound Length: 10.8 cm, 7.8 cm      Wound Width :11.2 cm, 6 cm      Wound Depth :0.2, 0.2     Tissue Type: Ulcer bed:  yellow fibrotic base    Periwound: Surrounding erythema with intact dermal layer    Exudate: serosanguinous drainage noted  Epithelial: with out necrosis  Granulation: with out necrosis  Subcutaneous: with out necrosis  Slough: yes   Eschar: no  Tendon: exposed no -with out necrosis  Fascia: exposed no -with out necrosis  Muscle: exposed no -with out necrosis  Bone: exposed no -with out necrosis   Drainage: Stable      Debridement: R  --not required    Infection: no   Edema: yes    Both lower extremities   Status: increased   Edema is being managed with: compressive dressings    Patient was educated on the importance of edema control today    Compression: yes   Status:  Start     Type: Geerosina Barbara Frias   Patient Compliant:  yes     Offloading:  No  Nicotine/Tobacco Use:  No        Orthopedic Exam:  Musculoskeletal Exam: Muscle strength is 5/5 Bilateral Lower Extremities. Tendon, Muscle and Bone and joints and WNL Bilateral Lower Extremities. Range of motion and strength noted in both ankles is WNL. Neurological Exam:  Ankle deep tendon reflexes: Are Decreased Bilateral Lower Extremities  Epicritic and Protective Sensation: Are Decreased Bilateral Lower Extremities  Evaluation of lower extremity nerves: Are Decreased as seen with 5.07 mm monofilament at 5 points per foot. Laboratory Results:  @ Basic Metabolic Profile@  Lab Results   Component Value Date     08/13/2018    CO2 34 (H) 08/13/2018    BUN 12 08/13/2018       Data Review: No results found for this or any previous visit (from the past 24 hour(s)). Assessment:     70 y.o. female with   Patient Active Problem List   Diagnosis Code    Rhabdomyolysis M62.82    Lactic acidosis E87.2    Leg ulcer ([address] L97.909    Venous insufficiency I87.2    HTN (hypertension) I10    Hypercholesteremia E78.00    DM2 (diabetes mellitus, type 2) ([facility] E11.9    Chronic ulcer of left lower extremity with fat layer exposed ([address] L97.922    Cellulitis L03.90    Chronic venous hypertension with ulcer involving left side I87.312, L97.929    Ulcer of right pretibial region, with fat layer exposed ([address] L97.812    Skin ulcer of popliteal region, left, with fat layer exposed ([address] D24.178    Skin infection L08.9       Type II Diabetes   Open ulcer bilateral lower extremities  Venous insufficiency with lower extremity edema bilateral lower extremities.     Recommendation:     Plan:     Plan/Procedure:    Dispense prescription for VANDANA test- will consider graft. Needs :    Good local wound care  Edema management      In wound care clinic today:  Cleanse wound with commercial wound cleanser  Apply the following topically to wound bed:   M[place]alt   Apply the following to stef-wound: NA   Apply the following dressings: UNNA boot  gauze        Patient to return for wound care in:   Days  Follow up with Nurse visit as recommended. PLEASE CONTACT OFFICE AS SOON AS POSSIBLE IF UNABLE TO MAKE THIS APPOINTMENT. Inspect your wounds, looking for signs of infection which may include the following:  Increase in redness  Red \"streaks\" from wound  Increase in swelling Fever  Unusual odor Change in the amount of wound drainage. Should you experience any significant changes in your wound(s) or have any questions regarding your home care instructions please contact the wound center or your home health company. If after regular business hours, please call your family doctor or local emergency room. Edema Control:   Elevate legs as much as possible. Avoid standing in one position for more than 10 minutes. Avoid setting with legs down. Do not cross legs while sitting.           Signed By: Neal Thomas DPM      April 10, 2019, 2:35 PM

## 2019-04-12 ENCOUNTER — HOSPITAL ENCOUNTER (OUTPATIENT)
Dept: WOUND CARE | Age: 79
End: 2019-04-12

## 2019-04-22 NOTE — WOUND CARE
02/26/19 1620 Wound Leg Lower Left;Lateral  
Date First Assessed/Time First Assessed: 10/25/18 1530   POA: Yes  Wound Type: Venous  Location: Leg Lower  Orientation: Left;Lateral  
Dressing Status  Breakthrough drainage Non-Pressure Injury Full thickness (subcut/muscle) Wound Length (cm) 10.2 cm Wound Width (cm) 12.3 cm Wound Depth (cm) 0.4 Wound Surface area (cm^2) 125.46 cm^2 Change in Wound Size % 4.95 Condition of Base [place];Slough;Granulation Condition of Edges Open Tissue Type Pink;Red;Yellow Tissue Type Percent Pink 20 Tissue Type Percent Red 40 Tissue Type Percent Yellow 40 Drainage Amount  Large Drainage Color Serosanguinous Wound Odor Mild Periwound Skin Condition Edematous; Intact Cleansing and Cleansing Agents  Normal saline Dressing Type Applied Unna boot 
(santyl, aquacell ag, [employer]) Procedure Tolerated Well Wound Leg Lower Right; Anterior;Superior Date First Assessed/Time First Assessed: 10/25/18 1533   POA: Yes  Wound Type: Venous  Location: Leg Lower  Orientation: Right; Anterior;Superior Dressing Status  Breakthrough drainage Dressing Type  Absorptive Non-Pressure Injury Full thickness (subcut/muscle) Wound Length (cm) 8 cm Wound Width (cm) 6.8 cm Wound Depth (cm) 0.2 Wound Surface area (cm^2) 54.4 cm^2 Change in Wound Size % -80.73 Condition of Base Granulation;Slough;Pink Condition of Edges Open Tissue Type Pink;Red;Yellow Tissue Type Percent Red 30 Tissue Type Percent Yellow 70 Drainage Amount  Moderate Drainage Color Serosanguinous Wound Odor Mild Periwound Skin Condition Edematous Cleansing and Cleansing Agents  Normal saline Dressing Type Applied Unna boot 
(santylCDB [employer] ag, [employer]) Procedure Tolerated Well

## 2019-04-26 ENCOUNTER — HOSPITAL ENCOUNTER (OUTPATIENT)
Dept: WOUND CARE | Age: 79
Discharge: HOME | End: 2019-04-26
Payer: MEDICARE

## 2019-04-26 PROCEDURE — 97597 DBRDMT OPN WND 1ST 20 CM/<: CPT

## 2019-04-26 PROCEDURE — 29580 STRAPPING UNNA BOOT: CPT

## 2019-04-29 VITALS
HEART RATE: 78 BPM | OXYGEN SATURATION: 100 % | SYSTOLIC BLOOD PRESSURE: 153 MMHG | TEMPERATURE: 98.1 F | DIASTOLIC BLOOD PRESSURE: 67 MMHG | RESPIRATION RATE: 18 BRPM

## 2019-04-29 NOTE — WOUND CARE
04/29/19 1449   Wound Leg Lower Left;Lateral   Date First Assessed/Time First Assessed: 10/25/18 1530   POA: Yes  Wound Type: Venous  Location: Leg Lower  Orientation: Left;Lateral   Dressing Status  Clean, dry, and intact   Dressing Type  Absorptive; Unna boot   Non-Pressure Injury Full thickness (subcut/muscle)   Wound Length (cm) 10.3 cm   Wound Width (cm) 10.5 cm   Wound Depth (cm) 0.5   Wound Surface area (cm^2) 108.15 cm^2   Change in Wound Size % 18.07   Condition of Base Pink;Slough   Condition of Edges Open   Tissue Type Pink;Yellow   Drainage Amount  Moderate   Drainage Color Serosanguinous; Tan   Wound Odor Mild   Periwound Skin Condition Edematous; Intact   Cleansing and Cleansing Agents  Other (comment); Soap and water  (vashe)   Dressing Type Applied Other (Comment)  (Santyl, Hydroferra blue, exu-dry, ricky, unna boot, coban)   Wound Procedure Type Remove Slough   Consent Obtained  Yes   Procedure Bleeding Minimal   Procedure Hemostasis  Pressure   Type of Tissue Removed  Devitalized   Procedure Instrument  Curette   Procedure Pain Scale Numeric 4/10   Debridement Procedure Performed by   (Dr. Macho Grissom)   Post-Procedure Length (cm) 10.5 cm   Post-Procedure Width (cm) 10.5 cm   Post-Procedure Depth (cm) 0.5 cm   Post-Procedure Volume (cm^3) 55.12 cm^3   Post-Procedure Surface Area (cm^2) 110.25 cm^2   Procedure Tolerated Well   Wound Leg Lower Right; Anterior;Superior   Date First Assessed/Time First Assessed: 10/25/18 1533   POA: Yes  Wound Type: Venous  Location: Leg Lower  Orientation: Right; Anterior;Superior   Dressing Status  Clean, dry, and intact   Dressing Type  Absorptive; Unna boot   Non-Pressure Injury Full thickness (subcut/muscle)   Wound Length (cm) 9.5 cm   Wound Width (cm) 9.3 cm   Wound Depth (cm) 0.3   Wound Surface area (cm^2) 88.35 cm^2   Change in Wound Size % -193.52   Condition of Base [place];Slough;Granulation   Condition of Edges Open   Tissue Type Red;Pink;Yellow   Drainage Amount  Moderate   Drainage Color Serosanguinous; Tan   Wound Odor Musty;Mild   Periwound Skin Condition Edematous; Intact   Cleansing and Cleansing Agents  Other (comment); Soap and water  (vashe)   Dressing Type Applied Other (Comment)  (Santyl, Hydroferra blue, exu-dry, ricky, unna boot, coban)   Wound Procedure Type Remove Slough   Consent Obtained  Yes   Procedure Bleeding Moderate   Procedure Hemostasis  Pressure   Type of Tissue Removed  Devitalized   Procedure Instrument  Curette   Procedure Pain Scale Numeric 4/10   Debridement Procedure Performed by   (Dr. Jennifer Ruiz)   Post-Procedure Length (cm) 9.5 cm   Post-Procedure Width (cm) 9.5 cm   Post-Procedure Depth (cm) 0.3 cm   Post-Procedure Volume (cm^3) 27.08 cm^3   Post-Procedure Surface Area (cm^2) 90.25 cm^2   Assisted Physcian in Procedure  No   Procedure Tolerated Fair           Post-debridement photos:

## 2019-05-17 ENCOUNTER — HOSPITAL ENCOUNTER (OUTPATIENT)
Dept: WOUND CARE | Age: 79
Discharge: HOME | End: 2019-05-17
Attending: PODIATRIST
Payer: MEDICARE

## 2019-05-17 VITALS
RESPIRATION RATE: 18 BRPM | SYSTOLIC BLOOD PRESSURE: 152 MMHG | HEART RATE: 96 BPM | DIASTOLIC BLOOD PRESSURE: 78 MMHG | OXYGEN SATURATION: 96 % | TEMPERATURE: 98.3 F

## 2019-05-17 PROCEDURE — 29580 STRAPPING UNNA BOOT: CPT

## 2019-05-17 NOTE — DISCHARGE INSTRUCTIONS
Cleanse wound with NS or soap and water or commercial wound cleanser  Apply the following topically to wound bed: Santyl, Hydrofera blue to right; cutimed sorbact, calcium alginate to left  Apply the following to stef-wound: barrier cream   Apply the following dressings: Dry absorbent dressing (no abd pads per MD), unna boot, coban

## 2019-05-21 NOTE — PROGRESS NOTES
[address]    Subjective:         Chief Complaint: Anthony Bowen is a 70 y.o. NIDDM female who returns to [facility] today, for continued treatment of open ulcers on both of her legs. She states they started over a year ago as small blisters then got bigger. She sees Dr. Edvin Franks for care of her diabetes and her FBS this AM was 113. 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 Current Medications:  Prior to Admission medications    Medication Sig Start Date End Date Taking? Authorizing Provider   amLODIPine (NORVASC) 5 mg tablet Take 1 Tab by mouth daily. 8/13/18   Genet Mays MD   trospium (SANCTURA XL) 60 mg capsule Take 60 mg by mouth Daily (before breakfast). Provider, Historical   simvastatin (ZOCOR) 20 mg tablet Take 20 mg by mouth nightly. Provider, Historical   aspirin delayed-release 81 mg tablet Take 81 mg by mouth daily. Tramaine Richmond MD   gabapentin (NEURONTIN) 100 mg capsule Take 100 mg by mouth three (3) times daily. rTamaine Richmond MD   glipiZIDE SR (GLUCOTROL XL) 10 mg CR tablet Take 10 mg by mouth daily. Tramaine Richmond MD   multivitamin (ONE A DAY) tablet Take 1 Tab by mouth daily. Tramaine Richmond MD   omeprazole (PRILOSEC) 20 mg capsule Take 20 mg by mouth daily. Tramaine Richmond MD   polyethylene glycol (MIRALAX) 17 gram/dose powder Take 17 g by mouth daily. Tramaine Richmond MD   SITagliptin (JANUVIA) 100 mg tablet Take 100 mg by mouth daily. Tramaine Richmond MD   Fish Oil-Vit E-Fat IRJH1- (SUPER OMEGA-3) 400-5 mg-unit cap Take 1 Tab by mouth daily.     Tramaine Richmond MD     Allergies   Allergen Reactions    Codeine Rash and Itching    Percocet [Oxycodone-Acetaminophen] Nausea and Vomiting    Tramadol Rash and Itching     No family history on file. Social History     Socioeconomic History    Marital status: SINGLE     Spouse name: Not on file    Number of children: Not on file    Years of education: Not on file    Highest education level: Not on file   Occupational History    Not on file   Social Needs    Financial resource strain: Not on file    Food insecurity:     Worry: Not on file     Inability: Not on file    Transportation needs:     Medical: Not on file     Non-medical: Not on file   Tobacco Use    Smoking status: Never Smoker    Smokeless tobacco: Never Used   Substance and Sexual Activity    Alcohol use: Not on file    Drug use: Not on file    Sexual activity: Not on file   Lifestyle    Physical activity:     Days per week: Not on file     Minutes per session: Not on file    Stress: Not on file   Relationships    Social connections:     Talks on phone: Not on file     Gets together: Not on file     Attends Moravian service: Not on file     Active member of club or organization: Not on file     Attends meetings of clubs or organizations: Not on file     Relationship status: Not on file    Intimate partner violence:     Fear of current or ex partner: Not on file     Emotionally abused: Not on file     Physically abused: Not on file     Forced sexual activity: Not on file   Other Topics Concern    Not on file   Social History Narrative    Not on file       Review of Systems:      Derm:  Open ulcers bilateral anterior lateral shins. Musc/skeletal: no bone or joint complains. Vasc: No edema, cyanosis or claudication.          Objective:     Physical Assessment:    Vitals:    04/26/19 1446   BP: 153/67   Pulse: 78   Resp: 18   Temp: 98.1 °F (36.7 °C)   SpO2: 100%     Lower Extremity Exam:    Vascular Exam:  Dorsalis Pedis Pulse: 2/4 bilateral lower extremities  Posterior Tibial Pulse: 0/4 Bilateral Lower Extremities  Capillary Refill is < 3 seconds Bilateral Lower Extremities  Temperature of extremity from proximal to distal is warm and perfused Bilateral Lower Extremities  Recent VANDANA results: (02/26/2018) R = 1.41, L = 1.39     Integumentary Exam:  Skin Texture is WNL Bilateral Lower Extremities  Pedal Hair is present Bilateral Lower Extremities  Examination of lower extremity reveals open ulcers bilateral anterior lateral shins. Etiology: Started as a blister on left leg, increased in size, then later occurred to the right leg -    Wound Description:   Wound Type: Venous stasis     Indication:   Chronic >30days   Status:  no change       Measurements:    Wound Length: 10.3 cm, 9.5 cm      Wound Width :10.5 cm, 9.3 cm      Wound Depth :0.5, 0.3     Tissue Type: Ulcer bed: yellow fibrotic base with calcium deposits in right leg ulcer    Periwound: Intact with erythema    Exudate: serosanguinous drainage noted  Epithelial: with out necrosis  Granulation: with out necrosis  Subcutaneous: with out necrosis  Slough: yes  Eschar: no  Tendon: exposed no -with out necrosis  Fascia: exposed no -with out necrosis  Muscle: exposed no -with out necrosis  Bone: exposed no -with out necrosis   Drainage: Stable      Debridement:  Required today to promote wound healing    Infection: no  Edema:  yes  Both lower extremities   Status: controlled     Compression: no    Offloading:  No   Nicotine/Tobacco Use:  No      Orthopedic Exam:  Musculoskeletal Exam: Muscle strength is 5/5 Bilateral Lower Extremities. Tendon, Muscle and Bone and joints and WNL Bilateral Lower Extremities. Range of motion and strength noted in both ankles is WNL. Neurological Exam:  Ankle deep tendon reflexes: Are Decreased Bilateral Lower Extremities  Epicritic and Protective Sensation: Are Decreased Bilateral Lower Extremities  Evaluation of lower extremity nerves: Are Decreased as seen with 5.07 mm monofilament at 5 points per foot.       Laboratory Results:  @ Basic Metabolic Profile@  Lab Results   Component Value Date     08/13/2018    CO2 34 (H) 08/13/2018    BUN 12 08/13/2018       Data Review: No results found for this or any previous visit (from the past 24 hour(s)). Assessment:     70 y.o. female with   Patient Active Problem List   Diagnosis Code    Rhabdomyolysis M62.82    Lactic acidosis E87.2    Leg ulcer ([address] L97.909    Venous insufficiency I87.2    HTN (hypertension) I10    Hypercholesteremia E78.00    DM2 (diabetes mellitus, type 2) ([facility]) E11.9    Chronic ulcer of left lower extremity with fat layer exposed ([address] L97.922    Cellulitis L03.90    Chronic venous hypertension with ulcer involving left side I87.312, L97.929    Ulcer of right pretibial region, with fat layer exposed ([address] L97.812    Skin ulcer of popliteal region, left, with fat layer exposed ([address] V75.212    Skin infection L08.9       Type II Diabetes. Open ulcer- bilateral lower extremities  Venous insufficiency with lower extremity edema bilateral lower extremities. Recommendation:     Plan:     Plan/Procedure:    Ulcer Debridement    Ulcer Number 2; Bilateral Venous  Other part of lower leg To Fat Layer    Character of Ulcer: Unchanged     Indication for Debridement: Slough, Exudate and Abnormal Wound base     Pre debridement measurements:    Wound Length: 10.3 cm, 9.5 cm      Wound Width :10.5 cm, 9.3 cm      Wound Depth :0.5, 0.3    Risks of procedure were discussed with patient. Consent has been signed.      Anesthetic: None     Level of Debridement: Devitalized/ Non - viable Tissue     Tissue and/or Material removed: Fibrin/ Slough    Type of Tissue: Non- Viable      Pre-debridement severity: Fat Layer Exposed     Post debridement severity: Fat Layer exposed    Instrument(s) used: Curette    Bleeding controlled with: Pressure    Estimated blood loss: Minimal    Post debridement measurements:Measurements:    Wound Length: 10.5 cm, 9.5 cm      Wound Width :10.5 cm, 9.5 cm      Wound Depth :0.5, 0.3    Post procedural pain: 4 /10    Patient tolerated procedure well. Noted \"calcium deposit\" in right leg ulcer with debridement- rule out Calciphylaxis    Dispense prescription for Santyl  Dispense prescription for Vascular Referral to Dr. Joaquín Brown for venous work up. Needs :    Good local wound care      In wound care clinic today:  Cleanse wound with commercial wound cleanser  Apply the following topically to wound bed: Santyl, Hydroferra Blue    Apply the following to stef-wound: NA  Apply the following dressings: Exudry, Unnaboot        Patient to return for wound care in: 7 Days  Follow up with Nurse visit as recommended. PLEASE CONTACT OFFICE AS SOON AS POSSIBLE IF UNABLE TO MAKE THIS APPOINTMENT. Inspect your wounds, looking for signs of infection which may include the following:  Increase in redness  Red \"streaks\" from wound  Increase in swelling Fever  Unusual odor Change in the amount of wound drainage. Should you experience any significant changes in your wound(s) or have any questions regarding your home care instructions please contact the wound center or your home health company. If after regular business hours, please call your family doctor or local emergency room. Edema Control:   Elevate legs as much as possible. Avoid standing in one position for more than 10 minutes. Avoid setting with legs down. Do not cross legs while sitting.         Signed By: Madeline Wells DPM      May 21, 2019, 10:38 AM

## 2019-05-22 NOTE — PROGRESS NOTES
[address]    Subjective:         Chief Complaint: Xavier Mendez is a 70 y.o. NIDDM female who returns to [facility] today, for continued treatment of open ulcers on both of her legs. She states they started two years and are not getting any better. She also states she has home health three times a week for dressing changes on both legs. .  She sees Dr. Eliza Alegria for care of her diabetes and her last visit was two weeks ago. Her FBS this AM was 113. 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 Current Medications:  Prior to Admission medications    Medication Sig Start Date End Date Taking? Authorizing Provider   amLODIPine (NORVASC) 5 mg tablet Take 1 Tab by mouth daily. 8/13/18   Omid Escobar MD   trospium (SANCTURA XL) 60 mg capsule Take 60 mg by mouth Daily (before breakfast). Provider, Historical   simvastatin (ZOCOR) 20 mg tablet Take 20 mg by mouth nightly. Provider, Historical   aspirin delayed-release 81 mg tablet Take 81 mg by mouth daily. Tramaine Richmond MD   gabapentin (NEURONTIN) 100 mg capsule Take 100 mg by mouth three (3) times daily. Tramaine Richmond MD   glipiZIDE SR (GLUCOTROL XL) 10 mg CR tablet Take 10 mg by mouth daily. Tramaine Richmond MD   multivitamin (ONE A DAY) tablet Take 1 Tab by mouth daily. Tramaine Richmond MD   omeprazole (PRILOSEC) 20 mg capsule Take 20 mg by mouth daily. Tramaine Richmond MD   polyethylene glycol (MIRALAX) 17 gram/dose powder Take 17 g by mouth daily. Tramaine Richmond MD   SITagliptin (JANUVIA) 100 mg tablet Take 100 mg by mouth daily.     Tramaine Richmond MD   Fish Oil-Vit E-Fat SBKR7- (SUPER OMEGA-3) 400-5 mg-unit cap Take 1 Tab by mouth daily. Other, MD Tramaine     Allergies   Allergen Reactions    Codeine Rash and Itching    Percocet [Oxycodone-Acetaminophen] Nausea and Vomiting    Tramadol Rash and Itching     No family history on file. Social History     Socioeconomic History    Marital status: SINGLE     Spouse name: Not on file    Number of children: Not on file    Years of education: Not on file    Highest education level: Not on file   Occupational History    Not on file   Social Needs    Financial resource strain: Not on file    Food insecurity:     Worry: Not on file     Inability: Not on file    Transportation needs:     Medical: Not on file     Non-medical: Not on file   Tobacco Use    Smoking status: Never Smoker    Smokeless tobacco: Never Used   Substance and Sexual Activity    Alcohol use: Not on file    Drug use: Not on file    Sexual activity: Not on file   Lifestyle    Physical activity:     Days per week: Not on file     Minutes per session: Not on file    Stress: Not on file   Relationships    Social connections:     Talks on phone: Not on file     Gets together: Not on file     Attends Voodoo service: Not on file     Active member of club or organization: Not on file     Attends meetings of clubs or organizations: Not on file     Relationship status: Not on file    Intimate partner violence:     Fear of current or ex partner: Not on file     Emotionally abused: Not on file     Physically abused: Not on file     Forced sexual activity: Not on file   Other Topics Concern    Not on file   Social History Narrative    Not on file       Review of Systems:      Derm:  Open ulcers bilateral anterior lateral shins. Musc/skeletal: no bone or joint complains. Vasc: No edema, cyanosis or claudication.          Objective:     Physical Assessment:    Vitals:    05/17/19 1425   BP: 152/78   Pulse: 96   Resp: 18   Temp: 98.3 °F (36.8 °C)   SpO2: 96%     Lower Extremity Exam:    Vascular Exam:  Dorsalis Pedis Pulse: 2/4 bilateral lower extremities  Posterior Tibial Pulse: 0/4 Bilateral Lower Extremities  Capillary Refill is < 3 seconds Bilateral Lower Extremities  Temperature of extremity from proximal to distal is warm and perfused Bilateral Lower Extremities  Recent VANDANA results: (02/26/2018) R = 1.41, L = 1.39     Integumentary Exam:  Skin Texture is WNL Bilateral Lower Extremities  Pedal Hair is present Bilateral Lower Extremities  Examination of lower extremity reveals open ulcers bilateral anterior lateral shins. Etiology: Started as a blister on left leg, increased in size, then later occurred to the right leg -    Wound Description:   Wound Type: Venous stasis     Indication:   Chronic >30days   Status:  no change       Measurements:    Wound Length: 10 cm, 9 cm      Wound Width :10.5 cm, 7 cm      Wound Depth :0.3, 0.2     Tissue Type: Ulcer bed: R 50% yellow/brown fibrotic base // L 80% red granular     Periwound: Intact with erythema    Exudate: serosanguinous drainage noted  Epithelial: with out necrosis  Granulation: with out necrosis  Subcutaneous: with out necrosis  Slough: yes  Eschar: no  Tendon: exposed no -with out necrosis  Fascia: exposed no -with out necrosis  Muscle: exposed no -with out necrosis  Bone: exposed no -with out necrosis   Drainage: Stable      Debridement:  Not required    Infection: no  Edema:  yes  Both lower extremities   Status: controlled     Compression: no    Offloading:  No   Nicotine/Tobacco Use:  No      Orthopedic Exam:  Musculoskeletal Exam: Muscle strength is 5/5 Bilateral Lower Extremities. Tendon, Muscle and Bone and joints and WNL Bilateral Lower Extremities. Range of motion and strength noted in both ankles is WNL.     Neurological Exam:  Ankle deep tendon reflexes: Are Decreased Bilateral Lower Extremities  Epicritic and Protective Sensation: Are Decreased Bilateral Lower Extremities  Evaluation of lower extremity nerves: Are Decreased as seen with 5.07 mm monofilament at 5 points per foot. Laboratory Results:  @ Basic Metabolic Profile@  Lab Results   Component Value Date     08/13/2018    CO2 34 (H) 08/13/2018    BUN 12 08/13/2018       Data Review: No results found for this or any previous visit (from the past 24 hour(s)). Assessment:     70 y.o. female with   Patient Active Problem List   Diagnosis Code    Rhabdomyolysis M62.82    Lactic acidosis E87.2    Leg ulcer ([address] L97.909    Venous insufficiency I87.2    HTN (hypertension) I10    Hypercholesteremia E78.00    DM2 (diabetes mellitus, type 2) ([facility]) E11.9    Chronic ulcer of left lower extremity with fat layer exposed ([address] L97.922    Cellulitis L03.90    Chronic venous hypertension with ulcer involving left side I87.312, L97.929    Ulcer of right pretibial region, with fat layer exposed ([address] L97.812    Skin ulcer of popliteal region, left, with fat layer exposed ([address] Y39.477    Skin infection L08.9       Type II Diabetes. Open ulcer- bilateral lower extremities  Venous insufficiency with lower extremity edema bilateral lower extremities. Recommendation:     Plan:     Plan/Procedure:    Consult patient on grafting/surgical correction. Needs :    Good local wound care      In wound care clinic today:  Cleanse wound with commercial wound cleanser  Apply the following topically to wound bed: L Cutimed/Calcium alginate; R Santyl, Kena  Apply the following to stef-wound: NA  Apply the following dressings: compressive wraps        Patient to return for wound care in: 7 Days  Follow up with Nurse visit as recommended. PLEASE CONTACT OFFICE AS SOON AS POSSIBLE IF UNABLE TO MAKE THIS APPOINTMENT. Inspect your wounds, looking for signs of infection which may include the following:  Increase in redness  Red \"streaks\" from wound  Increase in swelling Fever  Unusual odor Change in the amount of wound drainage.  Should you experience any significant changes in your wound(s) or have any questions regarding your home care instructions please contact the wound center or your home health company. If after regular business hours, please call your family doctor or local emergency room. Edema Control:   Elevate legs as much as possible. Avoid standing in one position for more than 10 minutes. Avoid setting with legs down. Do not cross legs while sitting.         Signed By: Stefanie York DPM      May 22, 2019, 10:38 AM

## 2019-05-28 NOTE — WOUND CARE
05/17/19 1425   Wound Leg Lower Left;Lateral   Date First Assessed/Time First Assessed: 10/25/18 1530   POA: Yes  Wound Type: Venous  Location: Leg Lower  Orientation: Left;Lateral   Dressing Status  Breakthrough drainage   Dressing Type  Other (Comment)  (Pt changed own dressing)   Non-Pressure Injury Full thickness (subcut/muscle)   Wound Length (cm) 10 cm   Wound Width (cm) 10.5 cm   Wound Depth (cm) 0.3   Wound Surface area (cm^2) 105 cm^2   Change in Wound Size % 20.45   Condition of Base Slough;Pink   Condition of Edges Open   Tissue Type Yellow;Red   Tissue Type Percent Pink 80   Tissue Type Percent Yellow 20   Drainage Amount  Large   Drainage Color Serous   Wound Odor Mild   Periwound Skin Condition Edematous; Intact   Cleansing and Cleansing Agents  Other (comment)  (vashe)   Dressing Type Applied Unna boot  (EPC, cutimed sorbact, alginate, mextra )   Procedure Tolerated Well   Wound Leg Lower Right; Anterior;Superior   Date First Assessed/Time First Assessed: 10/25/18 1533   POA: Yes  Wound Type: Venous  Location: Leg Lower  Orientation: Right; Anterior;Superior   Dressing Status  Intact   Dressing Type  Other (Comment)  (pt changed own dressing )   Non-Pressure Injury Full thickness (subcut/muscle)   Wound Length (cm) 9 cm   Wound Width (cm) 7 cm   Wound Depth (cm) 0.2   Wound Surface area (cm^2) 63 cm^2   Change in Wound Size % -109.3   Condition of Base Slough;Granulation   Condition of Edges Open   Tissue Type Yellow;Red   Tissue Type Percent Red 70   Tissue Type Percent Yellow 30   Drainage Amount  Moderate   Drainage Color Serosanguinous   Wound Odor Mild   Periwound Skin Condition Edematous; Intact   Cleansing and Cleansing Agents  Other (comment)  (vashe)   Dressing Type Applied Unna boot  (ricky, exudry, HFB, santyl)   Procedure Tolerated Well

## 2019-06-14 ENCOUNTER — APPOINTMENT (OUTPATIENT)
Dept: GENERAL RADIOLOGY | Age: 79
End: 2019-06-14
Attending: EMERGENCY MEDICINE
Payer: MEDICARE

## 2019-06-14 ENCOUNTER — HOSPITAL ENCOUNTER (EMERGENCY)
Age: 79
Discharge: HOME | End: 2019-06-15
Attending: EMERGENCY MEDICINE | Admitting: EMERGENCY MEDICINE
Payer: MEDICARE

## 2019-06-14 DIAGNOSIS — L03.115 CELLULITIS OF RIGHT LEG WITHOUT FOOT: Primary | ICD-10-CM

## 2019-06-14 LAB
ANION GAP SERPL CALC-SCNC: 5 MMOL/L (ref 3–18)
BASOPHILS # BLD: 0 K/UL (ref 0–0.1)
BASOPHILS NFR BLD: 0 % (ref 0–2)
BUN SERPL-MCNC: 19 MG/DL (ref 7–18)
BUN/CREAT SERPL: 23 (ref 12–20)
CALCIUM SERPL-MCNC: 9 MG/DL (ref 8.5–10.1)
CHLORIDE SERPL-SCNC: 106 MMOL/L (ref 100–108)
CO2 SERPL-SCNC: 29 MMOL/L (ref 21–32)
CREAT SERPL-MCNC: 0.83 MG/DL (ref 0.6–1.3)
DIFFERENTIAL METHOD BLD: ABNORMAL
EOSINOPHIL # BLD: 0.2 K/UL (ref 0–0.4)
EOSINOPHIL NFR BLD: 3 % (ref 0–5)
ERYTHROCYTE [DISTWIDTH] IN BLOOD BY AUTOMATED COUNT: 16.5 % (ref 11.6–14.5)
GLUCOSE SERPL-MCNC: 182 MG/DL (ref 74–99)
HCT VFR BLD AUTO: 36.9 % (ref 35–45)
HGB BLD-MCNC: 11.5 G/DL (ref 12–16)
LYMPHOCYTES # BLD: 1.5 K/UL (ref 0.9–3.6)
LYMPHOCYTES NFR BLD: 26 % (ref 21–52)
MCH RBC QN AUTO: 26.2 PG (ref 24–34)
MCHC RBC AUTO-ENTMCNC: 31.2 G/DL (ref 31–37)
MCV RBC AUTO: 84.1 FL (ref 74–97)
MONOCYTES # BLD: 0.4 K/UL (ref 0.05–1.2)
MONOCYTES NFR BLD: 8 % (ref 3–10)
NEUTS SEG # BLD: 3.5 K/UL (ref 1.8–8)
NEUTS SEG NFR BLD: 63 % (ref 40–73)
PLATELET # BLD AUTO: 243 K/UL (ref 135–420)
PMV BLD AUTO: 9.5 FL (ref 9.2–11.8)
POTASSIUM SERPL-SCNC: 3.7 MMOL/L (ref 3.5–5.5)
RBC # BLD AUTO: 4.39 M/UL (ref 4.2–5.3)
SODIUM SERPL-SCNC: 140 MMOL/L (ref 136–145)
WBC # BLD AUTO: 5.6 K/UL (ref 4.6–13.2)

## 2019-06-14 PROCEDURE — 73590 X-RAY EXAM OF LOWER LEG: CPT

## 2019-06-14 PROCEDURE — 96374 THER/PROPH/DIAG INJ IV PUSH: CPT

## 2019-06-14 PROCEDURE — 99283 EMERGENCY DEPT VISIT LOW MDM: CPT

## 2019-06-14 PROCEDURE — 80048 BASIC METABOLIC PNL TOTAL CA: CPT

## 2019-06-14 PROCEDURE — 74011000250 HC RX REV CODE- 250: Performed by: EMERGENCY MEDICINE

## 2019-06-14 PROCEDURE — 74011250637 HC RX REV CODE- 250/637: Performed by: EMERGENCY MEDICINE

## 2019-06-14 PROCEDURE — 74011250636 HC RX REV CODE- 250/636: Performed by: EMERGENCY MEDICINE

## 2019-06-14 PROCEDURE — 85025 COMPLETE CBC W/AUTO DIFF WBC: CPT

## 2019-06-14 PROCEDURE — 87040 BLOOD CULTURE FOR BACTERIA: CPT

## 2019-06-14 RX ORDER — HYDROCODONE BITARTRATE AND ACETAMINOPHEN 5; 325 MG/1; MG/1
2 TABLET ORAL
Status: COMPLETED | OUTPATIENT
Start: 2019-06-14 | End: 2019-06-14

## 2019-06-14 RX ADMIN — HYDROCODONE BITARTRATE AND ACETAMINOPHEN 2 TABLET: 5; 325 TABLET ORAL at 23:49

## 2019-06-14 RX ADMIN — CEFTRIAXONE 1 G: 1 INJECTION, POWDER, FOR SOLUTION INTRAMUSCULAR; INTRAVENOUS at 23:50

## 2019-06-14 NOTE — ED TRIAGE NOTES
Triage: pt states that she has ulcerations to BLE. Home care comes M,W,F to dress wounds. Pt states that drainage increased yesterday and is now a \"turquoise color, like pseudomonas. \"

## 2019-06-15 VITALS
SYSTOLIC BLOOD PRESSURE: 132 MMHG | RESPIRATION RATE: 16 BRPM | BODY MASS INDEX: 32.96 KG/M2 | HEART RATE: 78 BPM | TEMPERATURE: 98.1 F | OXYGEN SATURATION: 100 % | HEIGHT: 67 IN | DIASTOLIC BLOOD PRESSURE: 64 MMHG | WEIGHT: 210 LBS

## 2019-06-15 PROCEDURE — 87077 CULTURE AEROBIC IDENTIFY: CPT

## 2019-06-15 PROCEDURE — 87186 SC STD MICRODIL/AGAR DIL: CPT

## 2019-06-15 PROCEDURE — 87070 CULTURE OTHR SPECIMN AEROBIC: CPT

## 2019-06-15 RX ORDER — DOXYCYCLINE 100 MG/1
100 CAPSULE ORAL 2 TIMES DAILY
Qty: 14 CAP | Refills: 0 | Status: SHIPPED | OUTPATIENT
Start: 2019-06-15 | End: 2019-06-22

## 2019-06-15 RX ORDER — HYDROCODONE BITARTRATE AND ACETAMINOPHEN 5; 325 MG/1; MG/1
1 TABLET ORAL
Qty: 12 TAB | Refills: 0 | Status: SHIPPED | OUTPATIENT
Start: 2019-06-15 | End: 2019-06-18

## 2019-06-15 NOTE — ED PROVIDER NOTES
EMERGENCY DEPARTMENT HISTORY AND PHYSICAL EXAM    Date: 6/14/2019  Patient Name: Xavier Mendez    History of Presenting Illness     Chief Complaint   Patient presents with    Wound Check         History Provided By: Patient    9:45 PM  Xavier Mendez is a 70 y.o. female with PMHX of diabetes, hypertension, chronic leg wounds who presents to the emergency department C/O increased drainage and pain from her wounds. She states that a wound care nurse comes Monday Wednesday Friday to address her wounds and today noticed some turquoise colored drainage from her right leg that she stated she was concerned with Pseudomonas and told patient to come to the emergency department. Patient denies any fever or chills, shortness of breath, chest pain, other complaints. She does note she had some pain around the wound particularly the one on the right which has been draining much more than normal saturating her dressings every couple of hours. PCP: Myriam Sullivan MD    Current Outpatient Medications   Medication Sig Dispense Refill    doxycycline (MONODOX) 100 mg capsule Take 1 Cap by mouth two (2) times a day for 7 days. 14 Cap 0    HYDROcodone-acetaminophen (NORCO) 5-325 mg per tablet Take 1 Tab by mouth every four (4) hours as needed for Pain for up to 3 days. Max Daily Amount: 6 Tabs. 12 Tab 0    amLODIPine (NORVASC) 5 mg tablet Take 1 Tab by mouth daily. 30 Tab 0    trospium (SANCTURA XL) 60 mg capsule Take 60 mg by mouth Daily (before breakfast).  simvastatin (ZOCOR) 20 mg tablet Take 20 mg by mouth nightly.  aspirin delayed-release 81 mg tablet Take 81 mg by mouth daily.  gabapentin (NEURONTIN) 100 mg capsule Take 100 mg by mouth three (3) times daily.  glipiZIDE SR (GLUCOTROL XL) 10 mg CR tablet Take 10 mg by mouth daily.  multivitamin (ONE A DAY) tablet Take 1 Tab by mouth daily.  omeprazole (PRILOSEC) 20 mg capsule Take 20 mg by mouth daily.       SITagliptin (JANUVIA) 100 mg tablet Take 100 mg by mouth daily.  Fish Oil-Vit E-Fat NKWY5- (SUPER OMEGA-3) 400-5 mg-unit cap Take 1 Tab by mouth daily.  polyethylene glycol (MIRALAX) 17 gram/dose powder Take 17 g by mouth daily. 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 No pertinent surgical history. Family History:  History reviewed. No pertinent family history. Social History:  Social History     Tobacco Use    Smoking status: Never Smoker    Smokeless tobacco: Never Used   Substance Use Topics    Alcohol use: Not Currently    Drug use: Not on file       Allergies: Allergies   Allergen Reactions    Codeine Rash and Itching    Percocet [Oxycodone-Acetaminophen] Nausea and Vomiting    Tramadol Rash and Itching         Review of Systems   Review of Systems   Constitutional: Negative for chills and fever. Respiratory: Negative for shortness of breath. Cardiovascular: Positive for leg swelling. Negative for chest pain. Skin: Positive for wound. All other systems reviewed and are negative.         Physical Exam     Vitals:    06/14/19 1906 06/15/19 0057   BP: 137/60 132/64   Pulse: 82 78   Resp: 16 16   Temp: 98.5 °F (36.9 °C) 98.1 °F (36.7 °C)   SpO2: 100% 100%   Weight: 95.3 kg (210 lb)    Height: 5' 7\" (1.702 m)      Physical Exam    Nursing notes and vital signs reviewed    Constitutional: Non toxic appearing, elderly, no acute distress  Head: Normocephalic, Atraumatic  Eyes: EOMI  Neck: Supple  Chest: Normal work of breathing and chest excursion bilaterally  Back: No evidence of trauma or deformity  Extremities: No evidence of trauma or deformity, moderate bilateral LE edema, wounds on anterior shins bilaterally right is larger than left with increased tenderness and an area on the lateral aspect that appears tunneling, the right dressing was fully saturated with serous fluid, left wound does not have as much drainage or tenderness or erythema, distal neurovascular intact  Skin: Warm and dry, normal cap refill  Neuro: Alert and appropriate  Psychiatric: Normal mood and affect      Diagnostic Study Results     Labs -     Recent Results (from the past 12 hour(s))   CBC WITH AUTOMATED DIFF    Collection Time: 06/14/19 11:00 PM   Result Value Ref Range    WBC 5.6 4.6 - 13.2 K/uL    RBC 4.39 4.20 - 5.30 M/uL    HGB 11.5 (L) 12.0 - 16.0 g/dL    HCT 36.9 35.0 - 45.0 %    MCV 84.1 74.0 - 97.0 FL    MCH 26.2 24.0 - 34.0 PG    MCHC 31.2 31.0 - 37.0 g/dL    RDW 16.5 (H) 11.6 - 14.5 %    PLATELET 842 454 - 978 K/uL    MPV 9.5 9.2 - 11.8 FL    NEUTROPHILS 63 40 - 73 %    LYMPHOCYTES 26 21 - 52 %    MONOCYTES 8 3 - 10 %    EOSINOPHILS 3 0 - 5 %    BASOPHILS 0 0 - 2 %    ABS. NEUTROPHILS 3.5 1.8 - 8.0 K/UL    ABS. LYMPHOCYTES 1.5 0.9 - 3.6 K/UL    ABS. MONOCYTES 0.4 0.05 - 1.2 K/UL    ABS. EOSINOPHILS 0.2 0.0 - 0.4 K/UL    ABS. BASOPHILS 0.0 0.0 - 0.1 K/UL    DF AUTOMATED     METABOLIC PANEL, BASIC    Collection Time: 06/14/19 11:00 PM   Result Value Ref Range    Sodium 140 136 - 145 mmol/L    Potassium 3.7 3.5 - 5.5 mmol/L    Chloride 106 100 - 108 mmol/L    CO2 29 21 - 32 mmol/L    Anion gap 5 3.0 - 18 mmol/L    Glucose 182 (H) 74 - 99 mg/dL    BUN 19 (H) 7.0 - 18 MG/DL    Creatinine 0.83 0.6 - 1.3 MG/DL    BUN/Creatinine ratio 23 (H) 12 - 20      GFR est AA >60 >60 ml/min/1.73m2    GFR est non-AA >60 >60 ml/min/1.73m2    Calcium 9.0 8.5 - 10.1 MG/DL       Radiologic Studies -   XR TIB/FIB RT   Final Result   IMPRESSION: No acute fractures or subluxation of right tibia and fibula. No   erosions seen to suggest osteomyelitis.  There is also along the anterior and   lateral aspect of the mid right leg. Cellulitis. XR TIB/FIB LT   Final Result   IMPRESSION: No acute fractures or subluxation of left tibia and fibula. No   erosions to suggest osteomyelitis. No hardware complication. Soft tissue swelling of the leg suggesting cellulitis with phleboliths abnormal   calcification. There is large ulcer along the lateral aspect of the leg. CT Results  (Last 48 hours)    None        CXR Results  (Last 48 hours)    None          Medications given in the ED-  Medications   HYDROcodone-acetaminophen (NORCO) 5-325 mg per tablet 2 Tab (2 Tabs Oral Given 6/14/19 3673)   cefTRIAXone (ROCEPHIN) 1 g in sterile water (preservative free) 10 mL IV syringe (1 g IntraVENous Given 6/14/19 6795)         Medical Decision Making   I am the first provider for this patient. I reviewed the vital signs, available nursing notes, past medical history, past surgical history, family history and social history. Vital Signs-Reviewed the patient's vital signs. Records Reviewed: Nursing Notes    Provider Notes (Medical Decision Making): Alvaro Tran is a 70 y.o. female presenting for increased drainage from her chronic lower extremity wounds. Patient is nontoxic, afebrile, with normal white count. Cultures were sent. Plan to start oral antibiotics and discharge with early wound care follow-up and strict return precautions. Patient understands and agrees with this plan. Procedures:  Procedures    ED Course:   12:18 AM  Updated patient on results and plan, all questions answered      Diagnosis and Disposition       DISCHARGE NOTE:    Gus Gill's  results have been reviewed with her. She has been counseled regarding her diagnosis, treatment, and plan. She verbally conveys understanding and agreement of the signs, symptoms, diagnosis, treatment and prognosis and additionally agrees to follow up as discussed. She also agrees with the care-plan and conveys that all of her questions have been answered. I have also provided discharge instructions for her that include: educational information regarding their diagnosis and treatment, and list of reasons why they would want to return to the ED prior to their follow-up appointment, should her condition change. She has been provided with education for proper emergency department utilization. CLINICAL IMPRESSION:    1. Cellulitis of right leg without foot        PLAN:  1. D/C Home  2. Discharge Medication List as of 6/15/2019 12:17 AM      START taking these medications    Details   doxycycline (MONODOX) 100 mg capsule Take 1 Cap by mouth two (2) times a day for 7 days. , Normal, Disp-14 Cap, R-0      HYDROcodone-acetaminophen (NORCO) 5-325 mg per tablet Take 1 Tab by mouth every four (4) hours as needed for Pain for up to 3 days. Max Daily Amount: 6 Tabs., Print, Disp-12 Tab, R-0         CONTINUE these medications which have NOT CHANGED    Details   amLODIPine (NORVASC) 5 mg tablet Take 1 Tab by mouth daily. , Normal, Disp-30 Tab, R-0      trospium (SANCTURA XL) 60 mg capsule Take 60 mg by mouth Daily (before breakfast). , Historical Med      simvastatin (ZOCOR) 20 mg tablet Take 20 mg by mouth nightly., Historical Med      aspirin delayed-release 81 mg tablet Take 81 mg by mouth daily. , Historical Med      gabapentin (NEURONTIN) 100 mg capsule Take 100 mg by mouth three (3) times daily. , Historical Med      glipiZIDE SR (GLUCOTROL XL) 10 mg CR tablet Take 10 mg by mouth daily. , Historical Med      multivitamin (ONE A DAY) tablet Take 1 Tab by mouth daily. , Historical Med      omeprazole (PRILOSEC) 20 mg capsule Take 20 mg by mouth daily. , Historical Med      SITagliptin (JANUVIA) 100 mg tablet Take 100 mg by mouth daily. , Historical Med      Fish Oil-Vit E-Fat KIWL1- (SUPER OMEGA-3) 400-5 mg-unit cap Take 1 Tab by mouth daily. , Historical Med      polyethylene glycol (MIRALAX) 17 gram/dose powder Take 17 g by mouth daily. , Historical Med           3. Follow-up Information     Follow up With Specialties Details Why Contact Info    Preeti Power MD [facility] Schedule an appointment as soon as possible for a visit  [address] [facility]      Ernesto Fontanez DPM Podiatry Schedule an appointment as soon as possible for a visit  [address]       [facility] Emergency Medicine  If symptoms worsen [address] [place] [phone]  [phone]        _______________________________      Please note that this dictation was completed with [employer], the computer voice recognition software. Quite often unanticipated grammatical, syntax, homophones, and other interpretive errors are inadvertently transcribed by the computer software. Please disregard these errors. Please excuse any errors that have escaped final proofreading.

## 2019-06-15 NOTE — ED NOTES
Pt discharged home stable and ambulatory with use of walker . Pain level at discharge 4/10. Pt discharged with son. Reviewed discharged instructions with pt who verbalized understanding.   Patient armband removed and shredded  Written rx x's 1  Nonstick dressings applied to BLE

## 2019-06-15 NOTE — DISCHARGE INSTRUCTIONS

## 2019-06-19 LAB
BACTERIA SPEC CULT: ABNORMAL
GRAM STN SPEC: ABNORMAL
SERVICE CMNT-IMP: ABNORMAL
SERVICE CMNT-IMP: ABNORMAL

## 2019-06-20 LAB
BACTERIA SPEC CULT: NORMAL
SERVICE CMNT-IMP: NORMAL

## 2019-06-21 LAB
BACTERIA SPEC CULT: NORMAL
SERVICE CMNT-IMP: NORMAL

## 2019-06-24 NOTE — CALL BACK NOTE
Pt doing elvin, has seen Dr Holt 2 times since ED visit and may have plans to initiate wound vac per pt, she has f/u appt upcoming. Pt finished Doxycycline yesterday.

## 2019-06-24 NOTE — CALL BACK NOTE
Called home number on file, no answer. Left voicemail requesting callback for results review. Review wound cx. Ensure f/u.

## 2021-10-27 NOTE — PROGRESS NOTES
Problem: Self Care Deficits Care Plan (Adult)  Goal: *Acute Goals and Plan of Care (Insert Text)  Initial Occupational Therapy Goals (8/8/2018) Within 7 day(s):    1. Patient will perform grooming in supported sitting with setup x 15 minutes for increased independence with ADLs. 2. Patient will perform UB dressing with setup for increased independence with ADLs. 3. Patient will perform LB dressing with moderate assist & A/E PRN for increased independence with ADLs. 4. Patient will perform all aspects of toileting with moderate assist for increased independence in ADLs  5. Patient will independently apply energy conservation techniques with 1 verbal cue(s) for increased independence with ADLs. 6. Patient will utilize good body mechanics during ADLs with 1 verbal cue(s). 7. Patient will perform functional transfer for toileting with moderate assist.     Occupational Therapy TREATMENT    Patient: Demetrio House (89 y.o. female)  Date: 8/10/2018  Diagnosis: Rhabdomyolysis  Lactic acidosis  Leg ulcer (HCC) Rhabdomyolysis       Precautions: Fall, Skin  Chart, occupational therapy assessment, plan of care, and goals were reviewed. PLOF: Pt reports to be independent with BADLs and transfers at home. ASSESSMENT:  Pt was co-treated with physical therapy due to poor endurance and pt requiring assist x2 for bed mobility. Pt participated with bed mobility and performed neuro-muscular re-education activities to improve sitting balance and endurance and UE exercises while seated EOB. Pt's HR increased to 167bpm by the end of participation with minimally exertive activity/exercise. Pt was assisted back to bed and repositioned with total assistance x2. EDUCATION Pt was educated on continuing participation with UE strengthening and AROM exercises while in bed to improve endurance and strength. Pt verbalized understanding for the same.    Progression toward goals:  [x]          Improving appropriately and progressing toward goals  []          Improving slowly and progressing toward goals  []          Not making progress toward goals and plan of care will be adjusted     PLAN:  Patient continues to benefit from skilled intervention to address the above impairments. Continue treatment per established plan of care. Discharge Recommendations:  Michael Grayson and To Be Determined  Further Equipment Recommendations for Discharge:  TBD     SUBJECTIVE:   Patient stated My stomach hurts.     OBJECTIVE DATA SUMMARY:     G CODES:  Self Care  Current  CM= 80-99%  [id number] Goal  CI= 1-19%. The severity rating is based on the Other participation with ADLs and transfers. Cognitive/Behavioral Status:  Neurologic State: Alert, Appropriate for age  Orientation Level: Oriented X4  Cognition: Appropriate decision making, Appropriate for age attention/concentration, Appropriate safety awareness, Follows commands  Safety/Judgement: Decreased insight into deficits  Functional Mobility and Transfers for ADLs:   Bed Mobility:  Rolling: Moderate assistance;Maximum assistance  Supine to Sit: Moderate assistance  Sit to Supine: Maximum assistance  Scooting: Total assistance;Assist x2   Transfers:  Balance:  Sitting: Impaired  Sitting - Static: Fair (occasional)  Sitting - Dynamic: Poor (constant support)  Standing:  (not tested)  ADL Intervention:    Neuro Re-Education:  Pt participated with weight shifting activity while seated EOB  To improve dynamic sitting balance. Pt required frequent verbal and visual cues to continue with activity. Minimum rest breaks required. Therapeutic Exercises:   UE AROM exercises to improve strength and endurance. X10, minimum rest breaks required.      Pain:  Pre Treatment:  Post Treatment:  Pain Scale 1: Numeric (0 - 10)  Pain Intensity 1: 10  Pain Location 1: Abdomen     Pain Description 1: Aching  Pain Intervention(s) 1: Nurse notified    Activity Tolerance:    Fair     Please refer to the flowsheet for vital signs taken during this treatment.   After treatment:   []  Patient left in no apparent distress sitting up in chair  [x]  Patient left in no apparent distress in bed  [x]  Call bell left within reach  [x]  Nursing notified  []  Caregiver present  []  Bed alarm activated    Annette Kendrick OTR/AJAY  Time Calculation: 33 mins PAIN SCALE 5 OF 10.

## 2022-03-18 PROBLEM — L97.822 SKIN ULCER OF POPLITEAL REGION, LEFT, WITH FAT LAYER EXPOSED (HCC): Status: ACTIVE | Noted: 2018-10-25

## 2022-03-19 PROBLEM — E87.20 LACTIC ACIDOSIS: Status: ACTIVE | Noted: 2018-08-04

## 2022-03-19 PROBLEM — M62.82 RHABDOMYOLYSIS: Status: ACTIVE | Noted: 2018-08-04

## 2022-03-19 PROBLEM — L97.812 ULCER OF RIGHT PRETIBIAL REGION, WITH FAT LAYER EXPOSED (HCC): Status: ACTIVE | Noted: 2018-10-25

## 2022-03-19 PROBLEM — L08.9 SKIN INFECTION: Status: ACTIVE | Noted: 2018-11-08

## 2022-03-19 PROBLEM — L97.909 LEG ULCER (HCC): Status: ACTIVE | Noted: 2018-08-04

## 2023-06-08 NOTE — PROGRESS NOTES
Hospitalist Progress Note    Patient: Jenny Leija MRN: [id number]  CSN: [id number]    YOB: 1940  Age: 70 y.o. Sex: female    DOA: 8/3/2018 LOS:  LOS: 4 days                Assessment/Plan     Patient Active Problem List   Diagnosis Code    Rhabdomyolysis M62.82    Lactic acidosis E87.2    Leg ulcer ([facility] [address] L97.909    Venous insufficiency I87.2    HTN (hypertension) I10    Hypercholesteremia E78.00    DM2 (diabetes mellitus, type 2) ([facility] [address] E11.9    Chronic ulcer of left lower extremity with fat layer exposed ([facility] [address] P65.337    Cellulitis L03.90            69 yo female admitted for cellulitis, rhabdo    Rhabdomyolysis - resolved     Cellulitis with leg ulcers - improving  Lactate has normalized  Continue broad spectrum abx with Rocephin and Vanc, add levaquin. Continue wound care . Edema - restart on lasix, keep legs elevated     DM - start on lantus, continue sliding scale   holding Januvia and Glipizide       Chronic pain - Continue Norco prn    PT/OT    Discussed at length with daughter at bedside    40 total min's spent on patient care including >50% on counseling/coordinating care. Discussed the above assessments. also discussed labs, medications and hospital course. Disposition : 2-3 days    Review of systems  General: No fevers or chills. Cardiovascular: No chest pain or pressure. No palpitations. Pulmonary: No shortness of breath. Gastrointestinal: No nausea, vomiting. Physical Exam:  General: Awake, cooperative, no acute distress    HEENT: NC, Atraumatic. PERRLA, anicteric sclerae. Lungs: CTA Bilaterally. No Wheezing/Rhonchi/Rales. Heart:  Regular  rhythm,  No murmur, No Rubs, No Gallops  Abdomen: Soft, Non distended, Non tender.  +Bowel sounds,   Extremities: LE swelling bilaterally, bilateral Unna boots  Psych:   Not anxious or agitated. Neurologic:  No acute neurological deficit.            Vital signs/Intake and Output:  Visit Vitals    BP (!) 161/91 (BP 1 Urgent IM appointment scheduled to establish with primary care on 6/20 at 2pm   Location: Left arm, BP Patient Position: At rest)    Pulse 97    Temp 98.5 °F (36.9 °C)    Resp 20    Ht 5' 7\" (1.702 m)    Wt 108.4 kg (239 lb)    SpO2 95%    BMI 37.43 kg/m2     Current Shift:  08/08 0701 - 08/08 1900  In: 480 [P.O.:480]  Out: -   Last three shifts:  08/06 1901 - 08/08 0700  In: 780 [P.O.:120; I.V.:660]  Out: 2460 [Urine:2300; Drains:160]            Labs: Results:       Chemistry Recent Labs      08/08/18   0024  08/07/18   0412  08/06/18   0534   GLU  152*  141*  212*   NA  140  139  138   K  3.3*  3.7  3.9   CL  105  105  106   CO2  28  28  26   BUN  6*  7  11   CREA  0.65  0.63  0.83   CA  9.0  9.2  9.1   AGAP  7  6  6   BUCR  9*  11*  13   AP  84   --    --    TP  6.4   --    --    ALB  1.6*   --    --    GLOB  4.8*   --    --    AGRAT  0.3*   --    --       CBC w/Diff Recent Labs      08/08/18   0024   WBC  13.2   RBC  3.38*   HGB  8.9*   HCT  27.8*   PLT  221   GRANS  77*   LYMPH  14*   EOS  0      Cardiac Enzymes Recent Labs      08/08/18   0024  08/06/18   0534   CPK  147  455*      Coagulation No results for input(s): PTP, INR, APTT in the last 72 hours. No lab exists for component: INREXT, INREXT    Lipid Panel No results found for: CHOL, CHOLPOCT, CHOLX, CHLST, CHOLV, 449061, HDL, LDL, LDLC, DLDLP, 492256, VLDLC, VLDL, TGLX, TRIGL, TRIGP, TGLPOCT, CHHD, CHHDX   BNP No results for input(s): BNPP in the last 72 hours.    Liver Enzymes Recent Labs      08/08/18   0024   TP  6.4   ALB  1.6*   AP  84   SGOT  29      Thyroid Studies No results found for: T4, T3U, TSH, TSHEXT, TSHEXT     Procedures/imaging: see electronic medical records for all procedures/Xrays and details which were not copied into this note but were reviewed prior to creation of Plan

## 2023-12-06 ENCOUNTER — APPOINTMENT (OUTPATIENT)
Facility: HOSPITAL | Age: 83
End: 2023-12-06
Payer: MEDICARE

## 2023-12-06 ENCOUNTER — HOSPITAL ENCOUNTER (EMERGENCY)
Facility: HOSPITAL | Age: 83
Discharge: HOME | End: 2023-12-06
Payer: MEDICARE

## 2023-12-06 VITALS
HEIGHT: 67 IN | WEIGHT: 210 LBS | SYSTOLIC BLOOD PRESSURE: 134 MMHG | BODY MASS INDEX: 32.96 KG/M2 | TEMPERATURE: 97.9 F | OXYGEN SATURATION: 96 % | HEART RATE: 71 BPM | RESPIRATION RATE: 20 BRPM | DIASTOLIC BLOOD PRESSURE: 73 MMHG

## 2023-12-06 DIAGNOSIS — R07.81 RIB PAIN: Primary | ICD-10-CM

## 2023-12-06 DIAGNOSIS — R10.84 GENERALIZED ABDOMINAL PAIN: ICD-10-CM

## 2023-12-06 LAB
ALBUMIN SERPL-MCNC: 3.5 G/DL (ref 3.4–5)
ALBUMIN/GLOB SERPL: 0.9 (ref 0.8–1.7)
ALP SERPL-CCNC: 86 U/L (ref 45–117)
ALT SERPL-CCNC: 14 U/L (ref 13–56)
ANION GAP SERPL CALC-SCNC: 5 MMOL/L (ref 3–18)
AST SERPL-CCNC: 15 U/L (ref 10–38)
BASOPHILS # BLD: 0 K/UL (ref 0–0.1)
BASOPHILS NFR BLD: 1 % (ref 0–2)
BILIRUB SERPL-MCNC: 0.3 MG/DL (ref 0.2–1)
BUN SERPL-MCNC: 22 MG/DL (ref 7–18)
BUN/CREAT SERPL: 23 (ref 12–20)
CALCIUM SERPL-MCNC: 9.9 MG/DL (ref 8.5–10.1)
CHLORIDE SERPL-SCNC: 108 MMOL/L (ref 100–111)
CO2 SERPL-SCNC: 27 MMOL/L (ref 21–32)
CREAT SERPL-MCNC: 0.95 MG/DL (ref 0.6–1.3)
DIFFERENTIAL METHOD BLD: ABNORMAL
EKG ATRIAL RATE: 70 BPM
EKG DIAGNOSIS: NORMAL
EKG P AXIS: 42 DEGREES
EKG P-R INTERVAL: 176 MS
EKG Q-T INTERVAL: 408 MS
EKG QRS DURATION: 76 MS
EKG QTC CALCULATION (BAZETT): 440 MS
EKG R AXIS: -32 DEGREES
EKG T AXIS: 20 DEGREES
EKG VENTRICULAR RATE: 70 BPM
EOSINOPHIL # BLD: 0.3 K/UL (ref 0–0.4)
EOSINOPHIL NFR BLD: 6 % (ref 0–5)
ERYTHROCYTE [DISTWIDTH] IN BLOOD BY AUTOMATED COUNT: 14.9 % (ref 11.6–14.5)
GLOBULIN SER CALC-MCNC: 3.9 G/DL (ref 2–4)
GLUCOSE SERPL-MCNC: 123 MG/DL (ref 74–99)
HCT VFR BLD AUTO: 38.8 % (ref 35–45)
HGB BLD-MCNC: 12.6 G/DL (ref 12–16)
IMM GRANULOCYTES # BLD AUTO: 0 K/UL (ref 0–0.04)
IMM GRANULOCYTES NFR BLD AUTO: 0 % (ref 0–0.5)
LIPASE SERPL-CCNC: 47 U/L (ref 13–75)
LYMPHOCYTES # BLD: 1.8 K/UL (ref 0.9–3.6)
LYMPHOCYTES NFR BLD: 30 % (ref 21–52)
MAGNESIUM SERPL-MCNC: 2.3 MG/DL (ref 1.6–2.6)
MCH RBC QN AUTO: 29 PG (ref 24–34)
MCHC RBC AUTO-ENTMCNC: 32.5 G/DL (ref 31–37)
MCV RBC AUTO: 89.4 FL (ref 78–100)
MONOCYTES # BLD: 0.4 K/UL (ref 0.05–1.2)
MONOCYTES NFR BLD: 7 % (ref 3–10)
NEUTS SEG # BLD: 3.5 K/UL (ref 1.8–8)
NEUTS SEG NFR BLD: 57 % (ref 40–73)
NRBC # BLD: 0 K/UL (ref 0–0.01)
NRBC BLD-RTO: 0 PER 100 WBC
PLATELET # BLD AUTO: 167 K/UL (ref 135–420)
PMV BLD AUTO: 11.3 FL (ref 9.2–11.8)
POTASSIUM SERPL-SCNC: 4.6 MMOL/L (ref 3.5–5.5)
PROT SERPL-MCNC: 7.4 G/DL (ref 6.4–8.2)
RBC # BLD AUTO: 4.34 M/UL (ref 4.2–5.3)
SODIUM SERPL-SCNC: 140 MMOL/L (ref 136–145)
TROPONIN I SERPL HS-MCNC: 6 NG/L (ref 0–54)
WBC # BLD AUTO: 6.1 K/UL (ref 4.6–13.2)

## 2023-12-06 PROCEDURE — 71250 CT THORAX DX C-: CPT

## 2023-12-06 PROCEDURE — 80053 COMPREHEN METABOLIC PANEL: CPT

## 2023-12-06 PROCEDURE — 93010 ELECTROCARDIOGRAM REPORT: CPT | Performed by: INTERNAL MEDICINE

## 2023-12-06 PROCEDURE — 84484 ASSAY OF TROPONIN QUANT: CPT

## 2023-12-06 PROCEDURE — 83735 ASSAY OF MAGNESIUM: CPT

## 2023-12-06 PROCEDURE — 85025 COMPLETE CBC W/AUTO DIFF WBC: CPT

## 2023-12-06 PROCEDURE — 99284 EMERGENCY DEPT VISIT MOD MDM: CPT

## 2023-12-06 PROCEDURE — 83690 ASSAY OF LIPASE: CPT

## 2023-12-06 PROCEDURE — 93005 ELECTROCARDIOGRAM TRACING: CPT | Performed by: NURSE PRACTITIONER

## 2023-12-06 RX ORDER — LIDOCAINE 50 MG/G
1 PATCH TOPICAL DAILY
Qty: 10 PATCH | Refills: 0 | Status: SHIPPED | OUTPATIENT
Start: 2023-12-06 | End: 2023-12-16

## 2023-12-06 ASSESSMENT — PAIN SCALES - GENERAL: PAINLEVEL_OUTOF10: 7

## 2023-12-06 ASSESSMENT — PAIN DESCRIPTION - LOCATION: LOCATION: RIB CAGE

## 2023-12-06 ASSESSMENT — PAIN DESCRIPTION - ORIENTATION: ORIENTATION: RIGHT

## 2023-12-06 ASSESSMENT — PAIN - FUNCTIONAL ASSESSMENT: PAIN_FUNCTIONAL_ASSESSMENT: 0-10

## 2023-12-06 NOTE — ED PROVIDER NOTES
Notable for the following components:    RDW 14.9 (*)     Eosinophils % 6 (*)     All other components within normal limits   MAGNESIUM   LIPASE   TROPONIN         EKG: When ordered, EKG's are interpreted by the Emergency Department Provider in the absence of a cardiologist.  Please see their note for interpretation of EKG. Normal sinus rhythm. No STEMI. Ventricular rate 70 bpm.  Read by me and Dr Corwin Torres. RADIOLOGY:  Non-plain film images such as CT, Ultrasound and MRI are read by the radiologist. Plain radiographic images are visualized and preliminarily interpreted by the ED Provider with the below findings:     Read by me, pending review by radiologist.     Interpretation per the Radiologist below, if available at the time of this note:  CT CHEST ABDOMEN PELVIS WO CONTRAST Additional Contrast? None   Final Result      1. No acute abnormalities within the chest, abdomen, or pelvis. 2. Cholelithiasis. No biliary ductal dilatation. 3. No evidence of bowel obstruction or perforation. 4. No evidence of rib fracture. PROCEDURES   Unless otherwise noted below, none  Procedures         CRITICAL CARE TIME   None      FINAL IMPRESSION   No diagnosis found. DISPOSITION/PLAN   DISPOSITION             PATIENT REFERRED TO:  No follow-up provider specified. DISCHARGE MEDICATIONS:     Medication List      You have not been prescribed any medications. I am the Primary Clinician of Record. (Please note that parts of this dictation were completed with voice recognition software. Quite often unanticipated grammatical, syntax, homophones, and other interpretive errors are inadvertently transcribed by the computer software. Please disregards these errors.  Please excuse any errors that have escaped final proofreading.)       TANNER Perez  12/06/23 0581

## 2023-12-06 NOTE — ED TRIAGE NOTES
Pt ambulatory with walker to triage c/o fall on 11/17. Was seen a urgent care. Pt still having pain on right side of ribs. Pt also endorses feeling bloated. Wants to have an xray of her ribs and abdomen done. Pt was prescribed ibuprofen but couldn't take it cause it made her nauseous.

## 2023-12-06 NOTE — DISCHARGE INSTRUCTIONS
Thank you for allowing me to take care of you today. Please follow-up with your primary care provider as discussed. Please do not hesitate to come back to the emergency department for further evaluation if new or worsening symptoms occur.